# Patient Record
Sex: FEMALE | Race: WHITE | URBAN - METROPOLITAN AREA
[De-identification: names, ages, dates, MRNs, and addresses within clinical notes are randomized per-mention and may not be internally consistent; named-entity substitution may affect disease eponyms.]

---

## 2017-01-01 ENCOUNTER — MEDICAL CORRESPONDENCE (OUTPATIENT)
Dept: HEALTH INFORMATION MANAGEMENT | Facility: CLINIC | Age: 82
End: 2017-01-01

## 2017-01-01 ENCOUNTER — TELEPHONE (OUTPATIENT)
Dept: INTERNAL MEDICINE | Facility: CLINIC | Age: 82
End: 2017-01-01

## 2017-01-01 ENCOUNTER — APPOINTMENT (OUTPATIENT)
Dept: CARDIOLOGY | Facility: CLINIC | Age: 82
DRG: 603 | End: 2017-01-01
Attending: INTERNAL MEDICINE
Payer: MEDICARE

## 2017-01-01 ENCOUNTER — APPOINTMENT (OUTPATIENT)
Dept: ULTRASOUND IMAGING | Facility: CLINIC | Age: 82
DRG: 682 | End: 2017-01-01
Attending: INTERNAL MEDICINE
Payer: MEDICARE

## 2017-01-01 ENCOUNTER — CARE COORDINATION (OUTPATIENT)
Dept: CARE COORDINATION | Facility: CLINIC | Age: 82
End: 2017-01-01

## 2017-01-01 ENCOUNTER — OFFICE VISIT (OUTPATIENT)
Dept: INTERNAL MEDICINE | Facility: CLINIC | Age: 82
End: 2017-01-01
Payer: COMMERCIAL

## 2017-01-01 ENCOUNTER — NURSING HOME VISIT (OUTPATIENT)
Dept: GERIATRICS | Facility: CLINIC | Age: 82
End: 2017-01-01
Payer: COMMERCIAL

## 2017-01-01 ENCOUNTER — APPOINTMENT (OUTPATIENT)
Dept: PHYSICAL THERAPY | Facility: CLINIC | Age: 82
DRG: 603 | End: 2017-01-01
Payer: MEDICARE

## 2017-01-01 ENCOUNTER — APPOINTMENT (OUTPATIENT)
Dept: ULTRASOUND IMAGING | Facility: CLINIC | Age: 82
DRG: 603 | End: 2017-01-01
Attending: EMERGENCY MEDICINE
Payer: MEDICARE

## 2017-01-01 ENCOUNTER — HOSPITAL ENCOUNTER (EMERGENCY)
Facility: CLINIC | Age: 82
Discharge: HOME | End: 2017-04-30
Attending: EMERGENCY MEDICINE | Admitting: EMERGENCY MEDICINE
Payer: MEDICARE

## 2017-01-01 ENCOUNTER — APPOINTMENT (OUTPATIENT)
Dept: PHYSICAL THERAPY | Facility: CLINIC | Age: 82
DRG: 682 | End: 2017-01-01
Attending: INTERNAL MEDICINE
Payer: MEDICARE

## 2017-01-01 ENCOUNTER — TELEPHONE (OUTPATIENT)
Dept: NURSING | Facility: CLINIC | Age: 82
End: 2017-01-01

## 2017-01-01 ENCOUNTER — APPOINTMENT (OUTPATIENT)
Dept: PHYSICAL THERAPY | Facility: CLINIC | Age: 82
DRG: 603 | End: 2017-01-01
Attending: INTERNAL MEDICINE
Payer: MEDICARE

## 2017-01-01 ENCOUNTER — HOSPITAL ENCOUNTER (INPATIENT)
Facility: CLINIC | Age: 82
LOS: 4 days | Discharge: NURSING, REHAB OR LONG-TERM CARE | DRG: 682 | End: 2017-10-14
Attending: EMERGENCY MEDICINE | Admitting: INTERNAL MEDICINE
Payer: MEDICARE

## 2017-01-01 ENCOUNTER — DOCUMENTATION ONLY (OUTPATIENT)
Dept: OTHER | Facility: CLINIC | Age: 82
End: 2017-01-01

## 2017-01-01 ENCOUNTER — HOSPITAL ENCOUNTER (INPATIENT)
Facility: CLINIC | Age: 82
LOS: 3 days | Discharge: NURSING, REHAB OR LONG-TERM CARE | DRG: 603 | End: 2017-01-12
Attending: EMERGENCY MEDICINE | Admitting: INTERNAL MEDICINE
Payer: MEDICARE

## 2017-01-01 ENCOUNTER — RADIANT APPOINTMENT (OUTPATIENT)
Dept: GENERAL RADIOLOGY | Facility: CLINIC | Age: 82
End: 2017-01-01
Attending: INTERNAL MEDICINE
Payer: COMMERCIAL

## 2017-01-01 ENCOUNTER — APPOINTMENT (OUTPATIENT)
Dept: GENERAL RADIOLOGY | Facility: CLINIC | Age: 82
DRG: 603 | End: 2017-01-01
Attending: EMERGENCY MEDICINE
Payer: MEDICARE

## 2017-01-01 ENCOUNTER — APPOINTMENT (OUTPATIENT)
Dept: CT IMAGING | Facility: CLINIC | Age: 82
DRG: 682 | End: 2017-01-01
Attending: EMERGENCY MEDICINE
Payer: MEDICARE

## 2017-01-01 VITALS
TEMPERATURE: 97.5 F | DIASTOLIC BLOOD PRESSURE: 80 MMHG | OXYGEN SATURATION: 98 % | BODY MASS INDEX: 25.79 KG/M2 | SYSTOLIC BLOOD PRESSURE: 158 MMHG | WEIGHT: 141 LBS | HEART RATE: 49 BPM

## 2017-01-01 VITALS
SYSTOLIC BLOOD PRESSURE: 156 MMHG | DIASTOLIC BLOOD PRESSURE: 78 MMHG | HEART RATE: 55 BPM | WEIGHT: 159.9 LBS | BODY MASS INDEX: 26.61 KG/M2 | TEMPERATURE: 98 F | OXYGEN SATURATION: 98 %

## 2017-01-01 VITALS
DIASTOLIC BLOOD PRESSURE: 80 MMHG | WEIGHT: 120.7 LBS | HEART RATE: 60 BPM | BODY MASS INDEX: 23.7 KG/M2 | OXYGEN SATURATION: 98 % | TEMPERATURE: 98.7 F | HEIGHT: 60 IN | SYSTOLIC BLOOD PRESSURE: 158 MMHG

## 2017-01-01 VITALS
HEIGHT: 62 IN | TEMPERATURE: 98.6 F | BODY MASS INDEX: 28.89 KG/M2 | DIASTOLIC BLOOD PRESSURE: 78 MMHG | WEIGHT: 157 LBS | OXYGEN SATURATION: 96 % | SYSTOLIC BLOOD PRESSURE: 160 MMHG | HEART RATE: 59 BPM

## 2017-01-01 VITALS
HEIGHT: 59 IN | DIASTOLIC BLOOD PRESSURE: 105 MMHG | SYSTOLIC BLOOD PRESSURE: 227 MMHG | WEIGHT: 120 LBS | BODY MASS INDEX: 24.19 KG/M2 | TEMPERATURE: 98.1 F | HEART RATE: 85 BPM | OXYGEN SATURATION: 95 % | RESPIRATION RATE: 16 BRPM

## 2017-01-01 VITALS
HEIGHT: 59 IN | BODY MASS INDEX: 23.56 KG/M2 | DIASTOLIC BLOOD PRESSURE: 76 MMHG | HEART RATE: 60 BPM | WEIGHT: 116.84 LBS | OXYGEN SATURATION: 96 % | RESPIRATION RATE: 16 BRPM | TEMPERATURE: 97.8 F | SYSTOLIC BLOOD PRESSURE: 164 MMHG

## 2017-01-01 VITALS
HEART RATE: 67 BPM | SYSTOLIC BLOOD PRESSURE: 186 MMHG | OXYGEN SATURATION: 97 % | DIASTOLIC BLOOD PRESSURE: 82 MMHG | RESPIRATION RATE: 20 BRPM | TEMPERATURE: 98.7 F

## 2017-01-01 VITALS
TEMPERATURE: 98.2 F | DIASTOLIC BLOOD PRESSURE: 67 MMHG | SYSTOLIC BLOOD PRESSURE: 156 MMHG | BODY MASS INDEX: 24.9 KG/M2 | RESPIRATION RATE: 16 BRPM | HEART RATE: 68 BPM | HEIGHT: 65 IN | OXYGEN SATURATION: 98 % | WEIGHT: 149.47 LBS

## 2017-01-01 VITALS
WEIGHT: 149 LBS | DIASTOLIC BLOOD PRESSURE: 80 MMHG | SYSTOLIC BLOOD PRESSURE: 178 MMHG | BODY MASS INDEX: 24.79 KG/M2 | HEART RATE: 55 BPM | TEMPERATURE: 97.8 F | OXYGEN SATURATION: 98 %

## 2017-01-01 DIAGNOSIS — F41.9 ANXIETY: ICD-10-CM

## 2017-01-01 DIAGNOSIS — I10 ESSENTIAL HYPERTENSION, BENIGN: Primary | ICD-10-CM

## 2017-01-01 DIAGNOSIS — I27.20 PULMONARY HYPERTENSION (H): ICD-10-CM

## 2017-01-01 DIAGNOSIS — I27.20 PULMONARY HYPERTENSION (H): Primary | ICD-10-CM

## 2017-01-01 DIAGNOSIS — I10 ESSENTIAL HYPERTENSION, BENIGN: ICD-10-CM

## 2017-01-01 DIAGNOSIS — R21 RASH: ICD-10-CM

## 2017-01-01 DIAGNOSIS — I87.2 STASIS DERMATITIS OF BOTH LEGS: ICD-10-CM

## 2017-01-01 DIAGNOSIS — Z71.89 ACP (ADVANCE CARE PLANNING): Chronic | ICD-10-CM

## 2017-01-01 DIAGNOSIS — R60.0 BILATERAL LEG EDEMA: ICD-10-CM

## 2017-01-01 DIAGNOSIS — R06.02 SHORTNESS OF BREATH: ICD-10-CM

## 2017-01-01 DIAGNOSIS — N18.6 END STAGE RENAL DISEASE (H): Primary | ICD-10-CM

## 2017-01-01 DIAGNOSIS — N18.4 CKD (CHRONIC KIDNEY DISEASE) STAGE 4, GFR 15-29 ML/MIN (H): ICD-10-CM

## 2017-01-01 DIAGNOSIS — L03.115 CELLULITIS OF RIGHT LOWER EXTREMITY: ICD-10-CM

## 2017-01-01 DIAGNOSIS — I10 HYPERTENSION, UNSPECIFIED TYPE: ICD-10-CM

## 2017-01-01 DIAGNOSIS — I50.9 CONGESTIVE HEART FAILURE, UNSPECIFIED CONGESTIVE HEART FAILURE CHRONICITY, UNSPECIFIED CONGESTIVE HEART FAILURE TYPE: ICD-10-CM

## 2017-01-01 DIAGNOSIS — N18.9 CRF (CHRONIC RENAL FAILURE), UNSPECIFIED STAGE: ICD-10-CM

## 2017-01-01 DIAGNOSIS — R26.9 GAIT DISTURBANCE: ICD-10-CM

## 2017-01-01 DIAGNOSIS — N18.9 ACUTE RENAL FAILURE SUPERIMPOSED ON CHRONIC KIDNEY DISEASE, UNSPECIFIED CKD STAGE, UNSPECIFIED ACUTE RENAL FAILURE TYPE: ICD-10-CM

## 2017-01-01 DIAGNOSIS — I89.0 LYMPHEDEMA OF BOTH LOWER EXTREMITIES: Primary | ICD-10-CM

## 2017-01-01 DIAGNOSIS — R07.9 CHEST PAIN, UNSPECIFIED TYPE: ICD-10-CM

## 2017-01-01 DIAGNOSIS — R53.81 PHYSICAL DECONDITIONING: ICD-10-CM

## 2017-01-01 DIAGNOSIS — J90 PLEURAL EFFUSION: ICD-10-CM

## 2017-01-01 DIAGNOSIS — L03.115 CELLULITIS OF RIGHT LEG: Primary | ICD-10-CM

## 2017-01-01 DIAGNOSIS — Z53.9 DIAGNOSIS NOT YET DEFINED: Primary | ICD-10-CM

## 2017-01-01 DIAGNOSIS — N17.9 ACUTE RENAL FAILURE SUPERIMPOSED ON CHRONIC KIDNEY DISEASE, UNSPECIFIED CKD STAGE, UNSPECIFIED ACUTE RENAL FAILURE TYPE: ICD-10-CM

## 2017-01-01 DIAGNOSIS — J02.0 STREPTOCOCCAL PHARYNGITIS: ICD-10-CM

## 2017-01-01 DIAGNOSIS — L03.115 CELLULITIS OF RIGHT LEG: ICD-10-CM

## 2017-01-01 DIAGNOSIS — M54.6 THORACIC BACK PAIN, UNSPECIFIED BACK PAIN LATERALITY, UNSPECIFIED CHRONICITY: ICD-10-CM

## 2017-01-01 DIAGNOSIS — Z09 HOSPITAL DISCHARGE FOLLOW-UP: Primary | ICD-10-CM

## 2017-01-01 LAB
ALBUMIN UR-MCNC: >600 MG/DL
ALBUMIN UR-MCNC: >600 MG/DL
ANION GAP SERPL CALCULATED.3IONS-SCNC: 10 MMOL/L (ref 3–14)
ANION GAP SERPL CALCULATED.3IONS-SCNC: 12 MMOL/L (ref 3–14)
ANION GAP SERPL CALCULATED.3IONS-SCNC: 12 MMOL/L (ref 3–14)
ANION GAP SERPL CALCULATED.3IONS-SCNC: 7 MMOL/L (ref 3–14)
ANION GAP SERPL CALCULATED.3IONS-SCNC: 9 MMOL/L (ref 3–14)
APPEARANCE UR: CLEAR
APPEARANCE UR: CLEAR
BACTERIA #/AREA URNS HPF: ABNORMAL /HPF
BASOPHILS # BLD AUTO: 0 10E9/L (ref 0–0.2)
BASOPHILS NFR BLD AUTO: 0.2 %
BASOPHILS NFR BLD AUTO: 0.3 %
BASOPHILS NFR BLD AUTO: 0.3 %
BILIRUB UR QL STRIP: NEGATIVE
BILIRUB UR QL STRIP: NEGATIVE
BUN SERPL-MCNC: 34 MG/DL (ref 7–30)
BUN SERPL-MCNC: 36 MG/DL (ref 7–30)
BUN SERPL-MCNC: 36 MG/DL (ref 7–30)
BUN SERPL-MCNC: 37 MG/DL (ref 7–30)
BUN SERPL-MCNC: 38 MG/DL (ref 7–30)
BUN SERPL-MCNC: 39 MG/DL (ref 7–30)
BUN SERPL-MCNC: 41 MG/DL (ref 7–30)
BUN SERPL-MCNC: 50 MG/DL (ref 7–30)
BUN SERPL-MCNC: 53 MG/DL (ref 7–30)
BUN SERPL-MCNC: 54 MG/DL (ref 7–30)
BUN SERPL-MCNC: 54 MG/DL (ref 7–30)
BUN SERPL-MCNC: 57 MG/DL (ref 7–30)
CALCIUM SERPL-MCNC: 7.7 MG/DL (ref 8.5–10.1)
CALCIUM SERPL-MCNC: 7.8 MG/DL (ref 8.5–10.1)
CALCIUM SERPL-MCNC: 7.9 MG/DL (ref 8.5–10.1)
CALCIUM SERPL-MCNC: 8 MG/DL (ref 8.5–10.1)
CALCIUM SERPL-MCNC: 8 MG/DL (ref 8.5–10.1)
CALCIUM SERPL-MCNC: 8.1 MG/DL (ref 8.5–10.1)
CALCIUM SERPL-MCNC: 8.4 MG/DL (ref 8.5–10.1)
CALCIUM SERPL-MCNC: 8.5 MG/DL (ref 8.5–10.1)
CALCIUM SERPL-MCNC: 8.5 MG/DL (ref 8.5–10.1)
CALCIUM SERPL-MCNC: 8.6 MG/DL (ref 8.5–10.1)
CHLORIDE SERPL-SCNC: 103 MMOL/L (ref 94–109)
CHLORIDE SERPL-SCNC: 103 MMOL/L (ref 94–109)
CHLORIDE SERPL-SCNC: 104 MMOL/L (ref 94–109)
CHLORIDE SERPL-SCNC: 105 MMOL/L (ref 94–109)
CHLORIDE SERPL-SCNC: 106 MMOL/L (ref 94–109)
CHLORIDE SERPL-SCNC: 107 MMOL/L (ref 94–109)
CHLORIDE SERPL-SCNC: 109 MMOL/L (ref 94–109)
CHLORIDE SERPL-SCNC: 111 MMOL/L (ref 94–109)
CO2 SERPL-SCNC: 20 MMOL/L (ref 20–32)
CO2 SERPL-SCNC: 21 MMOL/L (ref 20–32)
CO2 SERPL-SCNC: 21 MMOL/L (ref 20–32)
CO2 SERPL-SCNC: 22 MMOL/L (ref 20–32)
CO2 SERPL-SCNC: 22 MMOL/L (ref 20–32)
CO2 SERPL-SCNC: 23 MMOL/L (ref 20–32)
CO2 SERPL-SCNC: 24 MMOL/L (ref 20–32)
CO2 SERPL-SCNC: 26 MMOL/L (ref 20–32)
CO2 SERPL-SCNC: 28 MMOL/L (ref 20–32)
COLOR UR AUTO: YELLOW
COLOR UR AUTO: YELLOW
CREAT SERPL-MCNC: 2.73 MG/DL (ref 0.52–1.04)
CREAT SERPL-MCNC: 2.76 MG/DL (ref 0.52–1.04)
CREAT SERPL-MCNC: 2.76 MG/DL (ref 0.52–1.04)
CREAT SERPL-MCNC: 2.84 MG/DL (ref 0.52–1.04)
CREAT SERPL-MCNC: 2.86 MG/DL (ref 0.52–1.04)
CREAT SERPL-MCNC: 2.9 MG/DL (ref 0.52–1.04)
CREAT SERPL-MCNC: 2.91 MG/DL (ref 0.52–1.04)
CREAT SERPL-MCNC: 3.03 MG/DL (ref 0.52–1.04)
CREAT SERPL-MCNC: 3.28 MG/DL (ref 0.52–1.04)
CREAT SERPL-MCNC: 4.84 MG/DL (ref 0.52–1.04)
CREAT SERPL-MCNC: 4.91 MG/DL (ref 0.52–1.04)
CREAT SERPL-MCNC: 4.96 MG/DL (ref 0.52–1.04)
CREAT SERPL-MCNC: 5.12 MG/DL (ref 0.52–1.04)
CREAT UR-MCNC: 108 MG/DL
CREAT UR-MCNC: 135 MG/DL
DEPRECATED S PYO AG THROAT QL EIA: ABNORMAL
DIFFERENTIAL METHOD BLD: ABNORMAL
EOSINOPHIL # BLD AUTO: 0 10E9/L (ref 0–0.7)
EOSINOPHIL # BLD AUTO: 0.1 10E9/L (ref 0–0.7)
EOSINOPHIL # BLD AUTO: 0.2 10E9/L (ref 0–0.7)
EOSINOPHIL NFR BLD AUTO: 0 %
EOSINOPHIL NFR BLD AUTO: 1.8 %
EOSINOPHIL NFR BLD AUTO: 1.8 %
ERYTHROCYTE [DISTWIDTH] IN BLOOD BY AUTOMATED COUNT: 13 % (ref 10–15)
ERYTHROCYTE [DISTWIDTH] IN BLOOD BY AUTOMATED COUNT: 13.5 % (ref 10–15)
ERYTHROCYTE [DISTWIDTH] IN BLOOD BY AUTOMATED COUNT: 13.8 % (ref 10–15)
ERYTHROCYTE [DISTWIDTH] IN BLOOD BY AUTOMATED COUNT: 13.9 % (ref 10–15)
ERYTHROCYTE [DISTWIDTH] IN BLOOD BY AUTOMATED COUNT: 14.1 % (ref 10–15)
ERYTHROCYTE [DISTWIDTH] IN BLOOD BY AUTOMATED COUNT: 14.3 % (ref 10–15)
ERYTHROCYTE [SEDIMENTATION RATE] IN BLOOD BY WESTERGREN METHOD: 101 MM/H (ref 0–30)
FERRITIN SERPL-MCNC: 279 NG/ML (ref 8–252)
FOLATE SERPL-MCNC: 14.1 NG/ML
FRACT EXCRET NA UR+SERPL-RTO: 0.5 %
FRACT EXCRET NA UR+SERPL-RTO: 1.1 %
GFR SERPL CREATININE-BSD FRML MDRD: 13 ML/MIN/1.7M2
GFR SERPL CREATININE-BSD FRML MDRD: 14 ML/MIN/1.7M2
GFR SERPL CREATININE-BSD FRML MDRD: 15 ML/MIN/1.7M2
GFR SERPL CREATININE-BSD FRML MDRD: 16 ML/MIN/1.7M2
GFR SERPL CREATININE-BSD FRML MDRD: 8 ML/MIN/1.7M2
GLUCOSE BLDC GLUCOMTR-MCNC: 100 MG/DL (ref 70–99)
GLUCOSE SERPL-MCNC: 103 MG/DL (ref 70–99)
GLUCOSE SERPL-MCNC: 113 MG/DL (ref 70–99)
GLUCOSE SERPL-MCNC: 114 MG/DL (ref 70–99)
GLUCOSE SERPL-MCNC: 115 MG/DL (ref 70–99)
GLUCOSE SERPL-MCNC: 124 MG/DL (ref 70–99)
GLUCOSE SERPL-MCNC: 132 MG/DL (ref 70–99)
GLUCOSE SERPL-MCNC: 148 MG/DL (ref 70–99)
GLUCOSE SERPL-MCNC: 176 MG/DL (ref 70–99)
GLUCOSE SERPL-MCNC: 86 MG/DL (ref 70–99)
GLUCOSE SERPL-MCNC: 93 MG/DL (ref 70–99)
GLUCOSE SERPL-MCNC: 95 MG/DL (ref 70–99)
GLUCOSE SERPL-MCNC: 96 MG/DL (ref 70–99)
GLUCOSE UR STRIP-MCNC: 150 MG/DL
GLUCOSE UR STRIP-MCNC: 70 MG/DL
HCT VFR BLD AUTO: 27 % (ref 35–47)
HCT VFR BLD AUTO: 29.4 % (ref 35–47)
HCT VFR BLD AUTO: 29.8 % (ref 35–47)
HCT VFR BLD AUTO: 30.7 % (ref 35–47)
HCT VFR BLD AUTO: 31.7 % (ref 35–47)
HCT VFR BLD AUTO: 34.7 % (ref 35–47)
HGB BLD-MCNC: 10.5 G/DL (ref 11.7–15.7)
HGB BLD-MCNC: 11.6 G/DL (ref 11.7–15.7)
HGB BLD-MCNC: 8.6 G/DL (ref 11.7–15.7)
HGB BLD-MCNC: 8.8 G/DL (ref 11.7–15.7)
HGB BLD-MCNC: 9.5 G/DL (ref 11.7–15.7)
HGB BLD-MCNC: 9.8 G/DL (ref 11.7–15.7)
HGB BLD-MCNC: 9.9 G/DL (ref 11.7–15.7)
HGB UR QL STRIP: ABNORMAL
HGB UR QL STRIP: NEGATIVE
IMM GRANULOCYTES # BLD: 0 10E9/L (ref 0–0.4)
IMM GRANULOCYTES # BLD: 0 10E9/L (ref 0–0.4)
IMM GRANULOCYTES # BLD: 0.1 10E9/L (ref 0–0.4)
IMM GRANULOCYTES NFR BLD: 0.1 %
IMM GRANULOCYTES NFR BLD: 0.4 %
IMM GRANULOCYTES NFR BLD: 0.4 %
INTERPRETATION ECG - MUSE: NORMAL
IRON SATN MFR SERPL: 26 % (ref 15–46)
IRON SERPL-MCNC: 51 UG/DL (ref 35–180)
KETONES UR STRIP-MCNC: NEGATIVE MG/DL
KETONES UR STRIP-MCNC: NEGATIVE MG/DL
LEUKOCYTE ESTERASE UR QL STRIP: NEGATIVE
LEUKOCYTE ESTERASE UR QL STRIP: NEGATIVE
LYMPHOCYTES # BLD AUTO: 0.7 10E9/L (ref 0.8–5.3)
LYMPHOCYTES # BLD AUTO: 1.1 10E9/L (ref 0.8–5.3)
LYMPHOCYTES # BLD AUTO: 1.3 10E9/L (ref 0.8–5.3)
LYMPHOCYTES NFR BLD AUTO: 10.9 %
LYMPHOCYTES NFR BLD AUTO: 19.6 %
LYMPHOCYTES NFR BLD AUTO: 4.9 %
MAGNESIUM SERPL-MCNC: 2.2 MG/DL (ref 1.6–2.3)
MAGNESIUM SERPL-MCNC: 2.4 MG/DL (ref 1.6–2.3)
MCH RBC QN AUTO: 29.1 PG (ref 26.5–33)
MCH RBC QN AUTO: 29.5 PG (ref 26.5–33)
MCH RBC QN AUTO: 29.7 PG (ref 26.5–33)
MCH RBC QN AUTO: 29.7 PG (ref 26.5–33)
MCH RBC QN AUTO: 29.8 PG (ref 26.5–33)
MCH RBC QN AUTO: 30.2 PG (ref 26.5–33)
MCHC RBC AUTO-ENTMCNC: 31.9 G/DL (ref 31.5–36.5)
MCHC RBC AUTO-ENTMCNC: 32.3 G/DL (ref 31.5–36.5)
MCHC RBC AUTO-ENTMCNC: 32.6 G/DL (ref 31.5–36.5)
MCHC RBC AUTO-ENTMCNC: 33.1 G/DL (ref 31.5–36.5)
MCHC RBC AUTO-ENTMCNC: 33.2 G/DL (ref 31.5–36.5)
MCHC RBC AUTO-ENTMCNC: 33.4 G/DL (ref 31.5–36.5)
MCV RBC AUTO: 89 FL (ref 78–100)
MCV RBC AUTO: 90 FL (ref 78–100)
MCV RBC AUTO: 92 FL (ref 78–100)
MCV RBC AUTO: 95 FL (ref 78–100)
MICRO REPORT STATUS: ABNORMAL
MONOCYTES # BLD AUTO: 0.5 10E9/L (ref 0–1.3)
MONOCYTES # BLD AUTO: 0.8 10E9/L (ref 0–1.3)
MONOCYTES # BLD AUTO: 1 10E9/L (ref 0–1.3)
MONOCYTES NFR BLD AUTO: 10.2 %
MONOCYTES NFR BLD AUTO: 5.4 %
MONOCYTES NFR BLD AUTO: 7 %
MUCOUS THREADS #/AREA URNS LPF: PRESENT /LPF
NEUTROPHILS # BLD AUTO: 12.8 10E9/L (ref 1.6–8.3)
NEUTROPHILS # BLD AUTO: 4.9 10E9/L (ref 1.6–8.3)
NEUTROPHILS # BLD AUTO: 7.6 10E9/L (ref 1.6–8.3)
NEUTROPHILS NFR BLD AUTO: 71.2 %
NEUTROPHILS NFR BLD AUTO: 76.5 %
NEUTROPHILS NFR BLD AUTO: 89 %
NITRATE UR QL: NEGATIVE
NITRATE UR QL: NEGATIVE
NRBC # BLD AUTO: 0 10*3/UL
NRBC BLD AUTO-RTO: 0 /100
NT-PROBNP SERPL-MCNC: 8137 PG/ML (ref 0–1800)
NT-PROBNP SERPL-MCNC: ABNORMAL PG/ML (ref 0–1800)
NT-PROBNP SERPL-MCNC: ABNORMAL PG/ML (ref 0–450)
PH UR STRIP: 6 PH (ref 5–7)
PH UR STRIP: 6.5 PH (ref 5–7)
PHOSPHATE SERPL-MCNC: 4.6 MG/DL (ref 2.5–4.5)
PLATELET # BLD AUTO: 214 10E9/L (ref 150–450)
PLATELET # BLD AUTO: 216 10E9/L (ref 150–450)
PLATELET # BLD AUTO: 220 10E9/L (ref 150–450)
PLATELET # BLD AUTO: 224 10E9/L (ref 150–450)
PLATELET # BLD AUTO: 254 10E9/L (ref 150–450)
PLATELET # BLD AUTO: 270 10E9/L (ref 150–450)
PLATELET # BLD AUTO: 274 10E9/L (ref 150–450)
PLATELET # BLD AUTO: 320 10E9/L (ref 150–450)
POTASSIUM SERPL-SCNC: 3.1 MMOL/L (ref 3.4–5.3)
POTASSIUM SERPL-SCNC: 3.3 MMOL/L (ref 3.4–5.3)
POTASSIUM SERPL-SCNC: 4.1 MMOL/L (ref 3.4–5.3)
POTASSIUM SERPL-SCNC: 4.2 MMOL/L (ref 3.4–5.3)
POTASSIUM SERPL-SCNC: 4.3 MMOL/L (ref 3.4–5.3)
POTASSIUM SERPL-SCNC: 4.3 MMOL/L (ref 3.4–5.3)
POTASSIUM SERPL-SCNC: 4.4 MMOL/L (ref 3.4–5.3)
POTASSIUM SERPL-SCNC: 4.5 MMOL/L (ref 3.4–5.3)
POTASSIUM SERPL-SCNC: 4.8 MMOL/L (ref 3.4–5.3)
POTASSIUM SERPL-SCNC: 5.1 MMOL/L (ref 3.4–5.3)
POTASSIUM SERPL-SCNC: 5.2 MMOL/L (ref 3.4–5.3)
POTASSIUM SERPL-SCNC: 5.4 MMOL/L (ref 3.4–5.3)
PTH-INTACT SERPL-MCNC: 215 PG/ML (ref 12–72)
RBC # BLD AUTO: 3.02 10E12/L (ref 3.8–5.2)
RBC # BLD AUTO: 3.2 10E12/L (ref 3.8–5.2)
RBC # BLD AUTO: 3.24 10E12/L (ref 3.8–5.2)
RBC # BLD AUTO: 3.36 10E12/L (ref 3.8–5.2)
RBC # BLD AUTO: 3.52 10E12/L (ref 3.8–5.2)
RBC # BLD AUTO: 3.9 10E12/L (ref 3.8–5.2)
RBC #/AREA URNS AUTO: 0 /HPF (ref 0–2)
RBC #/AREA URNS AUTO: <1 /HPF (ref 0–2)
SODIUM SERPL-SCNC: 136 MMOL/L (ref 133–144)
SODIUM SERPL-SCNC: 137 MMOL/L (ref 133–144)
SODIUM SERPL-SCNC: 137 MMOL/L (ref 133–144)
SODIUM SERPL-SCNC: 138 MMOL/L (ref 133–144)
SODIUM SERPL-SCNC: 139 MMOL/L (ref 133–144)
SODIUM SERPL-SCNC: 140 MMOL/L (ref 133–144)
SODIUM SERPL-SCNC: 141 MMOL/L (ref 133–144)
SODIUM SERPL-SCNC: 143 MMOL/L (ref 133–144)
SODIUM SERPL-SCNC: 144 MMOL/L (ref 133–144)
SODIUM UR-SCNC: 33 MMOL/L
SODIUM UR-SCNC: 35 MMOL/L
SOURCE: ABNORMAL
SP GR UR STRIP: 1.01 (ref 1–1.03)
SP GR UR STRIP: 1.02 (ref 1–1.03)
SPECIMEN SOURCE: ABNORMAL
SQUAMOUS #/AREA URNS AUTO: 1 /HPF (ref 0–1)
SQUAMOUS #/AREA URNS AUTO: <1 /HPF (ref 0–1)
TIBC SERPL-MCNC: 200 UG/DL (ref 240–430)
TROPONIN I SERPL-MCNC: 0.02 UG/L (ref 0–0.04)
TROPONIN I SERPL-MCNC: 0.03 UG/L (ref 0–0.04)
TROPONIN I SERPL-MCNC: NORMAL UG/L (ref 0–0.04)
URATE SERPL-MCNC: 7.8 MG/DL (ref 2.6–6)
URN SPEC COLLECT METH UR: ABNORMAL
UROBILINOGEN UR STRIP-MCNC: NORMAL MG/DL (ref 0–2)
UROBILINOGEN UR STRIP-MCNC: NORMAL MG/DL (ref 0–2)
VIT B12 SERPL-MCNC: 393 PG/ML (ref 193–986)
WBC # BLD AUTO: 10 10E9/L (ref 4–11)
WBC # BLD AUTO: 10.3 10E9/L (ref 4–11)
WBC # BLD AUTO: 14.4 10E9/L (ref 4–11)
WBC # BLD AUTO: 6.8 10E9/L (ref 4–11)
WBC # BLD AUTO: 7 10E9/L (ref 4–11)
WBC # BLD AUTO: 7 10E9/L (ref 4–11)
WBC #/AREA URNS AUTO: 4 /HPF (ref 0–2)
WBC #/AREA URNS AUTO: 5 /HPF (ref 0–2)

## 2017-01-01 PROCEDURE — 25000125 ZZHC RX 250: Performed by: INTERNAL MEDICINE

## 2017-01-01 PROCEDURE — 80048 BASIC METABOLIC PNL TOTAL CA: CPT | Performed by: EMERGENCY MEDICINE

## 2017-01-01 PROCEDURE — 93005 ELECTROCARDIOGRAM TRACING: CPT

## 2017-01-01 PROCEDURE — 25000132 ZZH RX MED GY IP 250 OP 250 PS 637: Mod: GY | Performed by: EMERGENCY MEDICINE

## 2017-01-01 PROCEDURE — 97116 GAIT TRAINING THERAPY: CPT | Mod: GP | Performed by: PHYSICAL THERAPIST

## 2017-01-01 PROCEDURE — 85049 AUTOMATED PLATELET COUNT: CPT | Performed by: INTERNAL MEDICINE

## 2017-01-01 PROCEDURE — A9270 NON-COVERED ITEM OR SERVICE: HCPCS | Mod: GY | Performed by: INTERNAL MEDICINE

## 2017-01-01 PROCEDURE — 85025 COMPLETE CBC W/AUTO DIFF WBC: CPT | Performed by: EMERGENCY MEDICINE

## 2017-01-01 PROCEDURE — 40000193 ZZH STATISTIC PT WARD VISIT

## 2017-01-01 PROCEDURE — A9270 NON-COVERED ITEM OR SERVICE: HCPCS | Mod: GY | Performed by: EMERGENCY MEDICINE

## 2017-01-01 PROCEDURE — 25000130 H RX MED GY IP 250 OP 259 PS 637: Mod: GY | Performed by: INTERNAL MEDICINE

## 2017-01-01 PROCEDURE — 25000132 ZZH RX MED GY IP 250 OP 250 PS 637: Mod: GY | Performed by: INTERNAL MEDICINE

## 2017-01-01 PROCEDURE — 99215 OFFICE O/P EST HI 40 MIN: CPT | Performed by: INTERNAL MEDICINE

## 2017-01-01 PROCEDURE — 85018 HEMOGLOBIN: CPT | Performed by: INTERNAL MEDICINE

## 2017-01-01 PROCEDURE — 99207 ZZC CDG-CORRECTLY CODED, REVIEWED AND AGREE: CPT | Performed by: FAMILY MEDICINE

## 2017-01-01 PROCEDURE — 40000894 ZZH STATISTIC OT IP EVAL DEFER

## 2017-01-01 PROCEDURE — 99214 OFFICE O/P EST MOD 30 MIN: CPT | Performed by: INTERNAL MEDICINE

## 2017-01-01 PROCEDURE — G0179 MD RECERTIFICATION HHA PT: HCPCS | Performed by: INTERNAL MEDICINE

## 2017-01-01 PROCEDURE — 12000000 ZZH R&B MED SURG/OB

## 2017-01-01 PROCEDURE — 99283 EMERGENCY DEPT VISIT LOW MDM: CPT

## 2017-01-01 PROCEDURE — 82570 ASSAY OF URINE CREATININE: CPT | Performed by: INTERNAL MEDICINE

## 2017-01-01 PROCEDURE — 21000001 ZZH R&B HEART CARE

## 2017-01-01 PROCEDURE — 80048 BASIC METABOLIC PNL TOTAL CA: CPT | Performed by: INTERNAL MEDICINE

## 2017-01-01 PROCEDURE — 93005 ELECTROCARDIOGRAM TRACING: CPT | Performed by: INTERNAL MEDICINE

## 2017-01-01 PROCEDURE — 00000146 ZZHCL STATISTIC GLUCOSE BY METER IP

## 2017-01-01 PROCEDURE — 97116 GAIT TRAINING THERAPY: CPT | Mod: GP

## 2017-01-01 PROCEDURE — 84484 ASSAY OF TROPONIN QUANT: CPT | Performed by: INTERNAL MEDICINE

## 2017-01-01 PROCEDURE — 82607 VITAMIN B-12: CPT | Performed by: INTERNAL MEDICINE

## 2017-01-01 PROCEDURE — 85027 COMPLETE CBC AUTOMATED: CPT | Performed by: INTERNAL MEDICINE

## 2017-01-01 PROCEDURE — 71020 XR CHEST 2 VW: CPT

## 2017-01-01 PROCEDURE — 96375 TX/PRO/DX INJ NEW DRUG ADDON: CPT

## 2017-01-01 PROCEDURE — 84484 ASSAY OF TROPONIN QUANT: CPT | Performed by: EMERGENCY MEDICINE

## 2017-01-01 PROCEDURE — 36415 COLL VENOUS BLD VENIPUNCTURE: CPT | Performed by: INTERNAL MEDICINE

## 2017-01-01 PROCEDURE — 93971 EXTREMITY STUDY: CPT | Mod: RT

## 2017-01-01 PROCEDURE — 99223 1ST HOSP IP/OBS HIGH 75: CPT | Performed by: INTERNAL MEDICINE

## 2017-01-01 PROCEDURE — 93306 TTE W/DOPPLER COMPLETE: CPT | Mod: 26 | Performed by: INTERNAL MEDICINE

## 2017-01-01 PROCEDURE — 82565 ASSAY OF CREATININE: CPT | Performed by: INTERNAL MEDICINE

## 2017-01-01 PROCEDURE — 83735 ASSAY OF MAGNESIUM: CPT | Performed by: INTERNAL MEDICINE

## 2017-01-01 PROCEDURE — 25000128 H RX IP 250 OP 636: Performed by: INTERNAL MEDICINE

## 2017-01-01 PROCEDURE — 40000193 ZZH STATISTIC PT WARD VISIT: Performed by: PHYSICAL THERAPIST

## 2017-01-01 PROCEDURE — 99232 SBSQ HOSP IP/OBS MODERATE 35: CPT | Performed by: INTERNAL MEDICINE

## 2017-01-01 PROCEDURE — 76770 US EXAM ABDO BACK WALL COMP: CPT

## 2017-01-01 PROCEDURE — 96365 THER/PROPH/DIAG IV INF INIT: CPT | Performed by: INTERNAL MEDICINE

## 2017-01-01 PROCEDURE — 97162 PT EVAL MOD COMPLEX 30 MIN: CPT | Mod: GP | Performed by: PHYSICAL THERAPIST

## 2017-01-01 PROCEDURE — 99223 1ST HOSP IP/OBS HIGH 75: CPT | Mod: AI | Performed by: INTERNAL MEDICINE

## 2017-01-01 PROCEDURE — 84300 ASSAY OF URINE SODIUM: CPT | Performed by: INTERNAL MEDICINE

## 2017-01-01 PROCEDURE — 97140 MANUAL THERAPY 1/> REGIONS: CPT | Mod: GP

## 2017-01-01 PROCEDURE — 85652 RBC SED RATE AUTOMATED: CPT | Performed by: EMERGENCY MEDICINE

## 2017-01-01 PROCEDURE — 99238 HOSP IP/OBS DSCHRG MGMT 30/<: CPT | Performed by: INTERNAL MEDICINE

## 2017-01-01 PROCEDURE — 99285 EMERGENCY DEPT VISIT HI MDM: CPT | Mod: 25 | Performed by: INTERNAL MEDICINE

## 2017-01-01 PROCEDURE — 93306 TTE W/DOPPLER COMPLETE: CPT

## 2017-01-01 PROCEDURE — 96375 TX/PRO/DX INJ NEW DRUG ADDON: CPT | Performed by: INTERNAL MEDICINE

## 2017-01-01 PROCEDURE — 25000125 ZZHC RX 250: Performed by: EMERGENCY MEDICINE

## 2017-01-01 PROCEDURE — 81001 URINALYSIS AUTO W/SCOPE: CPT | Performed by: INTERNAL MEDICINE

## 2017-01-01 PROCEDURE — 71250 CT THORAX DX C-: CPT

## 2017-01-01 PROCEDURE — 84550 ASSAY OF BLOOD/URIC ACID: CPT | Performed by: INTERNAL MEDICINE

## 2017-01-01 PROCEDURE — 84295 ASSAY OF SERUM SODIUM: CPT | Performed by: INTERNAL MEDICINE

## 2017-01-01 PROCEDURE — 82728 ASSAY OF FERRITIN: CPT | Performed by: INTERNAL MEDICINE

## 2017-01-01 PROCEDURE — 96374 THER/PROPH/DIAG INJ IV PUSH: CPT

## 2017-01-01 PROCEDURE — 83880 ASSAY OF NATRIURETIC PEPTIDE: CPT | Performed by: EMERGENCY MEDICINE

## 2017-01-01 PROCEDURE — 99233 SBSQ HOSP IP/OBS HIGH 50: CPT | Performed by: INTERNAL MEDICINE

## 2017-01-01 PROCEDURE — 83540 ASSAY OF IRON: CPT | Performed by: INTERNAL MEDICINE

## 2017-01-01 PROCEDURE — 83970 ASSAY OF PARATHORMONE: CPT | Performed by: INTERNAL MEDICINE

## 2017-01-01 PROCEDURE — 25000128 H RX IP 250 OP 636: Performed by: EMERGENCY MEDICINE

## 2017-01-01 PROCEDURE — 83550 IRON BINDING TEST: CPT | Performed by: INTERNAL MEDICINE

## 2017-01-01 PROCEDURE — 99223 1ST HOSP IP/OBS HIGH 75: CPT | Performed by: NURSE PRACTITIONER

## 2017-01-01 PROCEDURE — 87880 STREP A ASSAY W/OPTIC: CPT | Performed by: EMERGENCY MEDICINE

## 2017-01-01 PROCEDURE — 97161 PT EVAL LOW COMPLEX 20 MIN: CPT | Mod: GP | Performed by: PHYSICAL THERAPIST

## 2017-01-01 PROCEDURE — 82746 ASSAY OF FOLIC ACID SERUM: CPT | Performed by: INTERNAL MEDICINE

## 2017-01-01 PROCEDURE — 97110 THERAPEUTIC EXERCISES: CPT | Mod: GP | Performed by: PHYSICAL THERAPIST

## 2017-01-01 PROCEDURE — 84100 ASSAY OF PHOSPHORUS: CPT | Performed by: INTERNAL MEDICINE

## 2017-01-01 PROCEDURE — 97110 THERAPEUTIC EXERCISES: CPT | Mod: GP

## 2017-01-01 PROCEDURE — 99231 SBSQ HOSP IP/OBS SF/LOW 25: CPT | Performed by: INTERNAL MEDICINE

## 2017-01-01 PROCEDURE — 99495 TRANSJ CARE MGMT MOD F2F 14D: CPT | Performed by: INTERNAL MEDICINE

## 2017-01-01 PROCEDURE — 99306 1ST NF CARE HIGH MDM 50: CPT | Performed by: FAMILY MEDICINE

## 2017-01-01 PROCEDURE — 93010 ELECTROCARDIOGRAM REPORT: CPT | Performed by: INTERNAL MEDICINE

## 2017-01-01 PROCEDURE — 83880 ASSAY OF NATRIURETIC PEPTIDE: CPT | Performed by: INTERNAL MEDICINE

## 2017-01-01 PROCEDURE — 99239 HOSP IP/OBS DSCHRG MGMT >30: CPT | Performed by: INTERNAL MEDICINE

## 2017-01-01 PROCEDURE — 99213 OFFICE O/P EST LOW 20 MIN: CPT | Performed by: INTERNAL MEDICINE

## 2017-01-01 PROCEDURE — G0180 MD CERTIFICATION HHA PATIENT: HCPCS | Performed by: INTERNAL MEDICINE

## 2017-01-01 PROCEDURE — 97140 MANUAL THERAPY 1/> REGIONS: CPT | Mod: GP | Performed by: PHYSICAL THERAPIST

## 2017-01-01 PROCEDURE — 99207 ZZC CDG-CORRECTLY CODED, REVIEWED AND AGREE: CPT | Performed by: NURSE PRACTITIONER

## 2017-01-01 PROCEDURE — 99285 EMERGENCY DEPT VISIT HI MDM: CPT | Mod: 25

## 2017-01-01 RX ORDER — NALOXONE HYDROCHLORIDE 0.4 MG/ML
.1-.4 INJECTION, SOLUTION INTRAMUSCULAR; INTRAVENOUS; SUBCUTANEOUS
Status: DISCONTINUED | OUTPATIENT
Start: 2017-01-01 | End: 2017-01-01

## 2017-01-01 RX ORDER — AMOXICILLIN 500 MG/1
500 CAPSULE ORAL ONCE
Status: COMPLETED | OUTPATIENT
Start: 2017-01-01 | End: 2017-01-01

## 2017-01-01 RX ORDER — SODIUM CHLORIDE 9 MG/ML
INJECTION, SOLUTION INTRAVENOUS ONCE
Status: DISCONTINUED | OUTPATIENT
Start: 2017-01-01 | End: 2017-01-01

## 2017-01-01 RX ORDER — HYDRALAZINE HYDROCHLORIDE 50 MG/1
50 TABLET, FILM COATED ORAL 3 TIMES DAILY
Qty: 270 TABLET | Refills: 3 | Status: ON HOLD | OUTPATIENT
Start: 2017-01-01 | End: 2017-01-01

## 2017-01-01 RX ORDER — HYDROMORPHONE HYDROCHLORIDE 1 MG/ML
1-2 SOLUTION ORAL
Qty: 120 ML | Refills: 0 | Status: SHIPPED | OUTPATIENT
Start: 2017-01-01

## 2017-01-01 RX ORDER — AMLODIPINE BESYLATE 2.5 MG/1
2.5 TABLET ORAL DAILY
Qty: 30 TABLET | Refills: 1 | Status: SHIPPED | OUTPATIENT
Start: 2017-01-01 | End: 2017-01-01 | Stop reason: DRUGHIGH

## 2017-01-01 RX ORDER — AMLODIPINE BESYLATE 5 MG/1
5 TABLET ORAL DAILY
Qty: 90 TABLET | Refills: 1 | Status: SHIPPED | OUTPATIENT
Start: 2017-01-01 | End: 2017-01-01

## 2017-01-01 RX ORDER — LABETALOL HYDROCHLORIDE 5 MG/ML
10 INJECTION, SOLUTION INTRAVENOUS ONCE
Status: COMPLETED | OUTPATIENT
Start: 2017-01-01 | End: 2017-01-01

## 2017-01-01 RX ORDER — METOPROLOL TARTRATE 25 MG/1
25 TABLET, FILM COATED ORAL 2 TIMES DAILY
Qty: 540 TABLET | Refills: 0 | Status: SHIPPED | OUTPATIENT
Start: 2017-01-01

## 2017-01-01 RX ORDER — CITALOPRAM HYDROBROMIDE 10 MG/1
10 TABLET ORAL DAILY
Qty: 90 TABLET | Refills: 3 | Status: SHIPPED | OUTPATIENT
Start: 2017-01-01 | End: 2017-01-01

## 2017-01-01 RX ORDER — HALOPERIDOL 2 MG/ML
1-2 SOLUTION ORAL EVERY 6 HOURS PRN
Qty: 30 ML | Refills: 1 | Status: SHIPPED | OUTPATIENT
Start: 2017-01-01

## 2017-01-01 RX ORDER — NALOXONE HYDROCHLORIDE 0.4 MG/ML
.1-.4 INJECTION, SOLUTION INTRAMUSCULAR; INTRAVENOUS; SUBCUTANEOUS
Status: DISCONTINUED | OUTPATIENT
Start: 2017-01-01 | End: 2017-01-01 | Stop reason: HOSPADM

## 2017-01-01 RX ORDER — BISACODYL 10 MG
10 SUPPOSITORY, RECTAL RECTAL DAILY PRN
Status: DISCONTINUED | OUTPATIENT
Start: 2017-01-01 | End: 2017-01-01 | Stop reason: HOSPADM

## 2017-01-01 RX ORDER — HYDRALAZINE HYDROCHLORIDE 20 MG/ML
10 INJECTION INTRAMUSCULAR; INTRAVENOUS ONCE
Status: COMPLETED | OUTPATIENT
Start: 2017-01-01 | End: 2017-01-01

## 2017-01-01 RX ORDER — PROCHLORPERAZINE 25 MG
12.5 SUPPOSITORY, RECTAL RECTAL EVERY 12 HOURS PRN
Status: DISCONTINUED | OUTPATIENT
Start: 2017-01-01 | End: 2017-01-01

## 2017-01-01 RX ORDER — AMLODIPINE BESYLATE 10 MG/1
10 TABLET ORAL DAILY
Status: DISCONTINUED | OUTPATIENT
Start: 2017-01-01 | End: 2017-01-01 | Stop reason: HOSPADM

## 2017-01-01 RX ORDER — CEPHALEXIN 500 MG/1
500 CAPSULE ORAL EVERY 12 HOURS
Status: DISCONTINUED | OUTPATIENT
Start: 2017-01-01 | End: 2017-01-01 | Stop reason: HOSPADM

## 2017-01-01 RX ORDER — FUROSEMIDE 20 MG
20 TABLET ORAL DAILY
Qty: 60 TABLET | Refills: 5 | Status: SHIPPED | OUTPATIENT
Start: 2017-01-01 | End: 2017-01-01

## 2017-01-01 RX ORDER — FUROSEMIDE 40 MG
40 TABLET ORAL DAILY
Status: DISCONTINUED | OUTPATIENT
Start: 2017-01-01 | End: 2017-01-01 | Stop reason: HOSPADM

## 2017-01-01 RX ORDER — ONDANSETRON 2 MG/ML
4 INJECTION INTRAMUSCULAR; INTRAVENOUS EVERY 6 HOURS PRN
Status: DISCONTINUED | OUTPATIENT
Start: 2017-01-01 | End: 2017-01-01 | Stop reason: HOSPADM

## 2017-01-01 RX ORDER — LORAZEPAM 2 MG/ML
0.5 CONCENTRATE ORAL EVERY 4 HOURS PRN
Qty: 30 ML | Refills: 1 | Status: SHIPPED | OUTPATIENT
Start: 2017-01-01

## 2017-01-01 RX ORDER — DIPHENHYDRAMINE HCL 25 MG
25 CAPSULE ORAL EVERY 6 HOURS PRN
Status: DISCONTINUED | OUTPATIENT
Start: 2017-01-01 | End: 2017-01-01 | Stop reason: HOSPADM

## 2017-01-01 RX ORDER — HYDRALAZINE HYDROCHLORIDE 20 MG/ML
10 INJECTION INTRAMUSCULAR; INTRAVENOUS EVERY 4 HOURS PRN
Status: DISCONTINUED | OUTPATIENT
Start: 2017-01-01 | End: 2017-01-01 | Stop reason: HOSPADM

## 2017-01-01 RX ORDER — BISACODYL 10 MG
SUPPOSITORY, RECTAL RECTAL
Qty: 12 SUPPOSITORY | Refills: 1 | Status: SHIPPED | OUTPATIENT
Start: 2017-01-01

## 2017-01-01 RX ORDER — HYDRALAZINE HYDROCHLORIDE 20 MG/ML
10 INJECTION INTRAMUSCULAR; INTRAVENOUS EVERY 6 HOURS PRN
Status: DISCONTINUED | OUTPATIENT
Start: 2017-01-01 | End: 2017-01-01

## 2017-01-01 RX ORDER — POLYETHYLENE GLYCOL 3350 17 G/17G
17 POWDER, FOR SOLUTION ORAL DAILY PRN
Status: DISCONTINUED | OUTPATIENT
Start: 2017-01-01 | End: 2017-01-01

## 2017-01-01 RX ORDER — ALBUTEROL SULFATE 90 UG/1
2 AEROSOL, METERED RESPIRATORY (INHALATION) EVERY 6 HOURS PRN
Qty: 3 INHALER | Refills: 1 | Status: SHIPPED | OUTPATIENT
Start: 2017-01-01

## 2017-01-01 RX ORDER — METOPROLOL SUCCINATE 25 MG/1
75 TABLET, EXTENDED RELEASE ORAL 2 TIMES DAILY
Qty: 180 TABLET | Refills: 3 | Status: SHIPPED | OUTPATIENT
Start: 2017-01-01 | End: 2017-01-01

## 2017-01-01 RX ORDER — CEPHALEXIN 500 MG/1
500 CAPSULE ORAL EVERY 12 HOURS
Qty: 10 CAPSULE | Refills: 0 | Status: SHIPPED | OUTPATIENT
Start: 2017-01-01 | End: 2017-01-01

## 2017-01-01 RX ORDER — ACETAMINOPHEN 325 MG/1
650 TABLET ORAL EVERY 4 HOURS PRN
Status: DISCONTINUED | OUTPATIENT
Start: 2017-01-01 | End: 2017-01-01 | Stop reason: HOSPADM

## 2017-01-01 RX ORDER — MORPHINE SULFATE 2 MG/ML
2 INJECTION, SOLUTION INTRAMUSCULAR; INTRAVENOUS ONCE
Status: COMPLETED | OUTPATIENT
Start: 2017-01-01 | End: 2017-01-01

## 2017-01-01 RX ORDER — HYDRALAZINE HYDROCHLORIDE 50 MG/1
50 TABLET, FILM COATED ORAL 3 TIMES DAILY
Status: DISCONTINUED | OUTPATIENT
Start: 2017-01-01 | End: 2017-01-01 | Stop reason: HOSPADM

## 2017-01-01 RX ORDER — METOPROLOL SUCCINATE 50 MG/1
50 TABLET, EXTENDED RELEASE ORAL 2 TIMES DAILY
Status: DISCONTINUED | OUTPATIENT
Start: 2017-01-01 | End: 2017-01-01

## 2017-01-01 RX ORDER — CEFAZOLIN SODIUM 1 G/3ML
1 INJECTION, POWDER, FOR SOLUTION INTRAMUSCULAR; INTRAVENOUS ONCE
Status: COMPLETED | OUTPATIENT
Start: 2017-01-01 | End: 2017-01-01

## 2017-01-01 RX ORDER — LABETALOL HYDROCHLORIDE 5 MG/ML
10 INJECTION, SOLUTION INTRAVENOUS
Status: DISCONTINUED | OUTPATIENT
Start: 2017-01-01 | End: 2017-01-01

## 2017-01-01 RX ORDER — METOPROLOL SUCCINATE 25 MG/1
75 TABLET, EXTENDED RELEASE ORAL 2 TIMES DAILY
Qty: 180 TABLET | Refills: 1 | Status: SHIPPED | OUTPATIENT
Start: 2017-01-01 | End: 2017-01-01

## 2017-01-01 RX ORDER — METOPROLOL TARTRATE 25 MG/1
25 TABLET, FILM COATED ORAL 2 TIMES DAILY
Status: DISCONTINUED | OUTPATIENT
Start: 2017-01-01 | End: 2017-01-01 | Stop reason: HOSPADM

## 2017-01-01 RX ORDER — AMOXICILLIN 250 MG
1-2 CAPSULE ORAL 2 TIMES DAILY PRN
Status: DISCONTINUED | OUTPATIENT
Start: 2017-01-01 | End: 2017-01-01 | Stop reason: HOSPADM

## 2017-01-01 RX ORDER — AMLODIPINE BESYLATE 5 MG/1
5 TABLET ORAL 2 TIMES DAILY
Qty: 90 TABLET | Refills: 1
Start: 2017-01-01 | End: 2017-01-01

## 2017-01-01 RX ORDER — AMLODIPINE BESYLATE 5 MG/1
TABLET ORAL
Qty: 90 TABLET | Refills: 1 | Status: SHIPPED | OUTPATIENT
Start: 2017-01-01 | End: 2017-01-01

## 2017-01-01 RX ORDER — ALBUTEROL SULFATE 0.83 MG/ML
2.5 SOLUTION RESPIRATORY (INHALATION) EVERY 6 HOURS PRN
Status: DISCONTINUED | OUTPATIENT
Start: 2017-01-01 | End: 2017-01-01 | Stop reason: HOSPADM

## 2017-01-01 RX ORDER — HYDROMORPHONE HYDROCHLORIDE 1 MG/ML
1-2 SOLUTION ORAL
Status: DISCONTINUED | OUTPATIENT
Start: 2017-01-01 | End: 2017-01-01 | Stop reason: HOSPADM

## 2017-01-01 RX ORDER — ATENOLOL 50 MG/1
50 TABLET ORAL DAILY
Qty: 90 TABLET | Refills: 3 | Status: CANCELLED | OUTPATIENT
Start: 2017-01-01

## 2017-01-01 RX ORDER — IBUPROFEN 600 MG/1
600 TABLET, FILM COATED ORAL ONCE
Status: COMPLETED | OUTPATIENT
Start: 2017-01-01 | End: 2017-01-01

## 2017-01-01 RX ORDER — HYDRALAZINE HYDROCHLORIDE 25 MG/1
100 TABLET, FILM COATED ORAL EVERY 8 HOURS SCHEDULED
Status: DISCONTINUED | OUTPATIENT
Start: 2017-01-01 | End: 2017-01-01 | Stop reason: HOSPADM

## 2017-01-01 RX ORDER — HYDRALAZINE HYDROCHLORIDE 20 MG/ML
10 INJECTION INTRAMUSCULAR; INTRAVENOUS ONCE
Status: DISCONTINUED | OUTPATIENT
Start: 2017-01-01 | End: 2017-01-01

## 2017-01-01 RX ORDER — HYDRALAZINE HYDROCHLORIDE 50 MG/1
50 TABLET, FILM COATED ORAL 3 TIMES DAILY
Qty: 60 TABLET | Refills: 1 | Status: SHIPPED | OUTPATIENT
Start: 2017-01-01 | End: 2017-01-01 | Stop reason: DRUGHIGH

## 2017-01-01 RX ORDER — LIDOCAINE 40 MG/G
CREAM TOPICAL
Status: DISCONTINUED | OUTPATIENT
Start: 2017-01-01 | End: 2017-01-01 | Stop reason: HOSPADM

## 2017-01-01 RX ORDER — METOPROLOL TARTRATE 25 MG/1
75 TABLET, FILM COATED ORAL 2 TIMES DAILY
Qty: 180 TABLET | Refills: 3 | Status: SHIPPED | OUTPATIENT
Start: 2017-01-01 | End: 2017-01-01

## 2017-01-01 RX ORDER — AMLODIPINE BESYLATE 5 MG/1
10 TABLET ORAL DAILY
Qty: 90 TABLET | Refills: 0 | Status: SHIPPED | OUTPATIENT
Start: 2017-01-01

## 2017-01-01 RX ORDER — HYDRALAZINE HYDROCHLORIDE 25 MG/1
25 TABLET, FILM COATED ORAL 3 TIMES DAILY
Status: DISCONTINUED | OUTPATIENT
Start: 2017-01-01 | End: 2017-01-01

## 2017-01-01 RX ORDER — PROCHLORPERAZINE MALEATE 5 MG
5 TABLET ORAL EVERY 6 HOURS PRN
Status: DISCONTINUED | OUTPATIENT
Start: 2017-01-01 | End: 2017-01-01

## 2017-01-01 RX ORDER — ONDANSETRON 4 MG/1
4 TABLET, ORALLY DISINTEGRATING ORAL EVERY 6 HOURS PRN
Status: DISCONTINUED | OUTPATIENT
Start: 2017-01-01 | End: 2017-01-01 | Stop reason: HOSPADM

## 2017-01-01 RX ORDER — NITROGLYCERIN 4 MG/G
1 OINTMENT RECTAL EVERY 12 HOURS
Status: CANCELLED | OUTPATIENT
Start: 2017-01-01

## 2017-01-01 RX ORDER — CEFAZOLIN SODIUM 1 G/3ML
1 INJECTION, POWDER, FOR SOLUTION INTRAMUSCULAR; INTRAVENOUS EVERY 12 HOURS
Status: DISCONTINUED | OUTPATIENT
Start: 2017-01-01 | End: 2017-01-01

## 2017-01-01 RX ORDER — AMLODIPINE BESYLATE 5 MG/1
5 TABLET ORAL DAILY
Status: DISCONTINUED | OUTPATIENT
Start: 2017-01-01 | End: 2017-01-01 | Stop reason: DRUGHIGH

## 2017-01-01 RX ORDER — ATROPINE SULFATE 10 MG/ML
2-4 SOLUTION/ DROPS OPHTHALMIC
Qty: 5 ML | Refills: 1 | Status: SHIPPED | OUTPATIENT
Start: 2017-01-01

## 2017-01-01 RX ORDER — ATENOLOL 50 MG/1
50 TABLET ORAL DAILY
Status: DISCONTINUED | OUTPATIENT
Start: 2017-01-01 | End: 2017-01-01

## 2017-01-01 RX ORDER — FUROSEMIDE 20 MG
20 TABLET ORAL 2 TIMES DAILY
Qty: 14 TABLET | Refills: 0 | Status: SHIPPED | OUTPATIENT
Start: 2017-01-01 | End: 2017-01-01

## 2017-01-01 RX ORDER — LORAZEPAM 2 MG/ML
.5-1 INJECTION INTRAMUSCULAR
Status: DISCONTINUED | OUTPATIENT
Start: 2017-01-01 | End: 2017-01-01

## 2017-01-01 RX ORDER — HALOPERIDOL 2 MG/ML
1-2 SOLUTION ORAL EVERY 6 HOURS PRN
Status: DISCONTINUED | OUTPATIENT
Start: 2017-01-01 | End: 2017-01-01 | Stop reason: HOSPADM

## 2017-01-01 RX ORDER — ATROPINE SULFATE 10 MG/ML
1-2 SOLUTION/ DROPS OPHTHALMIC
Status: DISCONTINUED | OUTPATIENT
Start: 2017-01-01 | End: 2017-01-01 | Stop reason: HOSPADM

## 2017-01-01 RX ORDER — AMLODIPINE BESYLATE 5 MG/1
5 TABLET ORAL ONCE
Status: COMPLETED | OUTPATIENT
Start: 2017-01-01 | End: 2017-01-01

## 2017-01-01 RX ORDER — SODIUM CHLORIDE 9 MG/ML
INJECTION, SOLUTION INTRAVENOUS CONTINUOUS
Status: DISCONTINUED | OUTPATIENT
Start: 2017-01-01 | End: 2017-01-01

## 2017-01-01 RX ORDER — LORAZEPAM 0.5 MG/1
.5-1 TABLET ORAL
Status: DISCONTINUED | OUTPATIENT
Start: 2017-01-01 | End: 2017-01-01

## 2017-01-01 RX ORDER — HYDRALAZINE HYDROCHLORIDE 50 MG/1
100 TABLET, FILM COATED ORAL 3 TIMES DAILY
Qty: 270 TABLET | Refills: 3 | Status: SHIPPED | OUTPATIENT
Start: 2017-01-01

## 2017-01-01 RX ORDER — AMOXICILLIN 500 MG/1
500 CAPSULE ORAL DAILY
Qty: 10 CAPSULE | Refills: 0 | Status: SHIPPED | OUTPATIENT
Start: 2017-01-01 | End: 2017-01-01

## 2017-01-01 RX ORDER — AMLODIPINE BESYLATE 5 MG/1
TABLET ORAL
Qty: 90 TABLET | Refills: 0 | Status: ON HOLD | OUTPATIENT
Start: 2017-01-01 | End: 2017-01-01

## 2017-01-01 RX ORDER — SENNOSIDES 8.6 MG
1-2 TABLET ORAL 2 TIMES DAILY
Qty: 100 TABLET | Refills: 1 | Status: SHIPPED | OUTPATIENT
Start: 2017-01-01

## 2017-01-01 RX ORDER — ACETAMINOPHEN 650 MG/1
650 SUPPOSITORY RECTAL EVERY 4 HOURS PRN
Status: DISCONTINUED | OUTPATIENT
Start: 2017-01-01 | End: 2017-01-01 | Stop reason: HOSPADM

## 2017-01-01 RX ORDER — HEPARIN SODIUM 5000 [USP'U]/.5ML
5000 INJECTION, SOLUTION INTRAVENOUS; SUBCUTANEOUS EVERY 12 HOURS
Status: DISCONTINUED | OUTPATIENT
Start: 2017-01-01 | End: 2017-01-01 | Stop reason: HOSPADM

## 2017-01-01 RX ORDER — ONDANSETRON 2 MG/ML
4 INJECTION INTRAMUSCULAR; INTRAVENOUS EVERY 6 HOURS PRN
Status: DISCONTINUED | OUTPATIENT
Start: 2017-01-01 | End: 2017-01-01

## 2017-01-01 RX ORDER — METOPROLOL TARTRATE 25 MG/1
TABLET, FILM COATED ORAL
Qty: 540 TABLET | Refills: 0 | Status: ON HOLD | OUTPATIENT
Start: 2017-01-01 | End: 2017-01-01

## 2017-01-01 RX ORDER — AMLODIPINE BESYLATE 10 MG/1
10 TABLET ORAL DAILY
Status: DISCONTINUED | OUTPATIENT
Start: 2017-01-01 | End: 2017-01-01

## 2017-01-01 RX ORDER — FUROSEMIDE 20 MG
TABLET ORAL
Qty: 90 TABLET | Refills: 5 | Status: SHIPPED | OUTPATIENT
Start: 2017-01-01

## 2017-01-01 RX ORDER — FUROSEMIDE 20 MG
20 TABLET ORAL DAILY
Qty: 5 TABLET | Refills: 0 | Status: SHIPPED | OUTPATIENT
Start: 2017-01-01 | End: 2017-01-01

## 2017-01-01 RX ORDER — LORAZEPAM 2 MG/ML
.5-1 CONCENTRATE ORAL EVERY 4 HOURS PRN
Status: DISCONTINUED | OUTPATIENT
Start: 2017-01-01 | End: 2017-01-01 | Stop reason: HOSPADM

## 2017-01-01 RX ADMIN — AMLODIPINE BESYLATE 10 MG: 10 TABLET ORAL at 07:58

## 2017-01-01 RX ADMIN — HEPARIN SODIUM 5000 UNITS: 10000 INJECTION, SOLUTION INTRAVENOUS; SUBCUTANEOUS at 06:28

## 2017-01-01 RX ADMIN — HYDRALAZINE HYDROCHLORIDE 10 MG: 20 INJECTION INTRAMUSCULAR; INTRAVENOUS at 01:11

## 2017-01-01 RX ADMIN — HYDRALAZINE HYDROCHLORIDE 10 MG: 20 INJECTION INTRAMUSCULAR; INTRAVENOUS at 00:50

## 2017-01-01 RX ADMIN — HEPARIN SODIUM 5000 UNITS: 10000 INJECTION, SOLUTION INTRAVENOUS; SUBCUTANEOUS at 06:07

## 2017-01-01 RX ADMIN — HYDRALAZINE HYDROCHLORIDE 100 MG: 50 TABLET ORAL at 14:59

## 2017-01-01 RX ADMIN — Medication 2.5 MG: at 10:02

## 2017-01-01 RX ADMIN — HYDRALAZINE HYDROCHLORIDE 100 MG: 50 TABLET ORAL at 22:54

## 2017-01-01 RX ADMIN — HYDRALAZINE HYDROCHLORIDE 100 MG: 50 TABLET ORAL at 13:39

## 2017-01-01 RX ADMIN — METOPROLOL TARTRATE 25 MG: 25 TABLET ORAL at 22:54

## 2017-01-01 RX ADMIN — HYDRALAZINE HYDROCHLORIDE 100 MG: 50 TABLET ORAL at 05:42

## 2017-01-01 RX ADMIN — HYDRALAZINE HYDROCHLORIDE 10 MG: 20 INJECTION INTRAMUSCULAR; INTRAVENOUS at 20:50

## 2017-01-01 RX ADMIN — SODIUM CHLORIDE 500 ML: 9 INJECTION, SOLUTION INTRAVENOUS at 22:07

## 2017-01-01 RX ADMIN — METOPROLOL TARTRATE 25 MG: 25 TABLET ORAL at 08:26

## 2017-01-01 RX ADMIN — Medication 2.5 MG: at 08:00

## 2017-01-01 RX ADMIN — METOPROLOL TARTRATE 25 MG: 25 TABLET ORAL at 21:34

## 2017-01-01 RX ADMIN — METOPROLOL SUCCINATE 50 MG: 50 TABLET, EXTENDED RELEASE ORAL at 21:28

## 2017-01-01 RX ADMIN — METOPROLOL SUCCINATE 50 MG: 50 TABLET, EXTENDED RELEASE ORAL at 08:02

## 2017-01-01 RX ADMIN — HYDRALAZINE HYDROCHLORIDE 100 MG: 50 TABLET ORAL at 21:34

## 2017-01-01 RX ADMIN — HYDRALAZINE HYDROCHLORIDE 25 MG: 25 TABLET ORAL at 08:22

## 2017-01-01 RX ADMIN — LABETALOL HYDROCHLORIDE 10 MG: 5 INJECTION, SOLUTION INTRAVENOUS at 13:55

## 2017-01-01 RX ADMIN — DIPHENHYDRAMINE HYDROCHLORIDE 25 MG: 25 CAPSULE ORAL at 22:05

## 2017-01-01 RX ADMIN — POLYETHYLENE GLYCOL 3350 17 G: 17 POWDER, FOR SOLUTION ORAL at 10:02

## 2017-01-01 RX ADMIN — IBUPROFEN 600 MG: 600 TABLET ORAL at 18:23

## 2017-01-01 RX ADMIN — METOPROLOL TARTRATE 25 MG: 25 TABLET ORAL at 07:58

## 2017-01-01 RX ADMIN — CEFAZOLIN SODIUM 1 G: 1 INJECTION, POWDER, FOR SOLUTION INTRAMUSCULAR; INTRAVENOUS at 12:54

## 2017-01-01 RX ADMIN — PROCHLORPERAZINE EDISYLATE 5 MG: 5 INJECTION INTRAMUSCULAR; INTRAVENOUS at 00:59

## 2017-01-01 RX ADMIN — HYDRALAZINE HYDROCHLORIDE 50 MG: 50 TABLET ORAL at 08:02

## 2017-01-01 RX ADMIN — HEPARIN SODIUM 5000 UNITS: 10000 INJECTION, SOLUTION INTRAVENOUS; SUBCUTANEOUS at 20:35

## 2017-01-01 RX ADMIN — AMLODIPINE BESYLATE 10 MG: 10 TABLET ORAL at 08:26

## 2017-01-01 RX ADMIN — METOPROLOL SUCCINATE 50 MG: 50 TABLET, EXTENDED RELEASE ORAL at 08:22

## 2017-01-01 RX ADMIN — CEFAZOLIN SODIUM 1 G: 1 INJECTION, POWDER, FOR SOLUTION INTRAMUSCULAR; INTRAVENOUS at 01:38

## 2017-01-01 RX ADMIN — HYDRALAZINE HYDROCHLORIDE 100 MG: 50 TABLET ORAL at 05:53

## 2017-01-01 RX ADMIN — ACETAMINOPHEN 650 MG: 325 TABLET, FILM COATED ORAL at 18:33

## 2017-01-01 RX ADMIN — FUROSEMIDE 40 MG: 40 TABLET ORAL at 16:50

## 2017-01-01 RX ADMIN — ONDANSETRON 4 MG: 4 TABLET, ORALLY DISINTEGRATING ORAL at 00:04

## 2017-01-01 RX ADMIN — HYDRALAZINE HYDROCHLORIDE 100 MG: 50 TABLET ORAL at 22:19

## 2017-01-01 RX ADMIN — ACETAMINOPHEN 325 MG: 325 TABLET, FILM COATED ORAL at 07:59

## 2017-01-01 RX ADMIN — HYDRALAZINE HYDROCHLORIDE 25 MG: 25 TABLET ORAL at 16:29

## 2017-01-01 RX ADMIN — HYDRALAZINE HYDROCHLORIDE 100 MG: 50 TABLET ORAL at 05:33

## 2017-01-01 RX ADMIN — HEPARIN SODIUM 5000 UNITS: 10000 INJECTION, SOLUTION INTRAVENOUS; SUBCUTANEOUS at 19:48

## 2017-01-01 RX ADMIN — DIPHENHYDRAMINE HYDROCHLORIDE 25 MG: 25 CAPSULE ORAL at 14:39

## 2017-01-01 RX ADMIN — CEFAZOLIN SODIUM 1 G: 1 INJECTION, POWDER, FOR SOLUTION INTRAMUSCULAR; INTRAVENOUS at 12:41

## 2017-01-01 RX ADMIN — AMOXICILLIN 500 MG: 500 CAPSULE ORAL at 20:14

## 2017-01-01 RX ADMIN — HYDRALAZINE HYDROCHLORIDE 10 MG: 20 INJECTION INTRAMUSCULAR; INTRAVENOUS at 11:17

## 2017-01-01 RX ADMIN — HEPARIN SODIUM 5000 UNITS: 10000 INJECTION, SOLUTION INTRAVENOUS; SUBCUTANEOUS at 18:24

## 2017-01-01 RX ADMIN — METOPROLOL TARTRATE 25 MG: 25 TABLET ORAL at 10:02

## 2017-01-01 RX ADMIN — CEFAZOLIN SODIUM 1 G: 1 INJECTION, POWDER, FOR SOLUTION INTRAMUSCULAR; INTRAVENOUS at 00:42

## 2017-01-01 RX ADMIN — HYDRALAZINE HYDROCHLORIDE 25 MG: 25 TABLET ORAL at 21:15

## 2017-01-01 RX ADMIN — HYDRALAZINE HYDROCHLORIDE 10 MG: 20 INJECTION INTRAMUSCULAR; INTRAVENOUS at 20:35

## 2017-01-01 RX ADMIN — AMLODIPINE BESYLATE 10 MG: 10 TABLET ORAL at 10:02

## 2017-01-01 RX ADMIN — DIPHENHYDRAMINE HYDROCHLORIDE 25 MG: 25 CAPSULE ORAL at 10:15

## 2017-01-01 RX ADMIN — AMLODIPINE BESYLATE 5 MG: 5 TABLET ORAL at 22:06

## 2017-01-01 RX ADMIN — AMLODIPINE BESYLATE 10 MG: 10 TABLET ORAL at 09:15

## 2017-01-01 RX ADMIN — HEPARIN SODIUM 5000 UNITS: 10000 INJECTION, SOLUTION INTRAVENOUS; SUBCUTANEOUS at 05:43

## 2017-01-01 RX ADMIN — CEFAZOLIN SODIUM 1 G: 1 INJECTION, POWDER, FOR SOLUTION INTRAMUSCULAR; INTRAVENOUS at 13:13

## 2017-01-01 RX ADMIN — FUROSEMIDE 40 MG: 40 TABLET ORAL at 10:02

## 2017-01-01 RX ADMIN — HYDRALAZINE HYDROCHLORIDE 50 MG: 50 TABLET ORAL at 21:28

## 2017-01-01 RX ADMIN — METOPROLOL TARTRATE 25 MG: 25 TABLET ORAL at 20:14

## 2017-01-01 RX ADMIN — Medication 2.5 MG: at 18:33

## 2017-01-01 RX ADMIN — MORPHINE SULFATE 2 MG: 2 INJECTION, SOLUTION INTRAMUSCULAR; INTRAVENOUS at 18:35

## 2017-01-01 RX ADMIN — SODIUM CHLORIDE 1000 ML: 9 INJECTION, SOLUTION INTRAVENOUS at 14:56

## 2017-01-01 RX ADMIN — HYDRALAZINE HYDROCHLORIDE 100 MG: 50 TABLET ORAL at 22:06

## 2017-01-01 RX ADMIN — ONDANSETRON 4 MG: 4 TABLET, ORALLY DISINTEGRATING ORAL at 17:17

## 2017-01-01 RX ADMIN — CEFAZOLIN SODIUM 1 G: 1 INJECTION, POWDER, FOR SOLUTION INTRAMUSCULAR; INTRAVENOUS at 13:11

## 2017-01-01 RX ADMIN — ATENOLOL 50 MG: 50 TABLET ORAL at 09:15

## 2017-01-01 RX ADMIN — LABETALOL HYDROCHLORIDE 10 MG: 5 INJECTION, SOLUTION INTRAVENOUS at 18:37

## 2017-01-01 RX ADMIN — SODIUM CHLORIDE: 9 INJECTION, SOLUTION INTRAVENOUS at 14:30

## 2017-01-01 RX ADMIN — SODIUM CHLORIDE: 9 INJECTION, SOLUTION INTRAVENOUS at 11:23

## 2017-01-01 RX ADMIN — ACETAMINOPHEN 650 MG: 325 TABLET, FILM COATED ORAL at 22:06

## 2017-01-01 RX ADMIN — Medication 2.5 MG: at 22:06

## 2017-01-01 RX ADMIN — HYDRALAZINE HYDROCHLORIDE 50 MG: 50 TABLET ORAL at 16:14

## 2017-01-01 ASSESSMENT — ENCOUNTER SYMPTOMS
NAUSEA: 0
FREQUENCY: 0
VOMITING: 0
TROUBLE SWALLOWING: 1
TREMORS: 1
DIARRHEA: 0
DYSURIA: 0
FEVER: 0
HEMATURIA: 0
NECK PAIN: 1
FEVER: 0
COLOR CHANGE: 1
SORE THROAT: 1
HEADACHES: 0
COUGH: 0
VOMITING: 0
ABDOMINAL PAIN: 0
BLOOD IN STOOL: 0
FEVER: 0
DIARRHEA: 0
VOMITING: 0
BACK PAIN: 1
ABDOMINAL PAIN: 0
SHORTNESS OF BREATH: 1
NAUSEA: 0
WEAKNESS: 0
NUMBNESS: 0
APPETITE CHANGE: 1
SPEECH DIFFICULTY: 0
COUGH: 0

## 2017-01-01 ASSESSMENT — ACTIVITIES OF DAILY LIVING (ADL)
FALL_HISTORY_WITHIN_LAST_SIX_MONTHS: NO
TOILETING: 0-->INDEPENDENT
TRANSFERRING: 0-->INDEPENDENT
RETIRED_EATING: 0-->INDEPENDENT
RETIRED_COMMUNICATION: 0-->UNDERSTANDS/COMMUNICATES WITHOUT DIFFICULTY
COGNITION: 0 - NO COGNITION ISSUES REPORTED
AMBULATION: 0-->INDEPENDENT
DRESS: 0-->INDEPENDENT
SWALLOWING: 0-->SWALLOWS FOODS/LIQUIDS WITHOUT DIFFICULTY
BATHING: 0-->INDEPENDENT

## 2017-01-01 ASSESSMENT — PAIN DESCRIPTION - DESCRIPTORS
DESCRIPTORS: SHARP
DESCRIPTORS: ACHING;DISCOMFORT
DESCRIPTORS: DISCOMFORT

## 2017-01-09 PROBLEM — L03.115 CELLULITIS OF RIGHT LEG: Status: ACTIVE | Noted: 2017-01-01

## 2017-01-09 NOTE — IP AVS SNAPSHOT
MRN:[id number]                      After Visit Summary   1/9/2017    Magy Askew    MRN: [id number]           Thank you!     Thank you for choosing [facility] for your care. Our goal is always to provide you with excellent care. Hearing back from our patients is one way we can continue to improve our services. Please take a few minutes to complete the written survey that you may receive in the mail after you visit with us. Thank you!        Patient Information     Date Of Birth          10/11/1923        About your hospital stay     You were admitted on:  January 9, 2017 You last received care in the:  [facility] [address] [facility]    You were discharged on:  January 12, 2017        Reason for your hospital stay       You were admitted with cellulitis                  Who to Call     For medical emergencies, please call 911.  For non-urgent questions about your medical care, please call your primary care provider or clinic, [phone]          Attending Provider     Provider    Jennifer Charles MD Cho, Tommy M, MD       Primary Care Provider Office Phone # Fax #    Jeff Lara [phone]       [facility] [address] [place] [id number]        After Care Instructions     Activity - Up with nursing assistance           Advance Diet as Tolerated       Follow this diet upon discharge: Orders Placed This Encounter  Low sodium            Daily weights       Call Provider for weight gain of more than 2 pounds per day or 5 pounds per week.            General info for SNF       Length of Stay Estimate: Short Term Care: Estimated # of Days <30  Condition at Discharge: Improving  Level of care:skilled   Rehabilitation Potential: Good  Admission H&P remains valid and up-to-date: Yes  Recent Chemotherapy: N/A  Use Nursing Home Standing Orders: N/A                  Follow-up Appointments     Follow Up and recommended labs and tests        "Follow up with primary care provider after discharge from TCU with BMP                  Additional Services     Occupational Therapy Adult Consult       Evaluate and treat as clinically indicated.    Reason:  cellulitis            Physical Therapy Adult Consult       Evaluate and treat as clinically indicated.    Reason:  cellulitis            Physical Therapy Adult Consult       Lymphedema therapy.  Evaluate and treat as clinically indicated.                  Pending Results     No orders found from 2017 to 1/10/2017.            Statement of Approval     Ordered          17 1348  I have reviewed and agree with all the recommendations and orders detailed in this document.   EFFECTIVE NOW     Approved and electronically signed by:  Alexi Spangler MD             Admission Information        Provider Department Dept Phone    2017 Chris Short MD  [facility] [phone]      Your Vitals Were     Blood Pressure Pulse Temperature    156/67 mmHg 68 98.2  F (36.8  C) (Oral)    Respirations Height Weight    16 1.651 m (5' 5\") 67.8 kg (149 lb 7.6 oz)    BMI (Body Mass Index) Pulse Oximetry       24.87 kg/m2 98%       MyChart Information     [employer] lets you send messages to your doctor, view your test results, renew your prescriptions, schedule appointments and more. To sign up, go to [email or web address] . Click on \"Log in\" on the left side of the screen, which will take you to the Welcome page. Then click on \"Sign up Now\" on the right side of the page.     You will be asked to enter the access code listed below, as well as some personal information. Please follow the directions to create your username and password.     Your access code is: [email or web address]  Expires: 2017  1:02 PM     Your access code will  in 90 days. If you need help or a new code, please call your [facility] clinic or [phone].        Care EveryWhere ID     This is your Care EveryWhere ID. This could be used by " other organizations to access your [facility] medical records  [id number]           Review of your medicines      START taking        Dose / Directions    cephALEXin 500 MG capsule   Commonly known as:  KEFLEX   Indication:  Skin and Soft Tissue Infection   Used for:  Cellulitis of right lower extremity        Dose:  500 mg   Take 1 capsule (500 mg) by mouth every 12 hours for 5 days   Quantity:  10 capsule   Refills:  0       hydrALAZINE 50 MG tablet   Commonly known as:  APRESOLINE   Used for:  Essential hypertension, benign        Dose:  50 mg   Take 1 tablet (50 mg) by mouth 3 times daily   Quantity:  60 tablet   Refills:  1       metoprolol 25 MG 24 hr tablet   Commonly known as:  TOPROL-XL   Used for:  Essential hypertension, benign        Dose:  75 mg   Take 3 tablets (75 mg) by mouth 2 times daily   Quantity:  180 tablet   Refills:  1         STOP taking     amLODIPine 10 MG tablet   Commonly known as:  NORVASC           atenolol 50 MG tablet   Commonly known as:  TENORMIN           losartan 50 MG tablet   Commonly known as:  COZAAR                Where to get your medicines      Some of these will need a paper prescription and others can be bought over the counter. Ask your nurse if you have questions.     Bring a paper prescription for each of these medications    - cephALEXin 500 MG capsule  - hydrALAZINE 50 MG tablet  - metoprolol 25 MG 24 hr tablet             Protect others around you: Learn how to safely use, store and throw away your medicines at www.disposemymeds.org.             Medication List: This is a list of all your medications and when to take them. Check marks below indicate your daily home schedule. Keep this list as a reference.      Medications           Morning Afternoon Evening Bedtime As Needed    cephALEXin 500 MG capsule   Commonly known as:  KEFLEX   Take 1 capsule (500 mg) by mouth every 12 hours for 5 days                                hydrALAZINE 50 MG tablet   Commonly known  as:  APRESOLINE   Take 1 tablet (50 mg) by mouth 3 times daily   Last time this was given:  50 mg on 1/12/2017  8:02 AM                                metoprolol 25 MG 24 hr tablet   Commonly known as:  TOPROL-XL   Take 3 tablets (75 mg) by mouth 2 times daily   Last time this was given:  50 mg on 1/12/2017  8:02 AM

## 2017-01-09 NOTE — IP AVS SNAPSHOT
"[facility] [address] [facility]: [phone]                                              INTERAGENCY TRANSFER FORM - PHYSICIAN ORDERS   2017                    Hospital Admission Date: 2017  JARRETT LOPEZ   : 10/11/1923  Sex: Female        Attending Provider: Chris Short MD     Allergies:  Dust Mites, Influenza A, H1n1, Strawberry, Terazosin    Infection:  None   Service:  HOSPITALIST    Ht:  1.651 m (5' 5\")   Wt:  67.8 kg (149 lb 7.6 oz)   Admission Wt:  65.318 kg (144 lb)    BMI:  24.87 kg/m 2   BSA:  1.76 m 2            Patient PCP Information     Provider PCP Type    Jeff Lara MD General      ED Clinical Impression     Diagnosis Description Comment Added By Time Added    Cellulitis of right lower extremity [L03.115] Cellulitis of right lower extremity [L03.115]  Jennifer Charles MD 2017  1:08 PM    Bilateral leg edema [R60.0] Bilateral leg edema [R60.0] R>L Jennifer Charles MD 2017  1:09 PM    CRF (chronic renal failure), unspecified stage [N18.9] CRF (chronic renal failure), unspecified stage [N18.9]  Jennifer Charles MD 2017  1:11 PM    Congestive heart failure, unspecified congestive heart failure chronicity, unspecified congestive heart failure type (H) [I50.9] Congestive heart failure, unspecified congestive heart failure chronicity, unspecified congestive heart failure type (H) [I50.9]  Jennifer Charles MD 2017  1:15 PM      Hospital Problems as of 2017              Priority Class Noted POA    Cellulitis of right leg Medium  2017 Yes      Non-Hospital Problems as of 2017              Priority Class Noted    Essential hypertension, benign   3/2/2006    CARDIOVASCULAR SCREENING; LDL GOAL LESS THAN 130   2012    ACP (advance care planning)   2012    Myalgia Medium  2016    CKD (chronic kidney disease) stage 3, GFR 30-59 ml/min Medium  2016      Code Status History     Date Active Date Inactive Code Status " Order ID Comments User Context    1/12/2017  1:48 PM  Full Code [id number]  Alexi Spangler MD Outpatient    1/9/2017  2:18 PM 1/12/2017  1:48 PM Full Code [id number]  Chris Short MD Inpatient    10/30/2011 10:54 AM 1/9/2017  2:18 PM Full Code [id number]  Hubert Teixeira MD Outpatient         Medication Review      START taking        Dose / Directions Comments    cephALEXin 500 MG capsule   Commonly known as:  KEFLEX   Indication:  Skin and Soft Tissue Infection   Used for:  Cellulitis of right lower extremity        Dose:  500 mg   Take 1 capsule (500 mg) by mouth every 12 hours for 5 days   Quantity:  10 capsule   Refills:  0        hydrALAZINE 50 MG tablet   Commonly known as:  APRESOLINE   Used for:  Essential hypertension, benign        Dose:  50 mg   Take 1 tablet (50 mg) by mouth 3 times daily   Quantity:  60 tablet   Refills:  1        metoprolol 25 MG 24 hr tablet   Commonly known as:  TOPROL-XL   Used for:  Essential hypertension, benign        Dose:  75 mg   Take 3 tablets (75 mg) by mouth 2 times daily   Quantity:  180 tablet   Refills:  1          STOP taking     amLODIPine 10 MG tablet   Commonly known as:  NORVASC           atenolol 50 MG tablet   Commonly known as:  TENORMIN           losartan 50 MG tablet   Commonly known as:  COZAAR                   Summary of Visit     Reason for your hospital stay       You were admitted with cellulitis             After Care     Activity - Up with nursing assistance           Advance Diet as Tolerated       Follow this diet upon discharge: Orders Placed This Encounter  Low sodium       Daily weights       Call Provider for weight gain of more than 2 pounds per day or 5 pounds per week.       General info for SNF       Length of Stay Estimate: Short Term Care: Estimated # of Days <30  Condition at Discharge: Improving  Level of care:skilled   Rehabilitation Potential: Good  Admission H&P remains valid and up-to-date: Yes  Recent Chemotherapy:  N/A  Use Nursing Home Standing Orders: N/A             Referrals     Occupational Therapy Adult Consult       Evaluate and treat as clinically indicated.    Reason:  cellulitis       Physical Therapy Adult Consult       Evaluate and treat as clinically indicated.    Reason:  cellulitis       Physical Therapy Adult Consult       Lymphedema therapy.  Evaluate and treat as clinically indicated.             Follow-Up Appointment Instructions     Future Labs/Procedures    Follow Up and recommended labs and tests     Comments:    Follow up with primary care provider after discharge from TCU with BMP      Follow-Up Appointment Instructions     Follow Up and recommended labs and tests       Follow up with primary care provider after discharge from TCU with BMP             Statement of Approval     Ordered          01/12/17 1348  I have reviewed and agree with all the recommendations and orders detailed in this document.   EFFECTIVE NOW     Approved and electronically signed by:  Alexi Spangler MD

## 2017-01-09 NOTE — PHARMACY-ADMISSION MEDICATION HISTORY
Admission medication history interview status for the 1/9/2017  admission is complete. See EPIC admission navigator for prior to admission medications     Medication history source reliability:Good, patient and family member    Actions taken by pharmacist (provider contacted, etc):None     Additional medication history information not noted on PTA med list :None    Medication reconciliation/reorder completed by provider prior to medication history? No    Time spent in this activity: 5 minutes    Prior to Admission medications    Medication Sig Last Dose Taking? Auth Provider   losartan (COZAAR) 50 MG tablet Take 1 tablet (50 mg) by mouth daily 1/9/2017 at am Yes Jeff Lara MD   atenolol (TENORMIN) 50 MG tablet Take 1 tablet (50 mg) by mouth daily 1/9/2017 at am Yes Jeff Lara MD   amLODIPine (NORVASC) 10 MG tablet Take 1 tablet (10 mg) by mouth daily 1/9/2017 at am Yes Jeff Lara MD

## 2017-01-09 NOTE — PROGRESS NOTES
Pt arrived to floor 1430. Pt A & O x 4. IV infusing. Up with SBA. Low NA/Low fat diet. Edema, redness to R leg.Tele monitored. Will continue to monitor.

## 2017-01-09 NOTE — IP AVS SNAPSHOT
` ` Patient Information     Patient Name Sex     Magy Askew ([id number]) Female 10/11/1923       Room Bed    6603 6603-02      Patient Demographics     Address Phone    [address]  [facility] [address] [phone] (Home)      Patient Ethnicity & Race     Ethnic Group Patient Race    American White      Emergency Contact(s)     Name Relation Home Work Mobile    Hao Askew [phone]      Documents on File        Status Date Received Description       Documents for the Patient    Privacy Notice - Onward Received 12     Face Sheet  06     Insurance Card  06     Insurance Card  08     Patient ID Received 10/28/11     Consent for Services - Hospital/Clinic       Insurance Card Received 10/28/11     Insurance Card Received 10/28/11     Consent for Services - Hospital/Clinic Received 12     External Medication Information Consent Accepted 12     Insurance Card Received 12     Consent to Communicate Received 12     Consent for EHR Access  13 Copied from existing Consent for services - C/HOD collected on 2012    Insurance Card Received 13 medica and medicare    [facility] Specified Other       Insurance Card Received 03/10/14     Insurance Card Received 03/10/14     Consent for Services - Hospital/Clinic Received 03/10/14     Consent for Services/Privacy Notice - Hospital/Clinic Received 16     Insurance Card Received 16 medica       Documents for the Encounter    CMS IM for Patient Signature Received 01/10/17 1MM      Admission Information     Attending Provider Admitting Provider Admission Type Admission Date/Time    Chris Short MD Cho, Tommy M, MD Emergency 17  0941    Discharge Date Hospital Service Auth/Cert Status Service Area     Hospitalist Incomplete [facility]    Unit Room/Bed Admission Status     [facility] 6603/6603-02 Admission (Confirmed)            Admission      Complaint    Cellulitis of right leg      Hospital Account     Name Acct ID Class Status Primary Coverage    Jamilah Magy PELAEZ [id number] Inpatient Open MEDICARE - MEDICARE FOR HB SUPPLEMENT            Guarantor Account (for Hospital Account #[id number])     Name Relation to Pt Service Area Active? Acct Type    Jamilah Magy PELAEZ  FCS Yes Personal/Family    Address Phone          [address]  [place] [address] [phone](H)              Coverage Information (for Hospital Account #[id number])     1. MEDICARE/MEDICARE FOR HB SUPPLEMENT     F/O Payor/Plan Precert #    MEDICARE/MEDICARE FOR HB SUPPLEMENT     Subscriber Subscriber #    JamilahMagy smith [id number]    Address Phone    ATTN CLAIMS  [address]  [facility] [place] [address] [phone]          2. MEDICA/MEDICA PRIME SOLUTION     F/O Payor/Plan Precert #    MEDICA/MEDICA PRIME SOLUTION     Subscriber Subscriber #    Magy Askew [id number]    Address Phone    [address] [address]  [place] [address] [phone]

## 2017-01-09 NOTE — IP AVS SNAPSHOT
"` `           [facility] [address] [facility]: [phone]                                              INTERAGENCY TRANSFER FORM - NURSING   2017                    Hospital Admission Date: 2017  JARRETT LOPEZ   : 10/11/1923  Sex: Female        Attending Provider: Chris Short MD     Allergies:  Dust Mites, Influenza A, H1n1, Strawberry, Terazosin    Infection:  None   Service:  HOSPITALIST    Ht:  1.651 m (5' 5\")   Wt:  67.8 kg (149 lb 7.6 oz)   Admission Wt:  65.318 kg (144 lb)    BMI:  24.87 kg/m 2   BSA:  1.76 m 2            Patient PCP Information     Provider PCP Type    Jeff Lara MD General      Current Code Status     Date Active Code Status Order ID Comments User Context       Prior      Code Status History     Date Active Date Inactive Code Status Order ID Comments User Context    2017  1:48 PM  Full Code [id number]  Alexi Spangler MD Outpatient    2017  2:18 PM 2017  1:48 PM Full Code [id number]  Chris Short MD Inpatient    10/30/2011 10:54 AM 2017  2:18 PM Full Code [id number]  Hubert Teixeira MD Outpatient      Advance Directives        Does patient have a scanned Advance Directive/ACP document in EPIC?           Yes        Hospital Problems as of 2017              Priority Class Noted POA    Cellulitis of right leg Medium  2017 Yes      Non-Hospital Problems as of 2017              Priority Class Noted    Essential hypertension, benign   3/2/2006    CARDIOVASCULAR SCREENING; LDL GOAL LESS THAN 130   2012    ACP (advance care planning)   2012    Myalgia Medium  2016    CKD (chronic kidney disease) stage 3, GFR 30-59 ml/min Medium  2016      Immunizations     Name Date      Pneumococcal (PCV 13) 16     Pneumococcal 23 valent 97     TD (ADULT, 7+) 13          END      ASSESSMENT     Discharge Profile Flowsheet     EXPECTED DISCHARGE     Patient's communication style  spoken language " "(English or Bilingual) 01/09/17 0941    Expected Discharge Date  01/12/17 01/11/17 1441   SKIN      DISCHARGE NEEDS ASSESSMENT     Inspection  Full 01/11/17 1222    Confirm patient is eligible for [facility] telemonitoring  No - b/c of discharge location 01/11/17 0958   Skin areas NOT inspected  Buttock, left;Buttock, right;Hip, right;Hip, left 01/11/17 0458    Discussed w/pt use of [facility] telemonitoring and told [facility] staff would call within 72 hours  No 01/11/17 0958   Skin WDL  ex 01/12/17 1054    Is patient in another telemonitoring program  No 01/11/17 0958   Skin Temperature  warm 01/12/17 1054    GASTROINTESTINAL (ADULT,PEDIATRIC,OB)     Skin Moisture  flaky;dry 01/12/17 1054    GI WDL  WDL 01/12/17 1054   Skin Integrity  abrasion(s) 01/12/17 1054    Last Bowel Movement  01/11/17 01/11/17 1259   Additional Documentation  Wound (LDA) 01/10/17 0320    Passing flatus  yes 01/10/17 1329   SAFETY      COMMUNICATION ASSESSMENT     Safety WDL  WDL 01/12/17 1054                 Assessment WDL (Within Defined Limits) Definitions           Safety WDL     Effective: 09/28/15    Row Information: <b>WDL Definition:</b> Bed in low position, wheels locked; call light in reach; upper side rails up x 2; ID band on<br> <font color=\"gray\"><i>Item=AS safety wdl>>List=AS safety wdl>>Version=F14</i></font>      Skin WDL     Effective: 09/28/15    Row Information: <b>WDL Definition:</b> Warm; dry; intact; elastic; without discoloration; pressure points without redness<br> <font color=\"gray\"><i>Item=AS skin wdl>>List=AS skin wdl>>Version=F14</i></font>      Vitals     Vital Signs Flowsheet     QUICK ADDS     Weight  67.8 kg (149 lb 7.6 oz) 01/12/17 0633    Quick Adds  EKG Monitoring 01/11/17 2152   BSA (Calculated - sq m)  1.73 01/09/17 0944    VITAL SIGNS     BMI (Calculated)  24.01 01/09/17 0944    Temp  98.2  F (36.8  C) 01/12/17 0044   LAN COMA SCALE      Temp src  Oral 01/12/17 1051   Best Eye Response  4-->(E4) spontaneous " "01/09/17 1055    Resp  16 01/12/17 1051   Best Motor Response  6-->(M6) obeys commands 01/09/17 1055    Pulse  68 01/12/17 0147   Best Verbal Response  5-->(V5) oriented 01/09/17 1055    Heart Rate  69 01/12/17 1051   [place] Coma Scale Score  15 01/09/17 1055    Pulse/Heart Rate Source  Monitor 01/12/17 1051   POSITIONING      BP  156/67 mmHg 01/12/17 1051   Body Position  independently positioning 01/12/17 0216    BP Location  Left arm 01/12/17 1051   Head of Bed (HOB)  HOB at 20-30 degrees 01/12/17 0216    OXYGEN THERAPY     Positioning/Transfer Devices  pillows 01/11/17 1222    SpO2  98 % 01/12/17 1051   DAILY CARE      O2 Device  None (Room air) 01/12/17 0209   Activity Type  ambulated to bathroom;up in chair 01/12/17 1054    PAIN/COMFORT     Activity Level of Assistance  assistance, stand-by 01/12/17 1054    Patient Currently in Pain  denies 01/12/17 1051   Activity Assistive Device  gait belt;walker 01/12/17 1054    0-10 Pain Scale  0 01/10/17 0059   Additional Documentation  Activity Device Assistance (Row) 01/09/17 2142    HEIGHT AND WEIGHT     EKG MONITORING      Height  1.651 m (5' 5\") 01/09/17 0944   Cardiac Regularity  Regular 01/12/17 0212    Height Method  Stated 01/09/17 0944   Cardiac Rhythm  NSR;Other (Comment) (1st degree AVB) 01/12/17 0212            Patient Lines/Drains/Airways Status    Active LINES/DRAINS/AIRWAYS     Name: Placement date: Placement time: Site: Days: Last dressing change:    Peripheral IV 01/09/17 Right 01/09/17  1132    3     Wound 01/09/17 Right Leg Erythema 01/09/17  1515  Leg  2             Patient Lines/Drains/Airways Status    Active PICC/CVC     **None**            Intake/Output Detail Report     Date Intake     Output Net    Shift P.O. I.V. IV Piggyback Total Urine Total       Day 01/11/17 0700 - 01/11/17 1459 860 -- -- 860 -- -- 860    Karin 01/11/17 1500 - 01/11/17 2259 -- -- -- -- 400 400 -400    Noc 01/11/17 2300 - 01/12/17 0659 -- -- -- -- -- -- 0    Day 01/12/17 " 0700 - 01/12/17 1459 320 -- -- 320 -- -- 320    Karin 01/12/17 1500 - 01/12/17 2259 -- -- -- -- -- -- 0      Last Void/BM       Most Recent Value    Urine Occurrence 1 at 01/11/2017 2346    Stool Occurrence 1 [missed hat] at 01/11/2017 2346      Case Management/Discharge Planning     Case Management/Discharge Planning Flowsheet     REFERRAL INFORMATION     ABUSE RISK SCREEN      Arrived From  home;emergency department 10/28/11 1805   QUESTION TO PATIENT:  Has a member of your family or a partner(now or in the past) intimidated, hurt, manipulated, or controlled you in any way?  patient declined to answer or is unable to answer 01/09/17 0949    LIVING ENVIRONMENT     QUESTION TO PATIENT: Do you feel safe going back to the place where you are living?  patient declined to answer or is unable to answer 01/09/17 0949    Lives With  alone 01/11/17 1105   OBSERVATION: Is there reason to believe there has been maltreatment of a vulnerable adult (ie. Physical/Sexual/Emotional abuse, self neglect, lack of adequate food, shelter, medical care, or financial exploitation)?  no 01/09/17 0949    COPING/STRESS     (R) MENTAL HEALTH SUICIDE RISK      Major Change/Loss/Stressor  none 01/11/17 1107   Are you depressed or being treated for depression?  No 01/11/17 1106    EXPECTED DISCHARGE     HOMICIDE RISK      Expected Discharge Date  01/12/17 01/11/17 1441   Homicidal Ideation  no 01/09/17 0949

## 2017-01-09 NOTE — ED PROVIDER NOTES
"  History     Chief Complaint:  Leg swelling    HPI   Magy Askew is a 89 year old female, with a history of hypertension, chronic leg edema, and CKD stage 3, who presents with leg swelling. The patient reports that beginning about 2-3 weeks ago her legs were noticed as edematous and progressively worsening erythematous and irritation. This has only worsened since the onset, prompting her to come here for evaluation. She notes some minor shortness of breath with exertion. She denies an abdominal pain, diarrhea, gait problems, nausea, vomiting, or fever.    Allergies:  Dust mites  Influenza A, H1n1 - nausea and vomiting and hives and syncope  Strawberry  Terazosin - hives     Medications:    Roxicodone  Cozaar  Deltasone  Tenormin  Norvasc     Past Medical History:    Hypertension  CKD stage 3    Past Surgical History:    History reviewed. No pertinent surgical history.     Family History:    CAD     Social History:  Smoking status: never  Alcohol use: no  Marital Status:       Review of Systems   Constitutional: Negative for fever.   Respiratory: Positive for shortness of breath.    Cardiovascular: Positive for leg swelling.   Gastrointestinal: Negative for nausea, vomiting, abdominal pain and diarrhea.   Musculoskeletal: Negative for gait problem.   Skin: Positive for color change (erythematous leg bilaterally) and rash.   All other systems reviewed and are negative.      Physical Exam     Patient Vitals for the past 24 hrs:   BP Temp Temp src Pulse Resp SpO2 Height Weight   01/09/17 1249 196/81 mmHg - - 61 - - - -   01/09/17 1247 199/83 mmHg - - 64 18 100 % - -   01/09/17 0943 186/75 mmHg 98.3  F (36.8  C) Oral 69 16 98 % 1.651 m (5' 5\") 65.318 kg (144 lb)        Physical Exam  Nursing note and vitals reviewed.  Constitutional:  Appears well-developed and well-nourished, comfortable.   HENT:   Head:   No evidence of facial or scalp trauma.  Nose:    Nose normal.   Mouth/Throat:  Mucosa is moist.  Eyes: "    Conjunctivae are normal.      Pupils are equal, round, and reactive to light.      Right eye exhibits no discharge. Left eye exhibits no discharge.      No scleral icterus.   Cardiovascular:  Normal rate, regular rhythm.      Normal heart sounds and intact distal pulses.       No murmur heard.  Pulmonary/Chest:  Effort normal and breath sounds normal. No respiratory distress.     No wheezes. No rales. No chest wall tenderness. No stridor.   Abdominal:   Soft. No distension and no mass. No tenderness.      No rebound and no guarding. No flank pain.  Musculoskeletal:  Normal range of motion.      no tenderness.                                       Neck supple, no midline cervical tenderness.      Bilateral leg edema. 3+ pitting edema to right knee. 3 wounds that are scabbed    over on anterior skin on right shin. There is slight increase in warmth of right shin    skin, but the same as the left shin.     2+ pitting edema to left knee. Slight hyperemia to medial left lower leg.  Neurological:   Alert and oriented to person, place, and year.      No cranial nerve deficit.      Exhibits normal muscle tone. Coordination normal.      GCS eye subscore is 4. GCS verbal subscore is 5.      GCS motor subscore is 6.   Skin:    Skin is warm and dry. No rash noted. No diaphoresis.      No erythema. No pallor.   Psychiatric:   Behavior is normal. Judgment and thought content normal.       Emergency Department Course   ECG (11:03:34):  Rate 57 bpm. WA interval 218. QRS duration 84. QT/QTc 426/414. P-R-T axes 5 61 -8.   Sinus bradycardia with 1st degree AV block.   Abnormal QRS-T angle, consider primary T wave abnormality.   Abnormal ECG.   T wave inversion at lead III NEW.   Changes noted above from prior EKG dated 11/30/16.  Interpreted at 1110 by Jennifer Charles MD.    Imaging:  Radiographic findings were communicated with the patient who voiced understanding of the findings.    Chest XR, PA & LAT  IMPRESSION:  Heart size  probably upper limits of normal. Lungs are  clear. Pulmonary vasculature within normal limits. Mild aortic  calcification.   As read by Radiology.    US lower extremity venous duplex right  IMPRESSION:   1. There is no evidence of deep venous thrombosis in the right lower  extremity.   2. There is a 4.7 cm Baker's cyst.  As read by Radiology.    Laboratory:  CBC: HGB 10.5 (L), o/w WNL (WBC 10.0, )   BMP: Glucose 115 (H), BUN 38 (H), Creatinine 3.03 (H), GFR estimate 14 (L), Calcium 7.9 (L), o/w WNL  1053 Troponin: <0.015  BNP: 8137 (H)  1053 Troponin: <0.015    Interventions:  1313 - Ancef 1 g IV    Emergency Department Course:  Past medical records, nursing notes, and vitals reviewed.  1020: I performed an exam of the patient and obtained history, as documented above.  IV inserted and blood drawn.  The patient was sent for a X-ray and ultrasound while in the emergency department, findings above.  1305: I discussed the case with Dr. Short regarding the patient.  Findings and plan explained to the Patient who consents to admission.   1310: Discussed the patient with Dr. Short, who will admit the patient to a medical bed for further monitoring, evaluation, and treatment.       Impression & Plan      Medical Decision Making:  Magy Askew is a 89 year old female who presents for evaluation of leg swelling. She has been scratching at the leg. It has now gotten more red, but no increased pain and no fevers. She does not have any adenopathy in her right groin. The right leg is definitely more edematous than the left, so I did get an ultrasound which was negative other than a Baker's cyst. She does have an elevated BNP of 8137, but troponin is normal and EKG does not show any ischemic change. She has denied any chest pain, shortness of breath maybe a little bit more recently but certainly not profound. She has chronic renal failure that has actually gotten a little worse with the GFR now at 14, her creatinine is  3.03. Normal electrolytes, CBC is normal other than a hemoglobin at 10.5 which is stable. Chest X-ray does not reveal any acute findings. With the patient's increased swelling, renal failure, the fact that she has early cellulitis from digging at her leg, I am going to start her on antibiotics Ancef 1 g IV was given. Further dosing will be determined by pharmacy because of her renal failure. With the worsening renal failure and edema, she will likely need a cardiac echo and a diuresis may be difficult. Her blood pressure was quite high, although she runs high blood pressures, although it was 196 and persistent up there so I gave her 10 of Labetalol IV. I talked to Dr. Short who will be admitting the patient.     Disposition:   Admit to Dr. Short, IV antibiotics dosing per pharmacy, monitor renal function. Consider cardiac echo and blood pressure management with medication.     Diagnosis:    ICD-10-CM    1. Cellulitis of right lower extremity L03.115    2. Bilateral leg edema R60.0     R>L   3. CRF (chronic renal failure), unspecified stage N18.9    4. Congestive heart failure, unspecified congestive heart failure chronicity, unspecified congestive heart failure type (H) I50.9        Disposition:  Admitted to Dr. Short.    Discharge Medications:  New Prescriptions    No medications on file         Octavio Edouard  1/9/2017    EMERGENCY DEPARTMENT    I, Octavio Edouard, am serving as a scribe at 10:20 AM on 1/9/2017 to document services personally performed by Jennifer Charles MD based on my observations and the provider's statements to me.       Jennifer Charles MD  01/09/17 1428

## 2017-01-09 NOTE — IP AVS SNAPSHOT
"          [place] [address] [facility]: [phone]                                              INTERAGENCY TRANSFER FORM - LAB / IMAGING / EKG / EMG RESULTS   2017                    Hospital Admission Date: 2017  JARRETT LOPEZ   : 10/11/1923  Sex: Female        Attending Provider: Chris Short MD     Allergies:  Dust Mites, Influenza A, H1n1, Strawberry, Terazosin    Infection:  None   Service:  HOSPITALIST    Ht:  1.651 m (5' 5\")   Wt:  67.8 kg (149 lb 7.6 oz)   Admission Wt:  65.318 kg (144 lb)    BMI:  24.87 kg/m 2   BSA:  1.76 m 2            Patient PCP Information     Provider PCP Type    Jeff Lara MD General         Lab Results - 3 Days (17 - 17)      Platelet count [[id number]]  Resulted: 17 0812, Result status: Final result    Ordering provider: Chris Short MD  17 0000 Resulting lab: [facility]    Specimen Information    Type Source Collected On   Blood  17 0740          Components       Value Reference Range Flag Lab   Platelet Count 274 150 - 450 10e9/L  FrStHsLb            Basic metabolic panel [[id number]] (Abnormal)  Resulted: 17 0753, Result status: Final result    Ordering provider: Alexi Spangler MD  17 0000 Resulting lab: [facility]    Specimen Information    Type Source Collected On   Blood  17 0726          Components       Value Reference Range Flag Lab   Sodium 141 133 - 144 mmol/L  FrStHsLb   Potassium 4.3 3.4 - 5.3 mmol/L  FrStHsLb   Chloride 111 94 - 109 mmol/L H FrStHsLb   Carbon Dioxide 23 20 - 32 mmol/L  FrStHsLb   Anion Gap 7 3 - 14 mmol/L  FrStHsLb   Glucose 96 70 - 99 mg/dL  FrStHsLb   Urea Nitrogen 37 7 - 30 mg/dL H FrStHsLb   Creatinine 2.84 0.52 - 1.04 mg/dL H FrStHsLb   GFR Estimate 16 >60 mL/min/1.7m2 L FrStHsLb   Comment:  Non  GFR Calc   GFR Estimate If Black 19 >60 mL/min/1.7m2 L FrStHsLb   Comment:  African American GFR Calc   Calcium " 7.7 8.5 - 10.1 mg/dL L FrStHsLb            CBC with platelets [[id number]] (Abnormal)  Resulted: 01/11/17 0733, Result status: Final result    Ordering provider: Alexi Spangler MD  01/11/17 0000 Resulting lab: [facility]    Specimen Information    Type Source Collected On   Blood  01/11/17 0726          Components       Value Reference Range Flag Lab   WBC 7.0 4.0 - 11.0 10e9/L  FrStHsLb   RBC Count 3.20 3.8 - 5.2 10e12/L L FrStHsLb   Hemoglobin 9.5 11.7 - 15.7 g/dL L FrStHsLb   Hematocrit 29.4 35.0 - 47.0 % L FrStHsLb   MCV 92 78 - 100 fl  FrStHsLb   MCH 29.7 26.5 - 33.0 pg  FrStHsLb   MCHC 32.3 31.5 - 36.5 g/dL  FrStHsLb   RDW 13.9 10.0 - 15.0 %  FrStHsLb   Platelet Count 216 150 - 450 10e9/L  FrStHsLb            Fractional excretion of sodium [[id number]]  Resulted: 01/10/17 2235, Result status: Final result    Ordering provider: Alexi Spangler MD  01/10/17 1256 Resulting lab: [facility]    Specimen Information    Type Source Collected On   Urine Urine clean catch 01/10/17 2125          Components       Value Reference Range Flag Lab   Creatinine Urine 135 mg/dL  FrStHsLb   Sodium Urine mmol/L 35 mmol/L  FrStHsLb   %FENA 0.5 %  FrStHsLb   Comment:         Adult:    <1 percent             Indicates prerenal azotemia             >3 percent             Suggests acute tubular             necrosis   Neonates: <2.5 percent             Suggest prerenal azotemia             >2.5 percent             Suggest acute tubular necrosis              Sodium [[id number]]  Resulted: 01/10/17 2233, Result status: Final result    Ordering provider: Alexi Spangler MD  01/10/17 2131 Resulting lab: [facility]    Specimen Information    Type Source Collected On   Blood  01/10/17 2200          Components       Value Reference Range Flag Lab   Sodium 141 133 - 144 mmol/L  FrStHsLb            Creatinine [[id number]] (Abnormal)  Resulted: 01/10/17 2233, Result status: Final result     Ordering provider: Alexi Spangler MD  01/10/17 2133 Resulting lab: [facility]    Specimen Information    Type Source Collected On   Blood  01/10/17 2200          Components       Value Reference Range Flag Lab   Creatinine 2.91 0.52 - 1.04 mg/dL H FrStHsLb   GFR Estimate 15 >60 mL/min/1.7m2 L FrStHsLb   Comment:  Non  GFR Calc   GFR Estimate If Black 18 >60 mL/min/1.7m2 L FrStHsLb   Comment:   GFR Calc            UA with Microscopic reflex to Culture [[id number]] (Abnormal)  Resulted: 01/10/17 2149, Result status: Final result    Ordering provider: Alexi Spangler MD  01/10/17 1304 Resulting lab: [facility]    Specimen Information    Type Source Collected On   Urine Urine clean catch 01/10/17 2125          Components       Value Reference Range Flag Lab   Color Urine Yellow   FrStHsLb   Appearance Urine Clear   FrStHsLb   Glucose Urine 150 NEG mg/dL A FrStHsLb   Bilirubin Urine Negative NEG   FrStHsLb   Ketones Urine Negative NEG mg/dL  FrStHsLb   Specific [place] Urine 1.018 1.003 - 1.035   FrStHsLb   Blood Urine Trace NEG  A FrStHsLb   pH Urine 6.5 5.0 - 7.0 pH  FrStHsLb   Protein Albumin Urine >600 NEG mg/dL A FrStHsLb   Urobilinogen mg/dL Normal 0.0 - 2.0 mg/dL  FrStHsLb   Nitrite Urine Negative NEG   FrStHsLb   Leukocyte Esterase Urine Negative NEG   FrStHsLb   Source Midstream Urine   FrStHsLb   WBC Urine 5 0 - 2 /HPF H FrStHsLb   RBC Urine <1 0 - 2 /HPF  FrStHsLb   Squamous Epithelial /HPF Urine <1 0 - 1 /HPF  FrStHsLb            Basic metabolic panel [[id number]] (Abnormal)  Resulted: 01/10/17 0809, Result status: Final result    Ordering provider: Chris Short MD  01/10/17 0000 Resulting lab: [facility]    Specimen Information    Type Source Collected On   Blood  01/10/17 0725          Components       Value Reference Range Flag Lab   Sodium 143 133 - 144 mmol/L  FrStHsLb   Potassium 4.4 3.4 - 5.3 mmol/L  FrStHsLb    Chloride 111 94 - 109 mmol/L H FrStHsLb   Carbon Dioxide 22 20 - 32 mmol/L  FrStHsLb   Anion Gap 10 3 - 14 mmol/L  FrStHsLb   Glucose 103 70 - 99 mg/dL H FrStHsLb   Urea Nitrogen 36 7 - 30 mg/dL H FrStHsLb   Creatinine 2.90 0.52 - 1.04 mg/dL H FrStHsLb   GFR Estimate 15 >60 mL/min/1.7m2 L FrStHsLb   Comment:  Non  GFR Calc   GFR Estimate If Black 18 >60 mL/min/1.7m2 L FrStHsLb   Comment:  African American GFR Calc   Calcium 7.8 8.5 - 10.1 mg/dL L FrStHsLb            Platelet count [[id number]]  Resulted: 01/10/17 0743, Result status: Final result    Ordering provider: Chris Short MD  01/10/17 0000 Resulting lab: [facility]    Specimen Information    Type Source Collected On   Blood  01/10/17 0725          Components       Value Reference Range Flag Lab   Platelet Count 214 150 - 450 10e9/L  FrStHsLb            Glucose by meter [[id number]] (Abnormal)  Resulted: 01/10/17 0156, Result status: Final result    Ordering provider: Chris Short MD  01/10/17 0146 Resulting lab: POINT OF CARE TEST, GLUCOSE    Specimen Information    Type Source Collected On     01/10/17 0146          Components       Value Reference Range Flag Lab   Glucose 100 70 - 99 mg/dL H 170            Troponin I [[id number]]  Resulted: 01/10/17 0036, Result status: Final result    Ordering provider: Chris Short MD  01/09/17 1800 Resulting lab: [facility]    Specimen Information    Type Source Collected On   Blood  01/10/17 0012          Components       Value Reference Range Flag Lab   Troponin I ES 0.016 0.000 - 0.045 ug/L  FrStHsLb   Comment:         The 99th percentile for upper reference range is 0.045 ug/L.  Troponin values   in   the range of 0.045 - 0.120 ug/L may be associated with risks of adverse   clinical events.              Troponin I [[id number]]  Resulted: 01/09/17 1925, Result status: Final result    Ordering provider: Chris Short MD  01/09/17 1418 Resulting lab: [facility]    Specimen Information    Type Source Collected On   Blood  01/09/17 1843          Components       Value Reference Range Flag Lab   Troponin I ES 0.017 0.000 - 0.045 ug/L  FrStHsLb   Comment:         The 99th percentile for upper reference range is 0.045 ug/L.  Troponin values   in   the range of 0.045 - 0.120 ug/L may be associated with risks of adverse   clinical events.              Troponin I (now) [[id number]]  Resulted: 01/09/17 1138, Result status: Final result    Ordering provider: Jennifer Charles MD  01/09/17 1053 Resulting lab: [facility]    Specimen Information    Type Source Collected On   Blood  01/09/17 1053          Components       Value Reference Range Flag Lab   Troponin I ES -- 0.000 - 0.045 ug/L  FrStHsLb   Result:         <0.015  The 99th percentile for upper reference range is 0.045 ug/L.  Troponin values in   the range of 0.045 - 0.120 ug/L may be associated with risks of adverse   clinical events.              BNP [[id number]] (Abnormal)  Resulted: 01/09/17 1138, Result status: Final result    Ordering provider: Jennifer Charles MD  01/09/17 1053 Resulting lab: [facility]    Specimen Information    Type Source Collected On   Blood  01/09/17 1053          Components       Value Reference Range Flag Lab   N-Terminal Pro BNP Inpatient 8137 0 - 1800 pg/mL H FrStHsLb   Comment:         Reference range shown and results flagged as abnormal are suggested inpatient   cut points for confirming diagnosis if CHF in an acute setting. Establishing   a   baseline value for each individual patient is useful for follow-up. An   inpatient or emergency department NT-proPBNP <300 pg/mL effectively rules out   acute CHF, with 99% negative predictive value.  The outpatient non-acute reference range for ruling out CHF is:   0-125 pg/mL (age 18 to less than 75)   0-450 pg/mL (age 75 yrs and older)              Basic metabolic panel [[id number]] (Abnormal)   Resulted: 01/09/17 1136, Result status: Final result    Ordering provider: Jennifer Charles MD  01/09/17 1053 Resulting lab: [facility]    Specimen Information    Type Source Collected On   Blood  01/09/17 1053          Components       Value Reference Range Flag Lab   Sodium 137 133 - 144 mmol/L  FrStHsLb   Potassium 4.1 3.4 - 5.3 mmol/L  FrStHsLb   Chloride 105 94 - 109 mmol/L  FrStHsLb   Carbon Dioxide 22 20 - 32 mmol/L  FrStHsLb   Anion Gap 10 3 - 14 mmol/L  FrStHsLb   Glucose 115 70 - 99 mg/dL H FrStHsLb   Urea Nitrogen 38 7 - 30 mg/dL H FrStHsLb   Creatinine 3.03 0.52 - 1.04 mg/dL H FrStHsLb   GFR Estimate 14 >60 mL/min/1.7m2 L FrStHsLb   Comment:  Non  GFR Calc   GFR Estimate If Black 17 >60 mL/min/1.7m2 L FrStHsLb   Comment:  African American GFR Calc   Calcium 7.9 8.5 - 10.1 mg/dL L FrStHsLb            CBC with platelets + differential [[id number]] (Abnormal)  Resulted: 01/09/17 1114, Result status: Final result    Ordering provider: Jennifer Charles MD  01/09/17 1053 Resulting lab: [facility]    Specimen Information    Type Source Collected On   Blood  01/09/17 1053          Components       Value Reference Range Flag Lab   WBC 10.0 4.0 - 11.0 10e9/L  FrStHsLb   RBC Count 3.52 3.8 - 5.2 10e12/L L FrStHsLb   Hemoglobin 10.5 11.7 - 15.7 g/dL L FrStHsLb   Hematocrit 31.7 35.0 - 47.0 % L FrStHsLb   MCV 90 78 - 100 fl  FrStHsLb   MCH 29.8 26.5 - 33.0 pg  FrStHsLb   MCHC 33.1 31.5 - 36.5 g/dL  FrStHsLb   RDW 13.5 10.0 - 15.0 %  FrStHsLb   Platelet Count 220 150 - 450 10e9/L  FrStHsLb   Diff Method Automated Method   FrStHsLb   % Neutrophils 76.5 %  FrStHsLb   % Lymphocytes 10.9 %  FrStHsLb   % Monocytes 10.2 %  FrStHsLb   % Eosinophils 1.8 %  FrStHsLb   % Basophils 0.2 %  FrStHsLb   % Immature Granulocytes 0.4 %  FrStHsLb   Nucleated RBCs 0 0 /100  FrStHsLb   Absolute Neutrophil 7.6 1.6 - 8.3 10e9/L  FrStHsLb   Absolute Lymphocytes 1.1 0.8 - 5.3 10e9/L   FrStHsLb   Absolute Monocytes 1.0 0.0 - 1.3 10e9/L  FrStHsLb   Absolute Eosinophils 0.2 0.0 - 0.7 10e9/L  FrStHsLb   Absolute Basophils 0.0 0.0 - 0.2 10e9/L  FrStHsLb   Abs Immature Granulocytes 0.0 0 - 0.4 10e9/L  FrStHsLb   Absolute Nucleated RBC 0.0   FrStHsLb            Testing Performed By     Lab - Abbreviation Name Director Address Valid Date Range    14 - FrStHsLb [facility] Unknown [address] [place] [address] 05/08/15 1057 - Present    170 - Unknown POINT OF CARE TEST, GLUCOSE Unknown Unknown 10/31/11 1114 - Present            Unresulted Labs (24h ago through future)    Start       Ordered    01/12/17 0600  Platelet count -  (Pharmacological Prophylaxis- heparin (If CrCl less than 30 mL/min)- UU,UR,UA,SH,RH,PH,WY)   EVERY THREE DAYS,   Routine     Comments:  Repeat every 3 days while on VTE prophylaxis. If no result is listed, this lab has not been done the past 365 days. LATEST LAB RESULT: PLATELET COUNT (10e9/L)       Date                     Value                 01/09/2017               220              ----------      01/09/17 1418    01/10/17 0600  Platelet count   EVERY THREE DAYS,   Routine     Comments:  If no result is listed, this lab has not been done the past 365 days. LATEST LAB RESULT: PLATELET COUNT (10e9/L)       Date                     Value                 01/09/2017               220              ----------    01/09/17 1433         Imaging Results - 3 Days (01/09/17 - 01/09/17)      Chest XR,  PA & LAT [[id number]]  Resulted: 01/09/17 1314, Result status: Final result    Ordering provider: Jennifer Charles MD  01/09/17 1053 Resulted by: Maribel Hurd MD    Performed: 01/09/17 1122 - 01/09/17 1129 Resulting lab: RADIOLOGY RESULTS    Narrative:       CHEST TWO VIEWS  1/9/2017 11:29 AM    HISTORY:  Shortness of breath, congestive heart failure question.    COMPARISON:  11/3/2016.      Impression:       IMPRESSION:  Heart size probably upper limits of  normal. Lungs are  clear. Pulmonary vasculature within normal limits. Mild aortic  calcification.     TRENT BELL MD    Specimen Information    Type Source Collected On                  US Lower Extremity Venous Duplex Right [[id number]]  Resulted: 01/09/17 1149, Result status: Final result    Ordering provider: Jennifer Charles MD  01/09/17 1053 Resulted by: Jelani Peter MD    Performed: 01/09/17 1132 - 01/09/17 1146 Resulting lab: RADIOLOGY RESULTS    Narrative:       RIGHT LOWER EXTREMITY VENOUS DOPPLER ULTRASOUND January 9, 2017 11:46  AM    HISTORY: Right lower extremity swelling. The concern is for deep  venous thrombosis.    COMPARISON: An ultrasound on 11/30/2016.    FINDINGS: Color flow and Doppler spectral waveform analysis  demonstrates normal blood flow in the common femoral, femoral,  popliteal, posterior tibial, and greater saphenous veins of the right  lower extremity. No thrombus is seen. Again seen is a Baker's cyst in  the popliteal fossa. This currently measures 4.7 x 4.7 x 1.7 cm.      Impression:       IMPRESSION:   1. There is no evidence of deep venous thrombosis in the right lower  extremity.   2. There is a 4.7 cm Baker's cyst.    JELANI PETER MD    Specimen Information    Type Source Collected On                  Testing Performed By     Lab - Abbreviation Name Director Address Valid Date Range    104 - Rad Rslts RADIOLOGY RESULTS Unknown Unknown 02/16/05 1553 - Present               ECG/EMG Results (01/09/17 - 01/09/17)      Echocardiogram Complete [[id number]]  Resulted: 01/09/17 1646, Result status: Final result    Ordering provider: Chris Short MD  01/09/17 1418 Resulted by: Tramaine Breg MD    Performed: 01/09/17 1626 - 01/09/17 1626 Resulting lab: RADIOLOGY RESULTS    Narrative:       Interpretation Summary                    [facility]  Echocardiography Laboratory  [address] [place] [id number]        Name: JARRETT LOPEZ  H  MRN: [id number]  : 10/11/1923  Study Date: 2017 04:06 PM  Age: 89 yrs  Gender: Female  Patient Location: [facility]  Reason For Study: Edema  Ordering Physician: GERBER PRICE  Referring Physician: NOAH HOUSTON  Performed By: Johnson Hay RDCS     BSA: 1.7 m2  Height: 65 in  Weight: 144 lb  HR: 67  BP: 174/67 mmHg  ______________________________________________________________________________        Procedure  Complete Portable Echo Adult.  ______________________________________________________________________________     Interpretation Summary     The visual ejection fraction is estimated at 65-70%.  There is mild to moderate concentric left ventricular hypertrophy.  Normal left ventricular wall motion  The right ventricle is normal in size and function.  The left atrium is mildly dilated.  Moderate (46-55mmHg) pulmonary hypertension is present.  The IVC is normal in size and reactivity with respiration, suggesting normal  central venous pressure.  There is no pericardial effusion.  Compared to prior study, there is no significant change.  ______________________________________________________________________________           Left Ventricle  The left ventricle is normal in size. There is mild to moderate concentric  left ventricular hypertrophy. The visual ejection fraction is estimated at 65  -70%. Grade I or early diastolic dysfunction. E by E prime ratio is greater  than 15, that likely suggests increased left ventricular filling pressures.  Normal left ventricular wall motion.     Right Ventricle  The right ventricle is normal in size and function.  Atria  The left atrium is mildly dilated. Right atrial size is normal. There is no  color Doppler evidence of an atrial shunt.     Mitral Valve  The mitral valve is normal in structure and function. There is trace mitral  regurgitation.     Tricuspid Valve  The tricuspid valve is not well visualized, but is grossly normal. There is  trace to mild tricuspid  regurgitation. The right ventricular systolic  pressure is approximated at 44.5 mmHg plus the right atrial pressure. Normal  IVC (1.5-2.5cm) with >50% respiratory collapse; right atrial pressure is  estimated at 5-10mmHg. Moderate (46-55mmHg) pulmonary hypertension is  present.     Aortic Valve  There is mild trileaflet aortic sclerosis. There is trace aortic  regurgitation. No aortic stenosis is present.     Pulmonic Valve  The pulmonic valve is not well visualized. There is trace pulmonic valvular  regurgitation. Normal pulmonic valve velocity.     Vessels  The aortic root is normal size. Normal size ascending aorta. The IVC is  normal in size and reactivity with respiration, suggesting normal central  venous pressure.  Pericardium  There is no pericardial effusion.     Rhythm  The rhythm was normal sinus.     ______________________________________________________________________________  MMode/2D Measurements & Calculations  IVSd: 1.4 cm  LVIDd: 4.6 cm  LVIDs: 2.8 cm  LVPWd: 1.1 cm  FS: 39.0 %  EDV(Teich): 96.1 ml  ESV(Teich): 29.3 ml  LV mass(C)d: 213.7 grams  Ao root diam: 3.3 cm  LA dimension: 3.9 cm  asc Aorta Diam: 3.5 cm  LA/Ao: 1.2  LA Volume (BP): 61.5 ml     LA Volume Index (BP): 35.8 ml/m2        Doppler Measurements & Calculations  MV E max fidencio: 91.7 cm/sec  MV A max fidencio: 122.2 cm/sec  MV E/A: 0.75  MV dec time: 0.23 sec  AI P1/2t: 355.7 msec  PA acc time: 0.15 sec  TR max fidencio: 333.5 cm/sec  TR max P.5 mmHg  Lateral E/e': 17.6  Medial E/e': 14.3              ______________________________________________________________________________        Report approved by: Jeannine Nguyen 2017 04:46 PM       1    Type Source Collected On     17 1606                  Encounter-Level Documents:     There are no encounter-level documents.      Order-Level Documents:     There are no order-level documents.

## 2017-01-09 NOTE — ED NOTES
"[facility]  ED Nurse Handoff Report    ED Chief complaint: Leg Swelling      ED Diagnosis:   Final diagnoses:   Cellulitis of right lower extremity   Bilateral leg edema - R>L   CRF (chronic renal failure), unspecified stage   Congestive heart failure, unspecified congestive heart failure chronicity, unspecified congestive heart failure type (H)       Code Status: see epic    Allergies:   Allergies   Allergen Reactions     Dust Mites      Influenza A, H1n1 Nausea and Vomiting     With hives and past out     Strawberry      Terazosin Hives       Activity level:  Stand with Assist     Needed?: No    Isolation: No  Infection: Not Applicable    Bariatric?: No      Vital Signs:   Filed Vitals:    01/09/17 0943 01/09/17 1247 01/09/17 1249   BP: 186/75 199/83 196/81   Pulse: 69 64 61   Temp: 98.3  F (36.8  C)     TempSrc: Oral     Resp: 16 18    Height: 1.651 m (5' 5\")     Weight: 65.318 kg (144 lb)     SpO2: 98% 100%        Cardiac Rhythm: ,        Pain level: 0-10 Pain Scale: 0    Is this patient confused?: No    Patient Report: Initial Complaint: c/o swelling and itching to LEs  Focused Assessment: bilateral LE swelling and redness, R> L. Open scratches to RLE with weeping. AOX4. Denies any SOB or pain, lungs are diminished but clear. BP^ but pt runs 160-180 SBP.  Tests Performed: labs, US, cxr  Abnormal Results: CHF, CKD--slightly worse than normal; US negative for DVT, probable cellulitis to LE  Treatments provided: NS at 125/hr, ancef IV; will 1 time dose iv BP meds prior to transfer     Family Comments: son at bedside. Pt is very sweet, lives independently in senior building and is very active. She is tearful at the idea of being sick and being admitted, states she's never been sick before.     OBS brochure/video discussed/provided to patient: No    ED Medications:   Medications   ceFAZolin (ANCEF) 1 g vial to attach to  ml bag for ADULT or 50 ml bag for PEDS (1 g Intravenous New " Bag 1/9/17 1313)   0.9% sodium chloride infusion (not administered)       Drips infusing?:  Yes, NS    ED NURSE PHONE NUMBER: [phone]

## 2017-01-09 NOTE — IP AVS SNAPSHOT
` `           [place] [address] [facility]: [phone]                 INTERAGENCY TRANSFER FORM - NOTES (H&P, Discharge Summary, Consults, Procedures, Therapies)   2017                    Hospital Admission Date: 2017  JARRETT LOPEZ   : 10/11/1923  Sex: Female        Patient PCP Information     Provider PCP Type    Jeff Lara MD General      History & Physicals     No notes of this type exist for this encounter.      Discharge Summaries     No notes of this type exist for this encounter.      Consult Notes     No notes of this type exist for this encounter.         Progress Notes - Physician (Notes from 17 through 17)      Progress Notes by Alexi Spangler MD at 2017  2:00 PM     Author:  Alexi Spangler MD Service:  Hospitalist Author Type:  Physician    Filed:  2017  2:02 PM Note Time:  2017  2:00 PM Status:  Signed    :  Alexi Spangler MD (Physician)           [facility]  Hospitalist Progress Note  Alexi Spangler MD  2017    Assessment and Plan  ASSESSMENT AND PLAN:  This is a 89-year-old woman who presents with increased swelling, redness and itching of her right lower extremity.    1.  RLE cellulitis  - U/S negative for DVT  - change to keflex at discharge today  - ace wraps  - lymphedema consult appreciated  - decrease in edema  - to TCU for lymphedema and balance training.  2. Hypertension.    - continue metoprolol but increase hydralazine from 25 to 50 mg tid  - discontinued amlodipine due to LE edema  -  discontinue losartan given her acute kidney injury.    3. LE edema.  - off norvasc  - Echo shows hyperdynamic LVEF with moderate pulmonary HTN and early grade I diastolic dysfunction  -She has T-wave inversion isolated in lead III, but no chest pain and troponin negative.   - her elevated BNP is likely chronically elevated in context of CKD, moderate pulmonary HTN.        4. CKD  - creatinine  "improved from 3.03 to 2.84  - discontinue losartan    5.  Deep venous thrombosis prophylaxis:  Start heparin subcu.    6.  Disposition:   - to TCU today      Code status:  Discussed with the patient and she is full code.     Interval History  - chart reviewed, echo reviewed  - decreased redness    -Data reviewed today: I reviewed all new labs and imaging over the last 24 hours. I personally reviewed no images or EKG's today.    Physical Exam  Heart Rate: 69, Blood pressure 156/67, pulse 68, temperature 98.2  F (36.8  C), temperature source Oral, resp. rate 16, height 1.651 m (5' 5\"), weight 67.8 kg (149 lb 7.6 oz), SpO2 98 %.  Filed Vitals:    01/10/17 0500 01/11/17 0503 01/12/17 0633   Weight: 65.2 kg (143 lb 11.8 oz) 65.7 kg (144 lb 13.5 oz) 67.8 kg (149 lb 7.6 oz)     Vital Signs with Ranges  Temp:  [98.2  F (36.8  C)-98.4  F (36.9  C)] 98.2  F (36.8  C)  Pulse:  [65-70] 68  Heart Rate:  [64-70] 69  Resp:  [16-18] 16  BP: (156-182)/(64-79) 156/67 mmHg  SpO2:  [95 %-98 %] 98 %  I/O's Last 24 hours  I/O last 3 completed shifts:  In: 860 [P.O.:860]  Out: 400 [Urine:400]    Constitutional: Awake, alert, cooperative, no apparent distress  Respiratory: Clear to auscultation bilaterally, no crackles or wheezing  Cardiovascular: Regular rate and rhythm, normal S1 and S2, and no murmur noted  GI: Normal bowel sounds, soft, non-distended, non-tender  Skin/Integumen: No rashes, no cyanosis, +  edema, decreased erythema   Other:      Medications  All medications were reviewed.       metoprolol  75 mg Oral BID     cephalexin  500 mg Oral Q12H     hydrALAZINE  50 mg Oral TID     heparin  5,000 Units Subcutaneous Q12H     ceFAZolin  1 g Intravenous Q12H        Data    Recent Labs  Lab 01/12/17  0740 01/11/17  0726 01/10/17  2200 01/10/17  0725 01/10/17  0012 01/09/17  1843 01/09/17  1053   WBC  --  7.0  --   --   --   --  10.0   HGB  --  9.5*  --   --   --   --  10.5*   MCV  --  92  --   --   --   --  90    216  --  214  " --   --  220   NA  --  141 141 143  --   --  137   POTASSIUM  --  4.3  --  4.4  --   --  4.1   CHLORIDE  --  111*  --  111*  --   --  105   CO2  --  23  --  22  --   --  22   BUN  --  37*  --  36*  --   --  38*   CR  --  2.84* 2.91* 2.90*  --   --  3.03*   ANIONGAP  --  7  --  10  --   --  10   RIP  --  7.7*  --  7.8*  --   --  7.9*   GLC  --  96  --  103*  --   --  115*   TROPI  --   --   --   --  0.016 0.017 <0.015The 99th percentile for upper reference range is 0.045 ug/L.  Troponin values in the range of 0.045 - 0.120 ug/L may be associated with risks of adverse clinical events.       No results found for this or any previous visit (from the past 24 hour(s)).    Alexi Spangler MD  Pager [phone]              Progress Notes by Dayna Lewis RN at 1/12/2017 10:53 AM     Author:  Dayna Lewis RN Service:  (none) Author Type:      Filed:  1/12/2017 11:07 AM Note Time:  1/12/2017 10:53 AM Status:  Addendum    :  Dayna Lewis RN ()      Related Notes: Original Note by Dayna Lewis RN () filed at 1/12/2017 11:06 AM         Care Transition Initial Assessment - RN        Met with: Patient.    DATA   Active Problems:    Cellulitis of right leg       Cognitive Status: awake, alert and oriented.        Contact information and PCP information verified: Yes    Lives With: alone                      Insurance concerns: Wants to know if TCU covered    ASSESSMENT  Patient currently receives the following services: none     Identified issues/concerns regarding health management: Pt. Concerned about lymphedema wraps. Has no one at home to help her with this. Teary eyed. Discussed advantage of going to TCU, pt. Is now agreeable to this. Was concerned about cost. Informed that this is covered by medicare.   Will talk with juan manuel Bui about TCU at [place].  SW notified.  PLAN  Patient anticipates discharging to ?TCU      Patient anticipates needs for home equipment: Walker, lymphedema  wraps    Plan/Disposition: Pt. More agreeable may need TCU. Wants us to talk with her son about this.  Appointments:        Care  (CTS) will continue to follow as needed.           Progress Notes by Alexi Spangler MD at 1/11/2017  2:03 PM     Author:  Alexi Spangler MD Service:  Hospitalist Author Type:  Physician    Filed:  1/11/2017  2:05 PM Note Time:  1/11/2017  2:03 PM Status:  Signed    :  Alexi Spangler MD (Physician)           [facility]  Hospitalist Progress Note  Alexi Spangler MD  01/11/2017    Assessment and Plan  ASSESSMENT AND PLAN:  This is a 89-year-old woman who presents with increased swelling, redness and itching of her right lower extremity.    1.  RLE cellulitis  - U/S negative for DVT  - continue IV ancef for another 24 hrs and then to keflex at discharge  - ace wraps  - lymphedema consult appreciated  - decrease in edema  2. Hypertension.    - continue metoprolol but increase hydralazine from 25 to 50 mg tid  - discontinued amlodipine due to LE edema  -  hold losartan given her acute kidney injury.    3. LE edema.  - discontinue norvasc  - Echo shows hyperdynamic LVEF with moderate pulmonary HTN and early grade I diastolic dysfunction  -She has T-wave inversion isolated in lead III, but no chest pain and troponin negative.   - her elevated BNP is likely chronically elevated in context of CKD, moderate pulmonary HTN.        4.  Code status:  Discussed with the patient and she is full code.    5.  Deep venous thrombosis prophylaxis:  Start heparin subcu.    6.  Disposition:   - PT/OT eval  - lymphedema consult  - home tomorrow.        Interval History  - chart reviewed, echo reviewed  - decreased redness    -Data reviewed today: I reviewed all new labs and imaging over the last 24 hours. I personally reviewed no images or EKG's today.    Physical Exam   , Blood pressure 147/62, pulse 68, temperature 98.3  F (36.8  C), temperature  "source Oral, resp. rate 18, height 1.651 m (5' 5\"), weight 65.7 kg (144 lb 13.5 oz), SpO2 97 %.  Filed Vitals:    01/09/17 0943 01/10/17 0500 01/11/17 0503   Weight: 65.318 kg (144 lb) 65.2 kg (143 lb 11.8 oz) 65.7 kg (144 lb 13.5 oz)     Vital Signs with Ranges  Temp:  [98  F (36.7  C)-98.3  F (36.8  C)] 98.3  F (36.8  C)  Pulse:  [59-68] 68  Resp:  [18] 18  BP: (136-190)/(62-89) 147/62 mmHg  SpO2:  [96 %-97 %] 97 %  I/O's Last 24 hours  I/O last 3 completed shifts:  In: 1450 [P.O.:960; I.V.:490]  Out: 500 [Urine:500]    Constitutional: Awake, alert, cooperative, no apparent distress  Respiratory: Clear to auscultation bilaterally, no crackles or wheezing  Cardiovascular: Regular rate and rhythm, normal S1 and S2, and no murmur noted  GI: Normal bowel sounds, soft, non-distended, non-tender  Skin/Integumen: No rashes, no cyanosis, +  edema, decreased erythema   Other:      Medications  All medications were reviewed.       hydrALAZINE  50 mg Oral TID     metoprolol  50 mg Oral BID     heparin  5,000 Units Subcutaneous Q12H     ceFAZolin  1 g Intravenous Q12H        Data    Recent Labs  Lab 01/11/17  0726 01/10/17  2200 01/10/17  0725 01/10/17  0012 01/09/17  1843 01/09/17  1053   WBC 7.0  --   --   --   --  10.0   HGB 9.5*  --   --   --   --  10.5*   MCV 92  --   --   --   --  90     --  214  --   --  220    141 143  --   --  137   POTASSIUM 4.3  --  4.4  --   --  4.1   CHLORIDE 111*  --  111*  --   --  105   CO2 23  --  22  --   --  22   BUN 37*  --  36*  --   --  38*   CR 2.84* 2.91* 2.90*  --   --  3.03*   ANIONGAP 7  --  10  --   --  10   RIP 7.7*  --  7.8*  --   --  7.9*   GLC 96  --  103*  --   --  115*   TROPI  --   --   --  0.016 0.017 <0.015The 99th percentile for upper reference range is 0.045 ug/L.  Troponin values in the range of 0.045 - 0.120 ug/L may be associated with risks of adverse clinical events.       No results found for this or any previous visit (from the past 24 " hour(s)).    Alexi Spangler MD  Pager [phone]              Progress Notes by Vaibhav Fuentes PT at 1/10/2017  3:17 PM     Author:  Vaibhav Fuentes PT Service:  (none) Author Type:  Physical Therapist    Filed:  1/10/2017  3:17 PM Note Time:  1/10/2017  3:17 PM Status:  Signed    :  Vaibhav Fuentes PT (Physical Therapist)            01/10/17 1400   Rehab Discipline   Discipline PT   Type of Visit   Type of visit Initial Edema Evaluation   General Information   Start of care 01/10/17   Referring physician Chris Short MD   Orders Evaluate and treat as indicated   Order date 01/09/17   Medical diagnosis Cellulitis   Onset of illness / date of surgery 01/09/17   Edema onset 01/01/17   Affected body parts RLE;LLE   Edema etiology Infection   Edema etiology comments Pt has had feet swelling for years, but in the past 1-2 weeks had noted itchy LEs. Now presents with cellulitis and redness in R LE, swelling in B LE below the knee.    Pertinent history of current problem (PT: include personal factors and/or comorbidities that impact the POC; OT: include additional occupational profile info) moderat   Surgical / medical history reviewed Yes   Prior level of functional mobility Independent without device.    Community support Family / friend caregiver   Patient role / employment history Retired   Living environment Apartment / condo   Living environment comments Senior apartment, lives on 10th floor, with elevator to access. Pt uses rail when available.    Assistive device comments Uses rail if available, shopping cart in community if in a store.    General observations Pt is easily awakened.    Fall Screening   Fall screen completed by PT   Per patient, fall 2 or more times in past year? No   Per patient, fall with injury in past year? No   Is patient a fall risk? Yes   Fall screen comments Unable to perform timed up and go test. Pt unsteady with gait currently, needs rail in [place] to stabilize at times, 2 LOB  during walk.    Subjective Report   Patient report of symptoms Pt reports her son may be able to wrap LEs daily or every other day. Reports she wants to take baths, does not like showers. Discussed that it not be safe for the patient   Precautions   Precautions Cardiac Edema/CHF;Acute Infection   Patient / Family Goals   Patient / family goals statement Return home   Pain   Patient currently in pain No   Pain comments Denies pain in LEs at rest and with movement.    Vital Signs   Vital signs BP   /89  (after walk, 150/79 after resting 3 min)   Cognitive Status   Orientation Orientation to person, place and time   Level of consciousness Alert   Follows commands and answers questions 100% of the time   Personal safety and judgement Impaired   Memory Intact   Cognitive status comments Pt appears to be oriented but lacks insight into current deficits, precautions with infection.    Edema Exam / Assessment   Skin condition Pitting;Non-pitting;Dryness   Skin condition comments Redness noted R LE   Pitting 2+   Pitting location below the knee, R>L   Capillary refill Symmetrical   Dorsal pedal pulse Symmetrical   Stemmer sign Positive   Ulceration comments multiple scabs from scratching    Range of Motion   ROM No deficits were identified   Strength   Strength No deficits were identified   Strength comments at baseline with MMT, functionally weak though   Posture   Posture comments flexed posture in standing   Activities of Daily Living   Activities of Daily Living Min A for safety with OOB mobility.    Bed Mobility   Bed mobility SBA supine to sit with rail.    Transfers   Transfers CGA for sit to stand.    Gait / Locomotion   Gait / Locomotion Min A, 2 LOB, walks too fast, increased lateral sway.    Planned Edema Interventions   Planned edema interventions Gradient compression bandaging;Exercises;Precautions to prevent infection / exacerbation;Education;Manual therapy   Planned edema intervention comments gait  training; strengthening; transfer   Clinical Impression   Criteria for skilled therapeutic intervention met Yes   Therapy diagnosis Stage 2 Lymphedema; difficulty with gait.    Influenced by the following impairments / conditions Stage 2   Functional limitations due to impairments / conditions Pt needs Min A for OOB mobility.    Clinical Presentation Stable/Uncomplicated   Clinical Presentation Rationale Difficulty with swelling, cellulitis, anxiety for return home and balance/strength issues.    Clinical Decision Making (Complexity) Low complexity   Treatment frequency Daily   Treatment duration 4 days   Patient / family and/or staff in agreement with plan of care Yes   Risks and benefits of therapy have been explained Yes   Education Assessment   Preferred learning style Demonstration   Total Evaluation Time   Total evaluation time 15          Progress Notes by Alexi Spangler MD at 1/10/2017  1:05 PM     Author:  Alexi Spangler MD Service:  Hospitalist Author Type:  Physician    Filed:  1/10/2017  1:11 PM Note Time:  1/10/2017  1:05 PM Status:  Signed    :  Alexi Spangler MD (Physician)           [facility]  Hospitalist Progress Note  Alexi Spangler MD  01/10/2017    Assessment and Plan  ASSESSMENT AND PLAN:  This is a 89-year-old woman who presents with increased swelling, redness and itching of her right lower extremity.    1.  RLE cellulitis  - U/S negative for DVT  - continue IV ancef, to keflex at discharge  - ace wraps  - lymphedema consult  2. Hypertension.    - change from atenolol to metoprolol and increase dosage.  - discontinue amlodipine due to LE edema  -  hold losartan given her acute kidney injury.    3. LE edema.  - discontinue norvasc  - Echo shows hyperdynamic LVEF with moderate pulmonary HTN and early grade I diastolic dysfunction  -She has T-wave inversion isolated in lead III, but no chest pain and troponin negative.   - her elevated BNP is likely  "chronically elevated in context of CKD, moderate pulmonary HTN.        4.  Code status:  Discussed with the patient and she is full code.    5.  Deep venous thrombosis prophylaxis:  Start heparin subcu.    6.  Disposition:   - PT/OT eval  - lymphedema consult  - in 1-2 days.        Interval History  - chart reviewed, echo reviewed  - decreased redness    -Data reviewed today: I reviewed all new labs and imaging over the last 24 hours. I personally reviewed no images or EKG's today.    Physical Exam   , Blood pressure 167/70, pulse 70, temperature 98.7  F (37.1  C), temperature source Oral, resp. rate 18, height 1.651 m (5' 5\"), weight 65.2 kg (143 lb 11.8 oz), SpO2 94 %.  Filed Vitals:    01/09/17 0943 01/10/17 0500   Weight: 65.318 kg (144 lb) 65.2 kg (143 lb 11.8 oz)     Vital Signs with Ranges  Temp:  [97.5  F (36.4  C)-98.7  F (37.1  C)] 98.7  F (37.1  C)  Pulse:  [63-74] 70  Resp:  [14-18] 18  BP: (156-198)/(63-79) 167/70 mmHg  SpO2:  [94 %-100 %] 94 %  I/O's Last 24 hours       Constitutional: Awake, alert, cooperative, no apparent distress  Respiratory: Clear to auscultation bilaterally, no crackles or wheezing  Cardiovascular: Regular rate and rhythm, normal S1 and S2, and no murmur noted  GI: Normal bowel sounds, soft, non-distended, non-tender  Skin/Integumen: No rashes, no cyanosis, +++ edema, erythema   Other:      Medications  All medications were reviewed.       amLODIPine  10 mg Oral Daily     atenolol  50 mg Oral Daily     heparin  5,000 Units Subcutaneous Q12H     ceFAZolin  1 g Intravenous Q12H        Data    Recent Labs  Lab 01/10/17  0725 01/10/17  0012 01/09/17  1843 01/09/17  1053   WBC  --   --   --  10.0   HGB  --   --   --  10.5*   MCV  --   --   --  90     --   --  220     --   --  137   POTASSIUM 4.4  --   --  4.1   CHLORIDE 111*  --   --  105   CO2 22  --   --  22   BUN 36*  --   --  38*   CR 2.90*  --   --  3.03*   ANIONGAP 10  --   --  10   RIP 7.8*  --   --  7.9*   GLC " 103*  --   --  115*   TROPI  --  0.016 0.017 <0.015The 99th percentile for upper reference range is 0.045 ug/L.  Troponin values in the range of 0.045 - 0.120 ug/L may be associated with risks of adverse clinical events.       No results found for this or any previous visit (from the past 24 hour(s)).    Alexi Spangler MD  Pager [phone]              Progress Notes by Estephania Goldberg RN at 1/9/2017  3:26 PM     Author:  Estephania Goldberg RN Service:  (none) Author Type:  Registered Nurse    Filed:  1/9/2017  3:28 PM Note Time:  1/9/2017  3:26 PM Status:  Signed    :  Estephania Goldberg RN (Registered Nurse)           Pt arrived to floor 1430. Pt A & O x 4. IV infusing. Up with SBA. Low NA/Low fat diet. Edema, redness to R leg.Tele monitored. Will continue to monitor.       ED Provider Notes by Jennifer Charles MD at 1/9/2017 10:20 AM     Author:  Jennifer Charles MD Service:  (none) Author Type:  Physician    Filed:  1/9/2017  2:28 PM Note Time:  1/9/2017 10:20 AM Status:  Signed    :  Jennifer Charles MD (Physician)             History     Chief Complaint:  Leg swelling    HPI   Magy LATANYA Askew is a 89 year old female, with a history of hypertension, chronic leg edema, and CKD stage 3, who presents with leg swelling. The patient reports that beginning about 2-3 weeks ago her legs were noticed as edematous and progressively worsening erythematous and irritation. This has only worsened since the onset, prompting her to come here for evaluation. She notes some minor shortness of breath with exertion. She denies an abdominal pain, diarrhea, gait problems, nausea, vomiting, or fever.    Allergies:  Dust mites  Influenza A, H1n1 - nausea and vomiting and hives and syncope  Strawberry  Terazosin - hives     Medications:    Roxicodone  Cozaar  Deltasone  Tenormin  Norvasc     Past Medical History:    Hypertension  CKD stage 3    Past Surgical History:    History reviewed. No pertinent  "surgical history.     Family History:    CAD     Social History:  Smoking status: never  Alcohol use: no  Marital Status:       Review of Systems   Constitutional: Negative for fever.   Respiratory: Positive for shortness of breath.    Cardiovascular: Positive for leg swelling.   Gastrointestinal: Negative for nausea, vomiting, abdominal pain and diarrhea.   Musculoskeletal: Negative for gait problem.   Skin: Positive for color change (erythematous leg bilaterally) and rash.   All other systems reviewed and are negative.      Physical Exam     Patient Vitals for the past 24 hrs:   BP Temp Temp src Pulse Resp SpO2 Height Weight   01/09/17 1249 196/81 mmHg - - 61 - - - -   01/09/17 1247 199/83 mmHg - - 64 18 100 % - -   01/09/17 0943 186/75 mmHg 98.3  F (36.8  C) Oral 69 16 98 % 1.651 m (5' 5\") 65.318 kg (144 lb)        Physical Exam  Nursing note and vitals reviewed.  Constitutional:  Appears well-developed and well-nourished, comfortable.   HENT:   Head:   No evidence of facial or scalp trauma.  Nose:    Nose normal.   Mouth/Throat:  Mucosa is moist.  Eyes:    Conjunctivae are normal.      Pupils are equal, round, and reactive to light.      Right eye exhibits no discharge. Left eye exhibits no discharge.      No scleral icterus.   Cardiovascular:  Normal rate, regular rhythm.      Normal heart sounds and intact distal pulses.       No murmur heard.  Pulmonary/Chest:  Effort normal and breath sounds normal. No respiratory distress.     No wheezes. No rales. No chest wall tenderness. No stridor.   Abdominal:   Soft. No distension and no mass. No tenderness.      No rebound and no guarding. No flank pain.  Musculoskeletal:  Normal range of motion.      no tenderness.                                       Neck supple, no midline cervical tenderness.      Bilateral leg edema. 3+ pitting edema to right knee. 3 wounds that are scabbed    over on anterior skin on right shin. There is slight increase in warmth of " right shin    skin, but the same as the left shin.     2+ pitting edema to left knee. Slight hyperemia to medial left lower leg.  Neurological:   Alert and oriented to person, place, and year.      No cranial nerve deficit.      Exhibits normal muscle tone. Coordination normal.      GCS eye subscore is 4. GCS verbal subscore is 5.      GCS motor subscore is 6.   Skin:    Skin is warm and dry. No rash noted. No diaphoresis.      No erythema. No pallor.   Psychiatric:   Behavior is normal. Judgment and thought content normal.       Emergency Department Course   ECG (11:03:34):  Rate 57 bpm. VT interval 218. QRS duration 84. QT/QTc 426/414. P-R-T axes 5 61 -8.   Sinus bradycardia with 1st degree AV block.   Abnormal QRS-T angle, consider primary T wave abnormality.   Abnormal ECG.   T wave inversion at lead III NEW.   Changes noted above from prior EKG dated 11/30/16.  Interpreted at 1110 by Jennifer Charles MD.    Imaging:  Radiographic findings were communicated with the patient who voiced understanding of the findings.    Chest XR, PA & LAT  IMPRESSION:  Heart size probably upper limits of normal. Lungs are  clear. Pulmonary vasculature within normal limits. Mild aortic  calcification.   As read by Radiology.    US lower extremity venous duplex right  IMPRESSION:   1. There is no evidence of deep venous thrombosis in the right lower  extremity.   2. There is a 4.7 cm Baker's cyst.  As read by Radiology.    Laboratory:  CBC: HGB 10.5 (L), o/w WNL (WBC 10.0, )   BMP: Glucose 115 (H), BUN 38 (H), Creatinine 3.03 (H), GFR estimate 14 (L), Calcium 7.9 (L), o/w WNL  1053 Troponin: <0.015  BNP: 8137 (H)  1053 Troponin: <0.015    Interventions:  1313 - Ancef 1 g IV    Emergency Department Course:  Past medical records, nursing notes, and vitals reviewed.  1020: I performed an exam of the patient and obtained history, as documented above.  IV inserted and blood drawn.  The patient was sent for a X-ray and  ultrasound while in the emergency department, findings above.  1305: I discussed the case with Dr. Short regarding the patient.  Findings and plan explained to the Patient who consents to admission.   1310: Discussed the patient with Dr. Short, who will admit the patient to a medical bed for further monitoring, evaluation, and treatment.       Impression & Plan      Medical Decision Making:  Magy Askew is a 89 year old female who presents for evaluation of leg swelling. She has been scratching at the leg. It has now gotten more red, but no increased pain and no fevers. She does not have any adenopathy in her right groin. The right leg is definitely more edematous than the left, so I did get an ultrasound which was negative other than a Baker's cyst. She does have an elevated BNP of 8137, but troponin is normal and EKG does not show any ischemic change. She has denied any chest pain, shortness of breath maybe a little bit more recently but certainly not profound. She has chronic renal failure that has actually gotten a little worse with the GFR now at 14, her creatinine is 3.03. Normal electrolytes, CBC is normal other than a hemoglobin at 10.5 which is stable. Chest X-ray does not reveal any acute findings. With the patient's increased swelling, renal failure, the fact that she has early cellulitis from digging at her leg, I am going to start her on antibiotics Ancef 1 g IV was given. Further dosing will be determined by pharmacy because of her renal failure. With the worsening renal failure and edema, she will likely need a cardiac echo and a diuresis may be difficult. Her blood pressure was quite high, although she runs high blood pressures, although it was 196 and persistent up there so I gave her 10 of Labetalol IV. I talked to Dr. Short who will be admitting the patient.     Disposition:   Admit to Dr. Short, IV antibiotics dosing per pharmacy, monitor renal function. Consider cardiac echo and blood pressure  management with medication.     Diagnosis:    ICD-10-CM    1. Cellulitis of right lower extremity L03.115    2. Bilateral leg edema R60.0     R>L   3. CRF (chronic renal failure), unspecified stage N18.9    4. Congestive heart failure, unspecified congestive heart failure chronicity, unspecified congestive heart failure type (H) I50.9        Disposition:  Admitted to Dr. Short.    Discharge Medications:  New Prescriptions    No medications on file         Octavio Edouard  1/9/2017    EMERGENCY DEPARTMENT    I, Octavio Edouard am serving as a scribe at 10:20 AM on 1/9/2017 to document services personally performed by Jennifer Charles MD based on my observations and the provider's statements to me.       Jennifer Charles MD  01/09/17 1428       ED Notes by Casi Pimentel RN at 1/9/2017  1:20 PM     Author:  Casi Pimentel RN Service:  (none) Author Type:  Registered Nurse    Filed:  1/9/2017  1:27 PM Note Time:  1/9/2017  1:20 PM Status:  Addendum    :  Casi Pimentel RN (Registered Nurse)      Related Notes: Original Note by Casi Pimentel RN (Registered Nurse) filed at 1/9/2017  1:27 PM         [facility]  ED Nurse Handoff Report    ED Chief complaint: Leg Swelling      ED Diagnosis:   Final diagnoses:   Cellulitis of right lower extremity   Bilateral leg edema - R>L   CRF (chronic renal failure), unspecified stage   Congestive heart failure, unspecified congestive heart failure chronicity, unspecified congestive heart failure type (H)       Code Status: see epic    Allergies:   Allergies   Allergen Reactions     Dust Mites      Influenza A, H1n1 Nausea and Vomiting     With hives and past out     Strawberry      Terazosin Hives       Activity level:  Stand with Assist     Needed?: No    Isolation: No  Infection: Not Applicable    Bariatric?: No      Vital Signs:   Filed Vitals:    01/09/17 0943 01/09/17 1247 01/09/17 1249   BP: 186/75  "199/83 196/81   Pulse: 69 64 61   Temp: 98.3  F (36.8  C)     TempSrc: Oral     Resp: 16 18    Height: 1.651 m (5' 5\")     Weight: 65.318 kg (144 lb)     SpO2: 98% 100%        Cardiac Rhythm: ,        Pain level: 0-10 Pain Scale: 0    Is this patient confused?: No    Patient Report: Initial Complaint: c/o swelling and itching to LEs  Focused Assessment: bilateral LE swelling and redness, R> L. Open scratches to RLE with weeping. AOX4. Denies any SOB or pain, lungs are diminished but clear. BP^ but pt runs 160-180 SBP.  Tests Performed: labs, US, cxr  Abnormal Results: CHF, CKD--slightly worse than normal; US negative for DVT, probable cellulitis to LE  Treatments provided: NS at 125/hr, ancef IV; will 1 time dose iv BP meds prior to transfer     Family Comments: son at bedside. Pt is very sweet, lives independently in senior building and is very active. She is tearful at the idea of being sick and being admitted, states she's never been sick before.     OBS brochure/video discussed/provided to patient: No    ED Medications:   Medications   ceFAZolin (ANCEF) 1 g vial to attach to  ml bag for ADULT or 50 ml bag for PEDS (1 g Intravenous New Bag 1/9/17 1313)   0.9% sodium chloride infusion (not administered)       Drips infusing?:  Yes, NS    ED NURSE PHONE NUMBER: [phone]                      Procedure Notes     No notes of this type exist for this encounter.         Progress Notes - Therapies (Notes from 01/09/17 through 01/12/17)      Progress Notes by Vaibhav Fuentes PT at 1/10/2017  3:17 PM     Author:  Vaibhav Fuentes PT Service:  (none) Author Type:  Physical Therapist    Filed:  1/10/2017  3:17 PM Note Time:  1/10/2017  3:17 PM Status:  Signed    :  Vaibhav Fuentes PT (Physical Therapist)            01/10/17 1400   Rehab Discipline   Discipline PT   Type of Visit   Type of visit Initial Edema Evaluation   General Information   Start of care 01/10/17   Referring physician Chris Short MD "   Orders Evaluate and treat as indicated   Order date 01/09/17   Medical diagnosis Cellulitis   Onset of illness / date of surgery 01/09/17   Edema onset 01/01/17   Affected body parts RLE;LLE   Edema etiology Infection   Edema etiology comments Pt has had feet swelling for years, but in the past 1-2 weeks had noted itchy LEs. Now presents with cellulitis and redness in R LE, swelling in B LE below the knee.    Pertinent history of current problem (PT: include personal factors and/or comorbidities that impact the POC; OT: include additional occupational profile info) moderat   Surgical / medical history reviewed Yes   Prior level of functional mobility Independent without device.    Community support Family / friend caregiver   Patient role / employment history Retired   Living environment Apartment / condo   Living environment comments Senior apartment, lives on 10th floor, with elevator to access. Pt uses rail when available.    Assistive device comments Uses rail if available, shopping cart in community if in a store.    General observations Pt is easily awakened.    Fall Screening   Fall screen completed by PT   Per patient, fall 2 or more times in past year? No   Per patient, fall with injury in past year? No   Is patient a fall risk? Yes   Fall screen comments Unable to perform timed up and go test. Pt unsteady with gait currently, needs rail in vazquez to stabilize at times, 2 LOB during walk.    Subjective Report   Patient report of symptoms Pt reports her son may be able to wrap LEs daily or every other day. Reports she wants to take baths, does not like showers. Discussed that it not be safe for the patient   Precautions   Precautions Cardiac Edema/CHF;Acute Infection   Patient / Family Goals   Patient / family goals statement Return home   Pain   Patient currently in pain No   Pain comments Denies pain in LEs at rest and with movement.    Vital Signs   Vital signs BP   /89  (after walk, 150/79 after  resting 3 min)   Cognitive Status   Orientation Orientation to person, place and time   Level of consciousness Alert   Follows commands and answers questions 100% of the time   Personal safety and judgement Impaired   Memory Intact   Cognitive status comments Pt appears to be oriented but lacks insight into current deficits, precautions with infection.    Edema Exam / Assessment   Skin condition Pitting;Non-pitting;Dryness   Skin condition comments Redness noted R LE   Pitting 2+   Pitting location below the knee, R>L   Capillary refill Symmetrical   Dorsal pedal pulse Symmetrical   Stemmer sign Positive   Ulceration comments multiple scabs from scratching    Range of Motion   ROM No deficits were identified   Strength   Strength No deficits were identified   Strength comments at baseline with MMT, functionally weak though   Posture   Posture comments flexed posture in standing   Activities of Daily Living   Activities of Daily Living Min A for safety with OOB mobility.    Bed Mobility   Bed mobility SBA supine to sit with rail.    Transfers   Transfers CGA for sit to stand.    Gait / Locomotion   Gait / Locomotion Min A, 2 LOB, walks too fast, increased lateral sway.    Planned Edema Interventions   Planned edema interventions Gradient compression bandaging;Exercises;Precautions to prevent infection / exacerbation;Education;Manual therapy   Planned edema intervention comments gait training; strengthening; transfer   Clinical Impression   Criteria for skilled therapeutic intervention met Yes   Therapy diagnosis Stage 2 Lymphedema; difficulty with gait.    Influenced by the following impairments / conditions Stage 2   Functional limitations due to impairments / conditions Pt needs Min A for OOB mobility.    Clinical Presentation Stable/Uncomplicated   Clinical Presentation Rationale Difficulty with swelling, cellulitis, anxiety for return home and balance/strength issues.    Clinical Decision Making (Complexity) Low  complexity   Treatment frequency Daily   Treatment duration 4 days   Patient / family and/or staff in agreement with plan of care Yes   Risks and benefits of therapy have been explained Yes   Education Assessment   Preferred learning style Demonstration   Total Evaluation Time   Total evaluation time 15

## 2017-01-09 NOTE — IP AVS SNAPSHOT
` `     [place] [address] [facility]: [phone]            Medication Administration Report for Magy Askew H as of 01/12/17 1509   Legend:    Given Hold Not Given Due Canceled Entry Other Actions    Time Time (Time) Time  Time-Action       Inactive    Active    Linked        Medications 01/06/17 01/07/17 01/08/17 01/09/17 01/10/17 01/11/17 01/12/17    acetaminophen (TYLENOL) tablet 650 mg  Dose: 650 mg Freq: EVERY 4 HOURS PRN Route: PO  PRN Reason: mild pain  Start: 01/09/17 1418   Admin Instructions: Alternate ibuprofen (if ordered) with acetaminophen.  Maximum acetaminophen dose from all sources = 75 mg/kg/day not to exceed 4 grams/day.               bisacodyl (DULCOLAX) Suppository 10 mg  Dose: 10 mg Freq: DAILY PRN Route: RE  PRN Reason: constipation  Start: 01/09/17 1418   Admin Instructions: Hold for loose stools.  This is the third step of a three step constipation treatment protocol.               diphenhydrAMINE (BENADRYL) capsule 25 mg  Dose: 25 mg Freq: EVERY 6 HOURS PRN Route: PO  PRN Reason: itching  Start: 01/09/17 1418       1439 (25 mg)-Given       2205 (25 mg)-Given         1015 (25 mg)-Given            heparin sodium PF injection 5,000 Units  Dose: 5,000 Units Freq: EVERY 12 HOURS Route: SC  Start: 01/09/17 1800   Admin Instructions: HOLD heparin IF platelet count falls below 50% baseline or less than 100,000 /  L and notify provider. Use this product If CrCl less than 30 mL/min.        2035 (5,000 Units)-Given        0607 (5,000 Units)-Given       1948 (5,000 Units)-Given        0543 (5,000 Units)-Given       1824 (5,000 Units)-Given        0628 (5,000 Units)-Given       [ ] 1800           hydrALAZINE (APRESOLINE) injection 10 mg  Dose: 10 mg Freq: EVERY 4 HOURS PRN Route: IV  PRN Reason: high blood pressure  PRN Comment: give for SBP > 180  Start: 01/09/17 2006       2035 (10 mg)-Given        0111 (10 mg)-Given        1117 (10 mg)-Given        0050 (10 mg)-Given            hydrALAZINE (APRESOLINE) tablet 50 mg  Dose: 50 mg Freq: 3 TIMES DAILY Route: PO  Start: 01/11/17 1600   Admin Instructions: Hold for SBP < 100          1614 (50 mg)-Given       2128 (50 mg)-Given        0802 (50 mg)-Given       [ ] 1600       [ ] 2200           naloxone (NARCAN) injection 0.1-0.4 mg  Dose: 0.1-0.4 mg Freq: EVERY 2 MIN PRN Route: IV  PRN Reason: opioid reversal  Start: 01/09/17 1418   Admin Instructions: For respiratory rate LESS than or EQUAL to 8.  Partial reversal dose:  0.1 mg titrated q 2 minutes for Analgesia Side Effects Monitoring Sedation Level of 3 (frequently drowsy, arousable, drifts to sleep during conversation).Full reversal dose:  0.4 mg bolus for Analgesia Side Effects Monitoring Sedation Level of 4 (somnolent, minimal or no response to stimulation).               ondansetron (ZOFRAN-ODT) ODT tab 4 mg  Dose: 4 mg Freq: EVERY 6 HOURS PRN Route: PO  PRN Reason: nausea  Start: 01/09/17 1418   Admin Instructions: This is Step 1 of nausea and vomiting management.  If nausea not resolved in 15 minutes, go to Step 2 prochlorperazine (COMPAZINE). Do not push through foil backing. Peel back foil and gently remove. Place on tongue immediately. Administration with liquid unnecessary              Or  ondansetron (ZOFRAN) injection 4 mg  Dose: 4 mg Freq: EVERY 6 HOURS PRN Route: IV  PRN Reasons: nausea,vomiting  Start: 01/09/17 1418   Admin Instructions: This is Step 1 of nausea and vomiting management.  If nausea not resolved in 15 minutes, go to Step 2 prochlorperazine (COMPAZINE).              Future Medications  Medications 01/06/17 01/07/17 01/08/17 01/09/17 01/10/17 01/11/17 01/12/17       cephALEXin (KEFLEX) capsule 500 mg  Dose: 500 mg Freq: EVERY 12 HOURS Route: PO  Indications of Use: SKIN AND SOFT TISSUE INFECTION  Start: 01/12/17 2100   End: 01/17/17 2059          [ ] 2100           metoprolol (TOPROL-XL) 24 hr tablet 75 mg  Dose: 75 mg Freq: 2 TIMES DAILY Route: PO  Start: 01/12/17  2100   Admin Instructions: DO NOT CRUSH. Tablet may be split in half along score line.           [ ] 2100          Discontinued Medications  Medications 01/06/17 01/07/17 01/08/17 01/09/17 01/10/17 01/11/17 01/12/17         Rate: 50 mL/hr Freq: CONTINUOUS Route: IV  Start: 01/09/17 1430   End: 01/10/17 1304       1430 ( )-New Bag        1123 ( )-New Bag       1304-Med Discontinued           Dose: 10 mg Freq: DAILY Route: PO  Start: 01/10/17 0900   End: 01/10/17 1312        0915 (10 mg)-Given       1312-Med Discontinued           Dose: 50 mg Freq: DAILY Route: PO  Start: 01/10/17 0900   End: 01/10/17 1312        0915 (50 mg)-Given       1312-Med Discontinued           Dose: 1 g Freq: EVERY 12 HOURS Route: IV  Indications of Use: SKIN AND SOFT TISSUE INFECTION  Last Dose: 1 g (01/10/17 0042)  Start: 01/10/17 0100   End: 01/12/17 1404        0042 (1 g)-New Bag       1254 (1 g)-New Bag        0138 (1 g)-New Bag       1241 (1 g)-New Bag        0042 (1 g)-New Bag       1311 (1 g)-New Bag       1404-Med Discontinued         Dose: 25 mg Freq: 3 TIMES DAILY Route: PO  Start: 01/10/17 1600   End: 01/11/17 1403   Admin Instructions: Hold for SBP < 100         1629 (25 mg)-Given       2115 (25 mg)-Given        0822 (25 mg)-Given       1403-Med Discontinued          Dose: 50 mg Freq: 2 TIMES DAILY Route: PO  Start: 01/11/17 0900   End: 01/12/17 1345   Admin Instructions: DO NOT CRUSH. Tablet may be split in half along score line.          0822 (50 mg)-Given       2128 (50 mg)-Given        0802 (50 mg)-Given       1345-Med Discontinued    Medications 01/06/17 01/07/17 01/08/17 01/09/17 01/10/17 01/11/17 01/12/17

## 2017-01-09 NOTE — H&P
DATE OF ADMISSION:  01/09/2017.       CHIEF COMPLAINT:  Increased itching of her right leg.      HISTORY OF PRESENT ILLNESS:  Magy Askew is a 89-year-old woman who lives alone.  She reportedly has some chronic lower extremity edema, but the son reports that it typically is only in her feet.  For the past 2 weeks she has noticed increasing swelling, redness and discomfort of her right leg, although she does have swelling in both legs.  She began scratching at her right lower extremity because of progressively worsening itching.  Today, she was brought to the Emergency Department at [facility] as redness and swelling of her right lower extremity has become more dramatic.  She primarily was looking for some relief for the itching but being evaluated in the Emergency Department, the ER staff felt that she may have a cellulitis of her lower extremity and started her on Ancef.  She also has evidence of acute kidney injury on top of chronic kidney disease.  She is being admitted to the Hospitalist Medicine Service for further evaluation.      PAST MEDICAL HISTORY:  Hypertension, tremors, cognitive impairment.  Denies any previous surgical history.      MEDICATIONS  PRIOR TO ADMISSION:   1.  Amlodipine 10 mg daily.   2.  Atenolol 50 mg daily.   3.  Cozaar 50 mg daily.      ALLERGIES:  Reported to terazosin.      FAMILY HISTORY:  Brother with lung cancer.      SOCIAL HISTORY:  No tobacco use, no alcohol use.  No drug abuse.  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  Lives alone in an independent living facility.      REVIEW OF SYSTEMS:  A 10-point review completed.  No chest pain, no shortness of breath.  Positive for dyspnea on exertion, no nausea, no vomiting, no abdominal pain, no fevers, no chills, no dysuria, no melena, no hematochezia, no sore throat, reports some chronic coughing a week ago and nonproductive, but states that has resolved.  Right leg has been itching to the  point that she scratches it constantly.  Both legs have increased swelling, but the right side more prominent and more red per her report.  Otherwise, denies all other complaints on 10-point review.      PHYSICAL EXAMINATION:   VITAL SIGNS:  Temperature 98.3, heart rate 61, respiratory rate 18, BP of 196/81, saturating 100% on room air.   GENERAL:  She is awake, alert, in no acute distress.   HEENT:  Extraocular muscles intact.   NECK:  No lymphadenopathy.   LUNGS:  Clear to auscultation bilaterally.   CARDIOVASCULAR:  Regular rate and rhythm, S1, S2.   ABDOMEN:  Bowel sounds positive, nontender, nondistended.   EXTREMITIES:  Right lower extremity shows increased redness and some excoriations and open wounds with weeping.  She has 3+ bilateral lower extremity edema.   NEUROLOGIC:  Cranial nerves II through XII are intact.   SKIN:  As reported on her right lower extremity, there are some weeping wounds, openings and excoriations and visible increased redness compared to the left lower extremity.      LABORATORY DATA:  Troponin less than 0.015, BNP of 8137.  Sodium 137, potassium 4.1, chloride 105, CO2 22, BUN 38, creatinine 3.03, glucose 115.  White blood cell count 10.0, hemoglobin 10.5, hematocrit 31.7, platelet count 220.  Ultrasound of right lower extremity showed no evidence of DVT.  There was a Baker cyst.  Chest x-ray shows heart size upper limit of normal.  Lungs are clear.  Mild aortic calcification.  EKG sinus bradycardia, first-degree AV block and T-wave inversions in lead III.      ASSESSMENT AND PLAN:  This is a 89-year-old woman who presents with increased swelling, redness and itching of her right lower extremity.   1.  Neurologic:  The patient has a reported history of tremors and some mild cognitive decline.  We will have PT, OT assess.   2.  Cardiovascular:  Reported history of hypertension.  Continue atenolol and amlodipine.  We will hold lesser losartan given her acute kidney injury.  Will monitor  patient on tele.  She has T-wave inversion isolated in lead III, will check echocardiogram.  She has an elevated BNP, but no previous history and records mentioning congestive heart failure.  Given her increased lower extremity edema and elevated BNP, she may have congestive heart failure.  Echocardiogram is pending.   3.  Infectious disease:  Right lower extremity cellulitis.  Continue Ancef.  Ultrasound of the lower extremity was negative for deep venous thrombosis.     4.  Lower extremity edema:  We will have lymphedema consult.   5.  Code status:  Discussed with the patient and she is full code.   6.  Deep venous thrombosis prophylaxis:  Start heparin subcu.   7.  Disposition:  The patient is admitted under inpatient status likely greater than a 2-midnight stay.  Patient does live alone.  Will have PT, OT eval.  May need a rehab stay.         GERBER PRICE MD             D: 2017 14:02   T: 2017 14:57   MT: SHASHA      Name:     JARRETT LOPEZ   MRN:      [id number]        Account:      [id number]   :      10/11/1923           Admitted:     [id number]      Document: [id number]       cc: Jeff Lara MD

## 2017-01-10 NOTE — PLAN OF CARE
Problem: Goal Outcome Summary  Goal: Goal Outcome Summary  Outcome: No Change  Pt up with SBA  Walked in hallway x1.  Showered.   No c/o pain.  BLE elevated.  +3 bilateral leg edema.   R leg outlined, red warm.   Appetite good.  No c/o itching.  Iv ancef.  Iv fluids d/c'd.  BP elevated 186/79, improved to 167/70 after morning meds.  Creat 2.90.  BP meds changed to metoprolol.  ? D/c 2 days.

## 2017-01-10 NOTE — PROGRESS NOTES
01/10/17 1400   Rehab Discipline   Discipline PT   Type of Visit   Type of visit Initial Edema Evaluation   General Information   Start of care 01/10/17   Referring physician Chris Short MD   Orders Evaluate and treat as indicated   Order date 01/09/17   Medical diagnosis Cellulitis   Onset of illness / date of surgery 01/09/17   Edema onset 01/01/17   Affected body parts RLE;LLE   Edema etiology Infection   Edema etiology comments Pt has had feet swelling for years, but in the past 1-2 weeks had noted itchy LEs. Now presents with cellulitis and redness in R LE, swelling in B LE below the knee.    Pertinent history of current problem (PT: include personal factors and/or comorbidities that impact the POC; OT: include additional occupational profile info) moderat   Surgical / medical history reviewed Yes   Prior level of functional mobility Independent without device.    Community support Family / friend caregiver   Patient role / employment history Retired   Living environment Apartment / condo   Living environment comments Senior apartment, lives on 10th floor, with elevator to access. Pt uses rail when available.    Assistive device comments Uses rail if available, shopping cart in community if in a store.    General observations Pt is easily awakened.    Fall Screening   Fall screen completed by PT   Per patient, fall 2 or more times in past year? No   Per patient, fall with injury in past year? No   Is patient a fall risk? Yes   Fall screen comments Unable to perform timed up and go test. Pt unsteady with gait currently, needs rail in vazquez to stabilize at times, 2 LOB during walk.    Subjective Report   Patient report of symptoms Pt reports her son may be able to wrap LEs daily or every other day. Reports she wants to take baths, does not like showers. Discussed that it not be safe for the patient   Precautions   Precautions Cardiac Edema/CHF;Acute Infection   Patient / Family Goals   Patient / family goals  statement Return home   Pain   Patient currently in pain No   Pain comments Denies pain in LEs at rest and with movement.    Vital Signs   Vital signs BP   /89  (after walk, 150/79 after resting 3 min)   Cognitive Status   Orientation Orientation to person, place and time   Level of consciousness Alert   Follows commands and answers questions 100% of the time   Personal safety and judgement Impaired   Memory Intact   Cognitive status comments Pt appears to be oriented but lacks insight into current deficits, precautions with infection.    Edema Exam / Assessment   Skin condition Pitting;Non-pitting;Dryness   Skin condition comments Redness noted R LE   Pitting 2+   Pitting location below the knee, R>L   Capillary refill Symmetrical   Dorsal pedal pulse Symmetrical   Stemmer sign Positive   Ulceration comments multiple scabs from scratching    Range of Motion   ROM No deficits were identified   Strength   Strength No deficits were identified   Strength comments at baseline with MMT, functionally weak though   Posture   Posture comments flexed posture in standing   Activities of Daily Living   Activities of Daily Living Min A for safety with OOB mobility.    Bed Mobility   Bed mobility SBA supine to sit with rail.    Transfers   Transfers CGA for sit to stand.    Gait / Locomotion   Gait / Locomotion Min A, 2 LOB, walks too fast, increased lateral sway.    Planned Edema Interventions   Planned edema interventions Gradient compression bandaging;Exercises;Precautions to prevent infection / exacerbation;Education;Manual therapy   Planned edema intervention comments gait training; strengthening; transfer   Clinical Impression   Criteria for skilled therapeutic intervention met Yes   Therapy diagnosis Stage 2 Lymphedema; difficulty with gait.    Influenced by the following impairments / conditions Stage 2   Functional limitations due to impairments / conditions Pt needs Min A for OOB mobility.    Clinical  Presentation Stable/Uncomplicated   Clinical Presentation Rationale Difficulty with swelling, cellulitis, anxiety for return home and balance/strength issues.    Clinical Decision Making (Complexity) Low complexity   Treatment frequency Daily   Treatment duration 4 days   Patient / family and/or staff in agreement with plan of care Yes   Risks and benefits of therapy have been explained Yes   Education Assessment   Preferred learning style Demonstration   Total Evaluation Time   Total evaluation time 15

## 2017-01-10 NOTE — PLAN OF CARE
Problem: Goal Outcome Summary  Goal: Goal Outcome Summary  OT: Discussed patient performance with PT. DC noted to be in 1-2 days and PT is rec TCU if patient declines using a walker for home. OT deferring to next level of care at this time.

## 2017-01-10 NOTE — PLAN OF CARE
Problem: Goal Outcome Summary  Goal: Goal Outcome Summary  Pt alert and oriented x 4; anxious. BP elevated 181/74; administered PRN IV hydralazine; lowered to 164/63. Other VSS. Denied pain, nausea, sob. C/o pruritis; received PRN benadryl. Up with SBA and GB to BR. Voiding adequately. RLE elevated; red and hot in appearance. Outlined red area with marker. Tolerating cardiac diet. Tele SR with 1st degree AVB. BLE edema. Poor renal function and elevated BNP. Nursing will continue to monitor.

## 2017-01-10 NOTE — PLAN OF CARE
Problem: Goal Outcome Summary  Goal: Goal Outcome Summary  Outcome: No Change  Patient alert and oriented x4. Up with SBA and gait belt, pt calls appropriately. SBP in 180's at 0100, PRN hydralazine administered. No complaints of pain this shift. Pt appears to be anxious throughout shift, but denies when asked. Right lower extremity is red and warm to touch. Pt has no complaints of itching this shift. Will continue to monitor.

## 2017-01-10 NOTE — PROGRESS NOTES
"[facility]  Hospitalist Progress Note  Alexi Spangler MD  01/10/2017    Assessment and Plan  ASSESSMENT AND PLAN:  This is a 89-year-old woman who presents with increased swelling, redness and itching of her right lower extremity.    1.  RLE cellulitis  - U/S negative for DVT  - continue IV ancef, to keflex at discharge  - ace wraps  - lymphedema consult  2. Hypertension.    - change from atenolol to metoprolol and increase dosage.  - discontinue amlodipine due to LE edema  -  hold losartan given her acute kidney injury.    3. LE edema.  - discontinue norvasc  - Echo shows hyperdynamic LVEF with moderate pulmonary HTN and early grade I diastolic dysfunction  -She has T-wave inversion isolated in lead III, but no chest pain and troponin negative.   - her elevated BNP is likely chronically elevated in context of CKD, moderate pulmonary HTN.        4.  Code status:  Discussed with the patient and she is full code.    5.  Deep venous thrombosis prophylaxis:  Start heparin subcu.    6.  Disposition:   - PT/OT eval  - lymphedema consult  - in 1-2 days.        Interval History  - chart reviewed, echo reviewed  - decreased redness    -Data reviewed today: I reviewed all new labs and imaging over the last 24 hours. I personally reviewed no images or EKG's today.    Physical Exam   , Blood pressure 167/70, pulse 70, temperature 98.7  F (37.1  C), temperature source Oral, resp. rate 18, height 1.651 m (5' 5\"), weight 65.2 kg (143 lb 11.8 oz), SpO2 94 %.  Filed Vitals:    01/09/17 0943 01/10/17 0500   Weight: 65.318 kg (144 lb) 65.2 kg (143 lb 11.8 oz)     Vital Signs with Ranges  Temp:  [97.5  F (36.4  C)-98.7  F (37.1  C)] 98.7  F (37.1  C)  Pulse:  [63-74] 70  Resp:  [14-18] 18  BP: (156-198)/(63-79) 167/70 mmHg  SpO2:  [94 %-100 %] 94 %  I/O's Last 24 hours       Constitutional: Awake, alert, cooperative, no apparent distress  Respiratory: Clear to auscultation bilaterally, no crackles or " wheezing  Cardiovascular: Regular rate and rhythm, normal S1 and S2, and no murmur noted  GI: Normal bowel sounds, soft, non-distended, non-tender  Skin/Integumen: No rashes, no cyanosis, +++ edema, erythema   Other:      Medications  All medications were reviewed.       amLODIPine  10 mg Oral Daily     atenolol  50 mg Oral Daily     heparin  5,000 Units Subcutaneous Q12H     ceFAZolin  1 g Intravenous Q12H        Data    Recent Labs  Lab 01/10/17  0725 01/10/17  0012 01/09/17  1843 01/09/17  1053   WBC  --   --   --  10.0   HGB  --   --   --  10.5*   MCV  --   --   --  90     --   --  220     --   --  137   POTASSIUM 4.4  --   --  4.1   CHLORIDE 111*  --   --  105   CO2 22  --   --  22   BUN 36*  --   --  38*   CR 2.90*  --   --  3.03*   ANIONGAP 10  --   --  10   RIP 7.8*  --   --  7.9*   *  --   --  115*   TROPI  --  0.016 0.017 <0.015The 99th percentile for upper reference range is 0.045 ug/L.  Troponin values in the range of 0.045 - 0.120 ug/L may be associated with risks of adverse clinical events.       No results found for this or any previous visit (from the past 24 hour(s)).    Alexi Spangler MD  Pager [phone]

## 2017-01-10 NOTE — PLAN OF CARE
Problem: Goal Outcome Summary  Goal: Goal Outcome Summary  OT: Orders rec'd. Attempted however BP systolic 180s. RN to give meds.

## 2017-01-10 NOTE — PLAN OF CARE
Problem: Goal Outcome Summary  Goal: Goal Outcome Summary  Lymphedema therapy/PT: Orders received. Eval completed. The patient is a 89 y/o female who presents with B LE swelling and R LE redness over the past 1-2 weeks. The patient is diagnosed with cellulitis, and began Abx yesterday early afternoon. The patient appears to have stage 2 lymphedema with 2+pitting noted. Per standard of care based on best practice, the pt will not be appropriate for compressive therapy/wraps until after 48 hours of antibiotics. Plan to wrap pt's LEs tomorrow if still appropriate. The patient also was noted to have decreased balance and strength, requiring Min A for safe mobility. Educated the pt on her deficits, and discussed current recommendations with pt and nurse. The patient is appropriate for ongoing PT while here for Lymphedema management as well as mobility. PT recommendations: Use of walker to increase balance and decrease risk for falls, increased family assist, and home PT/Lymphedema therapy vs TCU. The patient is adamantly not open to TCU at this time. Educated that if her balance is not better by tomorrow, a walker would be a fairly easy tool to use for a temporary timeframe to increase safety and stability for return home. The patient is also not open to use of walker or cane at this time.

## 2017-01-11 NOTE — PLAN OF CARE
Problem: Goal Outcome Summary  Goal: Goal Outcome Summary  Outcome: No Change  Pt up with SBA.  No c/o pain.  C/o itching in legs, benadryl given with relief.  Iv ancef.  Appetite good.  BM x1.  Voided x1 missed hat.   BLE edema +3  Pt showered, sween cream applied, ace wraps applied.  Afebrile.  BP elevated.  PRN hydralazine given x1 with improvement.

## 2017-01-11 NOTE — PLAN OF CARE
Problem: Goal Outcome Summary  Goal: Goal Outcome Summary  Outcome: No Change  Care Plan Summary Note: A and O x4. Right leg remains reddened, warm , 2-3+ edema,denies discomfort. Pt.  ACE wraps  in the morning. Creat. remains elevated.   Hospitalitis see UA results from yesterday . Ambulates to the bathroom  Activity Level: SBA  Fall Risk:Yes

## 2017-01-11 NOTE — PLAN OF CARE
Problem: Goal Outcome Summary  Goal: Goal Outcome Summary  PT: Patient presents with 2+ B LE edema. MD present and states redness and swelling is much less today. PT removed ACE bandages and applied lymph wraps toes to knees with appropriate stretch and spacing to allow for gradient compression. Pt required SBA for transfers, min A for gait of 350 ft without AD with pt reaching out for things in the hallway and mildy unsteady moving quickly. Had a conversation about tyring one and pt hesistant but agreeable in the end. Recommend discharge to TCU for lymph and balance training although pt is adamantly refusing. Therefore recommend home with home PT and home lymphedema with use of walker. Pt states she has a friend who can do the wrapping until home edema can start.

## 2017-01-11 NOTE — PROGRESS NOTES
"[facility]  Hospitalist Progress Note  Alexi Spangler MD  01/11/2017    Assessment and Plan  ASSESSMENT AND PLAN:  This is a 89-year-old woman who presents with increased swelling, redness and itching of her right lower extremity.    1.  RLE cellulitis  - U/S negative for DVT  - continue IV ancef for another 24 hrs and then to keflex at discharge  - ace wraps  - lymphedema consult appreciated  - decrease in edema  2. Hypertension.    - continue metoprolol but increase hydralazine from 25 to 50 mg tid  - discontinued amlodipine due to LE edema  -  hold losartan given her acute kidney injury.    3. LE edema.  - discontinue norvasc  - Echo shows hyperdynamic LVEF with moderate pulmonary HTN and early grade I diastolic dysfunction  -She has T-wave inversion isolated in lead III, but no chest pain and troponin negative.   - her elevated BNP is likely chronically elevated in context of CKD, moderate pulmonary HTN.        4.  Code status:  Discussed with the patient and she is full code.    5.  Deep venous thrombosis prophylaxis:  Start heparin subcu.    6.  Disposition:   - PT/OT eval  - lymphedema consult  - home tomorrow.        Interval History  - chart reviewed, echo reviewed  - decreased redness    -Data reviewed today: I reviewed all new labs and imaging over the last 24 hours. I personally reviewed no images or EKG's today.    Physical Exam   , Blood pressure 147/62, pulse 68, temperature 98.3  F (36.8  C), temperature source Oral, resp. rate 18, height 1.651 m (5' 5\"), weight 65.7 kg (144 lb 13.5 oz), SpO2 97 %.  Filed Vitals:    01/09/17 0943 01/10/17 0500 01/11/17 0503   Weight: 65.318 kg (144 lb) 65.2 kg (143 lb 11.8 oz) 65.7 kg (144 lb 13.5 oz)     Vital Signs with Ranges  Temp:  [98  F (36.7  C)-98.3  F (36.8  C)] 98.3  F (36.8  C)  Pulse:  [59-68] 68  Resp:  [18] 18  BP: (136-190)/(62-89) 147/62 mmHg  SpO2:  [96 %-97 %] 97 %  I/O's Last 24 hours  I/O last 3 completed shifts:  In: 1450 " [P.O.:960; I.V.:490]  Out: 500 [Urine:500]    Constitutional: Awake, alert, cooperative, no apparent distress  Respiratory: Clear to auscultation bilaterally, no crackles or wheezing  Cardiovascular: Regular rate and rhythm, normal S1 and S2, and no murmur noted  GI: Normal bowel sounds, soft, non-distended, non-tender  Skin/Integumen: No rashes, no cyanosis, +  edema, decreased erythema   Other:      Medications  All medications were reviewed.       hydrALAZINE  50 mg Oral TID     metoprolol  50 mg Oral BID     heparin  5,000 Units Subcutaneous Q12H     ceFAZolin  1 g Intravenous Q12H        Data    Recent Labs  Lab 01/11/17  0726 01/10/17  2200 01/10/17  0725 01/10/17  0012 01/09/17  1843 01/09/17  1053   WBC 7.0  --   --   --   --  10.0   HGB 9.5*  --   --   --   --  10.5*   MCV 92  --   --   --   --  90     --  214  --   --  220    141 143  --   --  137   POTASSIUM 4.3  --  4.4  --   --  4.1   CHLORIDE 111*  --  111*  --   --  105   CO2 23  --  22  --   --  22   BUN 37*  --  36*  --   --  38*   CR 2.84* 2.91* 2.90*  --   --  3.03*   ANIONGAP 7  --  10  --   --  10   RIP 7.7*  --  7.8*  --   --  7.9*   GLC 96  --  103*  --   --  115*   TROPI  --   --   --  0.016 0.017 <0.015The 99th percentile for upper reference range is 0.045 ug/L.  Troponin values in the range of 0.045 - 0.120 ug/L may be associated with risks of adverse clinical events.       No results found for this or any previous visit (from the past 24 hour(s)).    Alexi Spangler MD  Pager [phone]

## 2017-01-11 NOTE — PLAN OF CARE
Problem: Goal Outcome Summary  Goal: Goal Outcome Summary  Outcome: No Change  Care Plan Summary Note: A and O x4. Right leg remains reddened, warm , 2-3+ edema,denies discomfort. Pt. Refused ACE wraps this evening, states she will allow legs to be wrapped in the morning. Creat. remains elevated.  UA sent, creat and Na+ drawn per orders.   Activity Level: SBA  Fall Risk:Yes

## 2017-01-12 NOTE — PROGRESS NOTES
Care Transition Initial Assessment - RN        Met with: Patient.    DATA   Active Problems:    Cellulitis of right leg       Cognitive Status: awake, alert and oriented.        Contact information and PCP information verified: Yes    Lives With: alone                      Insurance concerns: Wants to know if TCU covered    ASSESSMENT  Patient currently receives the following services: none     Identified issues/concerns regarding health management: Pt. Concerned about lymphedema wraps. Has no one at home to help her with this. Teary eyed. Discussed advantage of going to TCU, pt. Is now agreeable to this. Was concerned about cost. Informed that this is covered by medicare.   Will talk with son Hao about TCU at [place].  SW notified.  PLAN  Patient anticipates discharging to ?TCU      Patient anticipates needs for home equipment: Walker, lymphedema wraps    Plan/Disposition: Pt. More agreeable may need TCU. Wants us to talk with her son about this.  Appointments:        Care  (CTS) will continue to follow as needed.

## 2017-01-12 NOTE — PLAN OF CARE
Problem: Goal Outcome Summary  Goal: Goal Outcome Summary  Outcome: Improving  AO x4. Forgetful. VSS. No c/o pain. Lymphedema wraps on. Discharge to TCU today if place found. Abx transitioned to oral.

## 2017-01-12 NOTE — PLAN OF CARE
Problem: Goal Outcome Summary  Goal: Goal Outcome Summary  Outcome: Improving  BPs high 170s/60s, Scheduled hydralazine and metoprolol administered. 3+edema noted bilateral feet, BLE with lymphedema wraps intact, to be removed at noon tomorrow. Pt refused to ambulate, ambulating to BR. PT recommending use of walker. Tele SR with 1st degree AVB. DC tomorrow to home with homecare.

## 2017-01-12 NOTE — PROGRESS NOTES
KHOA  D:  Met with son and patient today.  Son explains he is retired so he does visit patient two times a week and helps her with homemaking, bill paying and transportation.  Patient lives in an independent apartment at [facility] and prides herself on being independent.    Several discussions occurred regarding TCU vs patient going home with skilled services and son assisting with lymphedema therapy.  Son favored a short term TCU stay.  Writer explained Medicare SNF benefits.  Son preferred a facility in the [facility] or [facility].  None were available.  He chose [facility] which was available and writer confirmed their therapists can provide lymphedema therapy.  Son transported. Orders and PAS were faxed before d/c.  Son given directions to [facility].    PAS-RR    D: Per DHS regulation, [facility] completed and submitted PAS-RR to [place] [employer] Direct Connect via the Senior LinkAge Line.  PAS-RR confirmation # is : [id number]    I: [facility] spoke with pt/son and they are aware a PAS-RR has been submitted.  [facility] reviewed with pt/son that they may be contacted for a follow up appointment within 10 days of hospital discharge if their SNF stay is < 30 days.  Contact information for [facility] LinkAge Line was also provided.    A: Pt/son verbalized understanding.    P: Further questions may be directed to [facility] LinkAge Line at #[phone], option #4 for PAS-RR staff.

## 2017-01-12 NOTE — PROGRESS NOTES
"[facility]  Hospitalist Progress Note  Alexi Spangler MD  01/12/2017    Assessment and Plan  ASSESSMENT AND PLAN:  This is a 89-year-old woman who presents with increased swelling, redness and itching of her right lower extremity.    1.  RLE cellulitis  - U/S negative for DVT  - change to keflex at discharge today  - ace wraps  - lymphedema consult appreciated  - decrease in edema  - to TCU for lymphedema and balance training.  2. Hypertension.    - continue metoprolol but increase hydralazine from 25 to 50 mg tid  - discontinued amlodipine due to LE edema  -  discontinue losartan given her acute kidney injury.    3. LE edema.  - off norvasc  - Echo shows hyperdynamic LVEF with moderate pulmonary HTN and early grade I diastolic dysfunction  -She has T-wave inversion isolated in lead III, but no chest pain and troponin negative.   - her elevated BNP is likely chronically elevated in context of CKD, moderate pulmonary HTN.        4. CKD  - creatinine improved from 3.03 to 2.84  - discontinue losartan    5.  Deep venous thrombosis prophylaxis:  Start heparin subcu.    6.  Disposition:   - to TCU today      Code status:  Discussed with the patient and she is full code.     Interval History  - chart reviewed, echo reviewed  - decreased redness    -Data reviewed today: I reviewed all new labs and imaging over the last 24 hours. I personally reviewed no images or EKG's today.    Physical Exam  Heart Rate: 69, Blood pressure 156/67, pulse 68, temperature 98.2  F (36.8  C), temperature source Oral, resp. rate 16, height 1.651 m (5' 5\"), weight 67.8 kg (149 lb 7.6 oz), SpO2 98 %.  Filed Vitals:    01/10/17 0500 01/11/17 0503 01/12/17 0633   Weight: 65.2 kg (143 lb 11.8 oz) 65.7 kg (144 lb 13.5 oz) 67.8 kg (149 lb 7.6 oz)     Vital Signs with Ranges  Temp:  [98.2  F (36.8  C)-98.4  F (36.9  C)] 98.2  F (36.8  C)  Pulse:  [65-70] 68  Heart Rate:  [64-70] 69  Resp:  [16-18] 16  BP: (156-182)/(64-79) 156/35 " mmHg  SpO2:  [95 %-98 %] 98 %  I/O's Last 24 hours  I/O last 3 completed shifts:  In: 860 [P.O.:860]  Out: 400 [Urine:400]    Constitutional: Awake, alert, cooperative, no apparent distress  Respiratory: Clear to auscultation bilaterally, no crackles or wheezing  Cardiovascular: Regular rate and rhythm, normal S1 and S2, and no murmur noted  GI: Normal bowel sounds, soft, non-distended, non-tender  Skin/Integumen: No rashes, no cyanosis, +  edema, decreased erythema   Other:      Medications  All medications were reviewed.       metoprolol  75 mg Oral BID     cephalexin  500 mg Oral Q12H     hydrALAZINE  50 mg Oral TID     heparin  5,000 Units Subcutaneous Q12H     ceFAZolin  1 g Intravenous Q12H        Data    Recent Labs  Lab 01/12/17  0740 01/11/17  0726 01/10/17  2200 01/10/17  0725 01/10/17  0012 01/09/17  1843 01/09/17  1053   WBC  --  7.0  --   --   --   --  10.0   HGB  --  9.5*  --   --   --   --  10.5*   MCV  --  92  --   --   --   --  90    216  --  214  --   --  220   NA  --  141 141 143  --   --  137   POTASSIUM  --  4.3  --  4.4  --   --  4.1   CHLORIDE  --  111*  --  111*  --   --  105   CO2  --  23  --  22  --   --  22   BUN  --  37*  --  36*  --   --  38*   CR  --  2.84* 2.91* 2.90*  --   --  3.03*   ANIONGAP  --  7  --  10  --   --  10   RIP  --  7.7*  --  7.8*  --   --  7.9*   GLC  --  96  --  103*  --   --  115*   TROPI  --   --   --   --  0.016 0.017 <0.015The 99th percentile for upper reference range is 0.045 ug/L.  Troponin values in the range of 0.045 - 0.120 ug/L may be associated with risks of adverse clinical events.       No results found for this or any previous visit (from the past 24 hour(s)).    Alexi Spangler MD  Pager [phone]

## 2017-01-12 NOTE — PLAN OF CARE
Problem: Goal Outcome Summary  Goal: Goal Outcome Summary  Outcome: No Change  A/Ox4, forgetful. PRN hydralazine x1, BP recheck under parameters. 3+edema noted bilateral feet, BLE with lymphedema wraps intact, to be removed at noon tomorrow. PT recommending use of walker, pt needs encouragement to use. Tele SR with 1st degree AVB. DC tomorrow to home with homecare.

## 2017-01-12 NOTE — PLAN OF CARE
Problem: Goal Outcome Summary  Goal: Goal Outcome Summary  PT: Patient required SBA for bed mobility, transfers, gait of 250 ft with FWW with improved balance noted with walker vs without. Participated well in strengthening activities. After long discussion, pt in agreement to use a walker but wants a 4ww for home. Pt now agreeable to go to TCU and will get this after rehab stay. Recommend discharge to TCU with lymph therapy.    Pt to discharge today. PT goals not met.    Physical Therapy Discharge Summary    Reason for therapy discharge:    Discharged to transitional care facility.    Progress towards therapy goal(s). See goals on Care Plan in [facility] electronic health record for goal details.  Goals not met.  Barriers to achieving goals:   discharge from facility.    Therapy recommendation(s):    Continued therapy is recommended.  Rationale/Recommendations:  eval and treat for lymph cares and PT.

## 2017-01-13 PROBLEM — I27.20 PULMONARY HYPERTENSION (H): Status: ACTIVE | Noted: 2017-01-01

## 2017-01-13 PROBLEM — F41.9 ANXIETY: Status: ACTIVE | Noted: 2017-01-01

## 2017-01-13 PROBLEM — I87.2 STASIS DERMATITIS OF BOTH LEGS: Status: ACTIVE | Noted: 2017-01-01

## 2017-01-13 NOTE — PROGRESS NOTES
"[facility]      PRIMARY CARE PROVIDER AND CLINIC:  Jeff Lara [facility] [address] / [facility] [address]    Chief Complaint   Patient presents with     Hospital F/U       HPI:    Magy Askew is a 89 year old  (10/11/1923),admitted to the [facility] from [facility].  Hospital stay 1/9/2017 through 1/12/2017.  No discharge summary is available as of this writing.  Pt admitted with bilateral edema, redness on R, treated with IV cefazolin, changed to cephalexin.  At discharge, lymphedema wraps were orders, not performed because per protocol at [facility], active infecton is a contraindication.     Admitted to this facility for  rehab, medical management and nursing care. Current issues are:      Cellulitis of right leg  Pt thinks that her cellulitis was \"from my blood pressure medications\", \"thats what they told me in the hospital\"    Stasis dermatitis of both legs  Pt has long h/o leg swelling per her.    Physical deconditioning  Amb with walker and SBA today.    CKD (chronic kidney disease) stage 4, GFR 15-29 ml/min (H)  GFR < 30 at least since 3/2016.    Essential hypertension, benign  Now on hydralazine instead of CCB    Anxiety  Pt says she's been crying since she was in the hospital, that she is \"homesick.\"  She insists that she should go home, that she will be carefuls so she wont fall.    Gait disturbance  Pt with loss of balance when evaluated in hospital, still needs SBA with ambulation here.      CODE STATUS/ADVANCE DIRECTIVES DISCUSSION:   CPR/Full code   Patient's living condition: lives alone in independent apt.  Her son, Hao, visits several times a week.    ALLERGIES:Dust mites; Influenza a, h1n1; Strawberry; and Terazosin  PAST MEDICAL HISTORY:  has a past medical history of BENIGN HYPERTENSION (3/2/2006); ACP (advance care planning) (11/26/2012); CARDIOVASCULAR SCREENING; LDL GOAL LESS THAN 130 (11/5/2012); and CKD " (chronic kidney disease).  PAST SURGICAL HISTORY:  has no past surgical history on file.  FAMILY HISTORY: family history includes C.A.D. in her daughter and father.  SOCIAL HISTORY:  reports that she has never smoked. She has never used smokeless tobacco. She reports that she does not drink alcohol or use illicit drugs.    Post Discharge Medication Reconciliation Status: discharge medications reconciled and changed, per note/orders (see AVS).  Current Outpatient Prescriptions   Medication Sig Dispense Refill     hydrALAZINE (APRESOLINE) 50 MG tablet Take 1 tablet (50 mg) by mouth 3 times daily 60 tablet 1     metoprolol (TOPROL-XL) 25 MG 24 hr tablet Take 3 tablets (75 mg) by mouth 2 times daily 180 tablet 1     cephALEXin (KEFLEX) 500 MG capsule Take 1 capsule (500 mg) by mouth every 12 hours for 5 days 10 capsule 0       ROS:  Unobtainable secondary to cognitive impairment or aphasia, but today pt reports no pain.  Pt denies any problems, just says she wants to go home.    Exam:  /82 mmHg  Pulse 67  Temp(Src) 98.7  F (37.1  C)  Resp 20  SpO2 97%     BP Readin/78, 194/79, 184/86  GENERAL APPEARANCE:  Alert, in no distress, sitting in WC, appears sad.  ENT:  Mouth and posterior oropharynx normal, moist mucous membranes, hearing acuity normal  EYES:  Conjunctivae, lids, pupils and irises normal  NECK:  No adenopathy,masses or thyromegaly  RESP:  respiratory effort and palpation of chest normal, no respiratory distress, Lung sounds diminished at bases.  CV:  Palpation and auscultation of heart done, rate and rhythm reg, no murmur, no rub or gallop, Edema woody edema to above knees, DP pulses 1+.  M/S:   Gait and station no deformities, ROM grossly normal., Digits and nails normal  SKIN:  Inspection/Palpation of skin and subcutaneous tissue Shallow abrasions R shin.  L LE with mild erythema. R LE with deeper erythema and multiple black outlines, each smaller.  Skin thickened bilateral LE with patchy  thick scale.and mood   NEURO: face symmetrical,  equal  PSYCH:  insight and judgement seem impaired in conversation, memory seems intact , affect flat and mood sad    Phone elizabeth with pt's son, Hao.  He said he had spoken with SW from facility and would be taking his mother home today.    When told that she needed 24 7 supervision with ambulation at least until PT felt she was strong enough to be on her own, he agreed that he would stay with her.  He also agreed that she would have help for bathing to prevent falls.    Lab/Diagnostic data:     WBC   Date Value Ref Range Status   01/11/2017 7.0 4.0 - 11.0 10e9/L Final     HEMOGLOBIN   Date Value Ref Range Status   01/11/2017 9.5* 11.7 - 15.7 g/dL Final   01/09/2017 10.5* 11.7 - 15.7 g/dL Final     Last Basic Metabolic Panel:  NA      141   1/11/2017   POTASSIUM      4.3   1/11/2017  CHLORIDE      111   1/11/2017  ELIZABETH      7.7   1/11/2017  CO2       23   1/11/2017  BUN       37   1/11/2017  CR     2.84   1/11/2017  GLC       96   1/11/2017    WBC      7.0   1/11/2017  RBC     3.20   1/11/2017  HGB      9.5   1/11/2017  HCT     29.4   1/11/2017  MCV       92   1/11/2017  MCH     29.7   1/11/2017  MCHC     32.3   1/11/2017  RDW     13.9   1/11/2017  PLT      274   1/12/2017      ASSESSMENT/PLAN:  (L03.115) Cellulitis of right leg  (primary encounter diagnosis)  Comment: Probably 2/2 stasis dermatitis with scratching and auto inoculation with bacteria.    Pt with no previous plan for compression or elevation or treatment of stasis dermatits.  Plan: Finish Keflex.  Recommend elevation to facilitat healing.    (I83.11,  I83.12) Stasis dermatitis of both legs  Comment: Probably 2/2 dependent edema.  Hypoalbuminemia may also play a role.  Unclear if 2/2 nutritional issues or albuminuria.  Plan: When infection resolved, compression, elevation and emollients to minimize itching and scratching.    (R53.81) Physical deconditioning  Comment: Secondary to hospitalization and  antecedent inactivity.  Pt unwilling to remain in TCU, son adamant that he will take he rhome today.  Plan: recommend continued OT and PT for fall prevention.    (N18.4) CKD (chronic kidney disease) stage 4, GFR 15-29 ml/min (H)  Comment: GFR worse in last year.  Proably hypertensive nephropathy.  Keflex dose is appropriate for GFR.  Plan: Thhough pt has chosen to be full code, some discussion of HCA and getting documentation of that would be useful.    (I10) Essential hypertension, benign  Comment: Primarily systolic HTN with wide pulse pressure.  May be in part 2/2 anxiety.  Avoiding hypotension with fall risk is important.  Hydralazine may also contribute to dependent edema.  Plan: continue current new regimen.  Follow up with Dr Lara with in 7 to 10 d after TCU discharge to adjust    (F41.9) Anxiety  Comment: Clearly this is driving ehr choices about TCU, despite only being here for < 24 hrs.  We have not had sufficient time to do any cognitive testing to assess how much that is contributing to her anxiety.  Plan: Follow up with Dr Lara.    (R26.9) Gait disturbance  Comment: Not ready for indeendent ambulation.  Plan: REcommend continued work with therapy in TCU for balance and endurance for safer discharge home.  IN lieu of that, OK for discharge with understanding william her sonHao will be with her to standby assist with mobility.    Orders:      Information reviewed:  Medications, vital signs, orders, nursing notes, problem list, hospital information. Total time spent with patient visit was 55min including patient visit, review of past records and phone call to patient contact. Greater than 50% of total time spent with counseling and coordinating care.    Electronically signed by:  Annmarie Tamayo MD, MS    Documentation of Face to Face and Certification for Home Health Services    I certify that patient: Magy Askew is under my care and that I, or a nurse practitioner or physician's  assistant working with me, had a face-to-face encounter that meets the physician face-to-face encounter requirements with this patient on: January 13, 2017.    This encounter with the patient was in whole, or in part, for the following medical condition, which is the primary reason for home health care: cellulitis R leg, hypertension, gait disturbance.    I certify that, based on my findings, the following services are medically necessary home health services: Nursing, Occupational Therapy, Physical Therapy and home health aide for bathing.    My clinical findings support the need for the above services because: Nurse is needed: To assess blood pressure and edema after changes in medications or other medical regimen., To provide assessment and oversight required in the home to assure adherence to the medical plan due to: patient anxiety and poor understanding. and To provide caregiver training to assist with: safe ambulation.., Occupational Therapy Services are needed to assess and treat cognitive ability and address ADL safety due to impairment in cognition. and Physical Therapy Services are needed to assess and treat the following functional impairments: gait instability and deconditioning.    Further, I certify that my clinical findings support that this patient is homebound (i.e. absences from home require considerable and taxing effort and are for medical reasons or Congregational services or infrequently or of short duration when for other reasons) because: Requires assistance of another person or specialized equipment to access medical services because patient: Is unable to walk greater than 200 feet without rest. and Requires supervision of another for safe transfer...    Based on the above findings. I certify that this patient is confined to the home and needs intermittent skilled nursing care, physical therapy and/or speech therapy.  The patient is under my care, and I have initiated the establishment of the  plan of care.  This patient will be followed by a physician who will periodically review the plan of care.  Physician/Provider to provide follow up care: Jeff Lara    Attending hospital physician (the Medicare certified [facility] provider): Annmarie Arellano  Physician Signature: See electronic signature associated with these discharge orders.  Date: 1/13/2017

## 2017-01-16 NOTE — TELEPHONE ENCOUNTER
Suggest starting Amlodipine 2.5 mg po qday in addition until seen, script sent to first listed pharmacy as none designated

## 2017-01-17 NOTE — TELEPHONE ENCOUNTER
Karen PT with [employer] ([phone]) calling for orders and re: elevated BP:  1.  BP elevated 1/17/17 during home PT visit,  192/85 when checked twice.  Patient asymptomatic, denies HA, dizziness, balance problems, vision changes.    2.  PT orders: continue PT twice a week x4 wks, once a week x3 wks for strengthening, balance, gait training.  Orders approved per RN Protocol.  SINDY Doll R.N.

## 2017-01-17 NOTE — TELEPHONE ENCOUNTER
Vickie from [facility] called and wanted to let you know patients blood pressure was outside of parameter which was at  195/85 taken at 4:30pm 01/17/2017. Call back number for vickie with any additional questions would be [phone]

## 2017-01-18 NOTE — PROGRESS NOTES
SUBJECTIVE:                                                    Magy Askew is a 89 year old female who presents to clinic today for the following health issues:    Hospital Follow-up Visit:    Hospital/Nursing Home/IP Rehab Facility: [facility]  Date of Admission: 1/09/2017  Date of Discharge: 1/12/2017  Reason(s) for Admission: Cellulitis of the right leg            Problems taking medications regularly:  None       Medication changes since discharge: Yes, changed BP meds to whats current on the med list       Problems adhering to non-medication therapy:  None    Summary of hospitalization:  Discharge summary unavailable  Diagnostic Tests/Treatments reviewed.  Follow up needed: unknown  Other Healthcare Providers Involved in Patient s Care:         None  Update since discharge: stable.     Post Discharge Medication Reconciliation: discharge medications reconciled, continue medications without change.  Plan of care communicated with patient     Coding guidelines for this visit:  Type of Medical   Decision Making Face-to-Face Visit       within 7 Days of discharge Face-to-Face Visit        within 14 days of discharge   Moderate Complexity [id number]   High Complexity [id number]            Patient also has been having high blood pressure. Last reading on Monday was 236/? And yester day was 199/?    Problem list and histories reviewed & adjusted, as indicated.  Additional history: as documented    Patient Active Problem List   Diagnosis     Essential hypertension, benign     CARDIOVASCULAR SCREENING; LDL GOAL LESS THAN 130     ACP (advance care planning)     Myalgia     CKD (chronic kidney disease) stage 4, GFR 15-29 ml/min (H)     Cellulitis of right leg     Anxiety     Pulmonary hypertension (H)     Stasis dermatitis of both legs     History reviewed. No pertinent past surgical history.    Social History   Substance Use Topics     Smoking status: Never Smoker      Smokeless tobacco: Never  Used     Alcohol Use: No     Family History   Problem Relation Age of Onset     C.A.D. Father      C.A.D. Daughter          Current Outpatient Prescriptions   Medication Sig Dispense Refill     amLODIPine (NORVASC) 5 MG tablet Take 1 tablet (5 mg) by mouth daily 90 tablet 1     hydrALAZINE (APRESOLINE) 50 MG tablet Take 1 tablet (50 mg) by mouth 3 times daily 270 tablet 3     order for DME Equipment being ordered: ABBY hose,  1pair 1 Device 0     metoprolol (TOPROL-XL) 25 MG 24 hr tablet Take 3 tablets (75 mg) by mouth 2 times daily 180 tablet 1     [DISCONTINUED] amLODIPine (NORVASC) 2.5 MG tablet Take 1 tablet (2.5 mg) by mouth daily 30 tablet 1     [DISCONTINUED] hydrALAZINE (APRESOLINE) 50 MG tablet Take 1 tablet (50 mg) by mouth 3 times daily 60 tablet 1     Allergies   Allergen Reactions     Dust Mites      Influenza A, H1n1 Nausea and Vomiting     With hives and past out     Strawberry      Terazosin Hives     BP Readings from Last 3 Encounters:   01/18/17 178/80   01/13/17 186/82   01/12/17 156/67    Wt Readings from Last 3 Encounters:   01/18/17 149 lb (67.586 kg)   01/12/17 149 lb 7.6 oz (67.8 kg)   12/08/16 140 lb 8 oz (63.73 kg)            ROS:  C: NEGATIVE for fever, chills, change in weight  E/M: NEGATIVE for ear, mouth and throat problems  R: NEGATIVE for significant cough or SOB  CV: NEGATIVE for chest pain, palpitations with reduced peripheral edema  M: NEGATIVE for significant arthralgias or myalgia  H: NEGATIVE for bleeding problems  P: NEGATIVE for changes in mood or affect    OBJECTIVE:                                                    /80 mmHg  Pulse 55  Temp(Src) 97.8  F (36.6  C) (Oral)  Wt 149 lb (67.586 kg)  SpO2 98%  Body mass index is 24.79 kg/(m^2). Noted also high at home care check this AM, see message reviewed  GENERAL: alert and no distress  EYES: Eyes grossly normal to inspection, extraocular movements - intact, and PERRL  HENT: ear canals- normal; TMs- normal; Nose-  normal; Mouth- no ulcers, no lesions  NECK: no tenderness, no adenopathy, no asymmetry, no masses, no stiffness; thyroid- normal to palpation  RESP: lungs clear to auscultation - no rales, no rhonchi, no wheezes  CV: regular rates and rhythm, normal S1 S2, no S3 or S4 and no click or rub   MS: extremities- no gross deformities noted, noted 1+ edema, gait slowed.  Skin:  right lower extremities with resolving cellulitic changes, mild erythema, appears healing, just completed abx.  PSYCH: Alert; speech- coherent , normal rate and volume; able to articulate logical thoughts, able to abstract reason, no tangential thoughts, no hallucinations, affect- normal         ASSESSMENT/PLAN:                                                    (Z09) Hospital discharge follow-up  (primary encounter diagnosis)  Comment: no d/c summary available for review  Plan: PT as ordered    (L03.115) Cellulitis of right leg  Comment: resolving on abx  Plan: order for DME for ABBY hose, CBC with platelets            (I10) Essential hypertension, benign  Comment: increase Amlodipine to 5mg daily  Plan: amLODIPine (NORVASC) 5 MG tablet, hydrALAZINE         (APRESOLINE) 50 MG tablet keep at 50mg TID as per renal function, Basic metabolic         Panel recheck          See Patient Instructions    Jeff Lara MD  [facility]    THE MEDICATION LIST HAS BEEN FULLY RECONCILED BY THE M.D. AND THE NURSING STAFF.

## 2017-01-23 NOTE — TELEPHONE ENCOUNTER
"Karen, home care nurse from [facility], states that she saw the patient today and she did make a comment to the nurse that she wished she was dead.  Also stated that she said she was \"good for nothing\" and that she just feels lazy.  She states pt does not have a plan to hurt self in anyway.  Refuses to go to ER.  Nurse states pt is anxious.  Did advise appoinment, nurse states that she does not feel that pt will come in.  Did advise that pt should be seen if symptoms are worrisome.  She will call pt son Hao to advise that he should make an appointment for his mother, or send to ER if symptoms worsen.     BRAD jones MD can call Karen at [phone] if needed.      "

## 2017-02-06 NOTE — TELEPHONE ENCOUNTER
Patient has increased Edema in legs and It is interfering with her walking and transferring . Ok to start Lymphedema therapy and wrapping of legs ?.Anel Li RN

## 2017-02-08 NOTE — MR AVS SNAPSHOT
After Visit Summary   2/8/2017    Magy Askew    MRN: [id number]           Patient Information     Date Of Birth          10/11/1923        Visit Information        Provider Department      2/8/2017 11:20 AM Jeff Lara MD [facility]        Today's Diagnoses     Essential hypertension, benign    -  1     CKD (chronic kidney disease) stage 4, GFR 15-29 ml/min (H)         Pulmonary hypertension (H)         Shortness of breath            Follow-ups after your visit        Additional Services     CARE COORDINATION REFERRAL       Services are provided by a Care Coordinator for people with complex needs such as: medical, social, or financial troubles.  The Care Coordinator works with the patient and their Primary Care Provider to determine health goals, obtain resources, achieve outcomes, and develop care plans that help coordinate the patient's care.     Reason for Referral: Caregiver Concerns, Concerns with ADLs/IADLs and Inpatient to Outpatient Transition    Provide additional details for Care Coordination to best meet the patient's current needs:     Clinical Staff have discussed the Care Coordination Referral with the patient and/or caregiver: yes                  Future tests that were ordered for you today     Open Future Orders        Priority Expected Expires Ordered    XR Chest 2 Views Routine 2/8/2017 2/8/2018 2/8/2017            Who to contact     If you have questions or need follow up information about today's clinic visit or your schedule please contact [facility] directly at [phone].  Normal or non-critical lab and imaging results will be communicated to you by MyChart, letter or phone within 4 business days after the clinic has received the results. If you do not hear from us within 7 days, please contact the clinic through MyChart or phone. If you have a critical or abnormal lab result, we will notify you by phone as soon as  "possible.  Submit refill requests through [facility] or call your pharmacy and they will forward the refill request to us. Please allow 3 business days for your refill to be completed.          Additional Information About Your Visit        [facility] Information     [facility] lets you send messages to your doctor, view your test results, renew your prescriptions, schedule appointments and more. To sign up, go to [email or web address] . Click on \"Log in\" on the left side of the screen, which will take you to the Welcome page. Then click on \"Sign up Now\" on the right side of the page.     You will be asked to enter the access code listed below, as well as some personal information. Please follow the directions to create your username and password.     Your access code is: [email or web address]  Expires: 2017  1:02 PM     Your access code will  in 90 days. If you need help or a new code, please call your [place] clinic or [phone].        Care EveryWhere ID     This is your Care EveryWhere ID. This could be used by other organizations to access your [facility] medical records  [id number]        Your Vitals Were     Pulse Temperature Pulse Oximetry Breastfeeding?          55 98  F (36.7  C) (Oral) 98% No         Blood Pressure from Last 3 Encounters:   17 156/78   17 178/80   17 186/82    Weight from Last 3 Encounters:   17 159 lb 14.4 oz (72.53 kg)   17 149 lb (67.586 kg)   17 149 lb 7.6 oz (67.8 kg)              We Performed the Following     Basic metabolic panel     BNP-N terminal pro     CARE COORDINATION REFERRAL          Today's Medication Changes          These changes are accurate as of: 17 11:32 AM.  If you have any questions, ask your nurse or doctor.               Start taking these medicines.        Dose/Directions    albuterol 108 (90 BASE) MCG/ACT Inhaler   Commonly known as:  PROAIR HFA/PROVENTIL HFA/VENTOLIN HFA   Used for:  Shortness of breath   Started by:  " Jeff Lara MD        Dose:  2 puff   Inhale 2 puffs into the lungs every 6 hours as needed for shortness of breath / dyspnea or wheezing   Quantity:  3 Inhaler   Refills:  1            Where to get your medicines      These medications were sent to [facility] [id number] [facility] [address] AT [address] [place] [address] & [address]  [address] [facility] [address]     Phone:  [phone]    - albuterol 108 (90 BASE) MCG/ACT Inhaler             Primary Care Provider Office Phone # Fax #    Jeff Lara [phone]       [facility] [address]  [facility] [address]        Thank you!     Thank you for choosing [facility]  for your care. Our goal is always to provide you with excellent care. Hearing back from our patients is one way we can continue to improve our services. Please take a few minutes to complete the written survey that you may receive in the mail after your visit with us. Thank you!             Your Updated Medication List - Protect others around you: Learn how to safely use, store and throw away your medicines at www.disposemymeds.org.          This list is accurate as of: 2/8/17 11:32 AM.  Always use your most recent med list.                   Brand Name Dispense Instructions for use    albuterol 108 (90 BASE) MCG/ACT Inhaler    PROAIR HFA/PROVENTIL HFA/VENTOLIN HFA    3 Inhaler    Inhale 2 puffs into the lungs every 6 hours as needed for shortness of breath / dyspnea or wheezing       amLODIPine 5 MG tablet    NORVASC    90 tablet    Take 1 tablet (5 mg) by mouth daily       hydrALAZINE 50 MG tablet    APRESOLINE    270 tablet    Take 1 tablet (50 mg) by mouth 3 times daily       metoprolol 25 MG 24 hr tablet    TOPROL-XL    180 tablet    Take 3 tablets (75 mg) by mouth 2 times daily       order for DME     1 Device    Equipment being ordered: ABBY hose,  1pair

## 2017-02-08 NOTE — PROGRESS NOTES
SUBJECTIVE:                                                    Magy Askew is a 89 year old female who presents to clinic today for the following health issues:      1. Follow up Lymphodemia  2. SOB x1m - Wheezing No other Sx    Problem list and histories reviewed & adjusted, as indicated.  Additional history: as documented    Patient Active Problem List   Diagnosis     Essential hypertension, benign     CARDIOVASCULAR SCREENING; LDL GOAL LESS THAN 130     ACP (advance care planning)     Myalgia     CKD (chronic kidney disease) stage 4, GFR 15-29 ml/min (H)     Cellulitis of right leg     Anxiety     Pulmonary hypertension (H)     Stasis dermatitis of both legs     History reviewed. No pertinent past surgical history.    Social History   Substance Use Topics     Smoking status: Never Smoker      Smokeless tobacco: Never Used     Alcohol Use: No     Family History   Problem Relation Age of Onset     C.A.D. Father      C.A.D. Daughter          Current Outpatient Prescriptions   Medication Sig Dispense Refill     amLODIPine (NORVASC) 5 MG tablet Take 1 tablet (5 mg) by mouth daily 90 tablet 1     hydrALAZINE (APRESOLINE) 50 MG tablet Take 1 tablet (50 mg) by mouth 3 times daily 270 tablet 3     order for DME Equipment being ordered: ABBY hose,  1pair 1 Device 0     metoprolol (TOPROL-XL) 25 MG 24 hr tablet Take 3 tablets (75 mg) by mouth 2 times daily 180 tablet 1     Allergies   Allergen Reactions     Dust Mites      Influenza A, H1n1 Nausea and Vomiting     With hives and past out     Strawberry      Terazosin Hives     BP Readings from Last 3 Encounters:   02/08/17 186/78   01/18/17 178/80   01/13/17 186/82    Wt Readings from Last 3 Encounters:   02/08/17 159 lb 14.4 oz (72.53 kg)   01/18/17 149 lb (67.586 kg)   01/12/17 149 lb 7.6 oz (67.8 kg)                    ROS:  C: NEGATIVE for fever, chills, change in weight  E/M: NEGATIVE for ear, mouth and throat problems  R: NEGATIVE for significant cough with +  SOB  GI: NEGATIVE for nausea, abdominal pain, heartburn, or change in bowel habits  : NEGATIVE for frequency, dysuria, or hematuria  H: NEGATIVE for bleeding problems  P: NEGATIVE for changes in mood or affect    OBJECTIVE:                                                    /78 mmHg  Pulse 55  Temp(Src) 98  F (36.7  C) (Oral)  Wt 159 lb 14.4 oz (72.53 kg)  SpO2 98%  Breastfeeding? No  Body mass index is 26.61 kg/(m^2).  GENERAL:  alert and using cane  EYES: Eyes grossly normal to inspection, extraocular movements - intact, and PERRL  HENT: ear canals- normal; TMs- normal; Nose- normal; Mouth- no ulcers, no lesions  NECK: no tenderness, no adenopathy, no asymmetry, no masses, no stiffness; thyroid- normal to palpation  RESP: lungs clear to auscultation - no rales, no rhonchi, no wheezes  CV: regular rates and rhythm, normal S1 S2, no S3 or S4 and no click or rub   MS: extremities- no gross deformities noted, noted 2+ edema  + lymphedema LE's bilaterally  PSYCH: Alert and oriented times 3; speech- coherent , normal rate and volume; able to articulate logical thoughts, able to abstract reason, no tangential thoughts, no hallucinations or delusions, affect- normal    Interpretation Summary     The visual ejection fraction is estimated at 65-70%.  There is mild to moderate concentric left ventricular hypertrophy.  Normal left ventricular wall motion  The right ventricle is normal in size and function.  The left atrium is mildly dilated.  Moderate (46-55mmHg) pulmonary hypertension is present.  The IVC is normal in size and reactivity with respiration, suggesting normal  central venous pressure.  There is no pericardial effusion.  Compared to prior study, there is no significant change.     ASSESSMENT/PLAN:                                                      (I10) Essential hypertension, benign  (primary encounter diagnosis)  Comment: stable on current therapy  Plan:     (N18.4) CKD (chronic kidney disease)  stage 4, GFR 15-29 ml/min (H)  Comment: recheck labs, encourage fluids  Plan: Basic metabolic panel, CARE COORDINATION         REFERRAL            (I27.2) Pulmonary hypertension (H)  Comment: ?component of below  Plan: Basic metabolic panel, XR Chest 2 Views, CARE         COORDINATION REFERRAL            (R06.02) Shortness of breath  Comment: check labs  Plan: BNP-N terminal pro, CARE COORDINATION REFERRAL,        albuterol (PROAIR HFA/PROVENTIL HFA/VENTOLIN         HFA) 108 (90 BASE) MCG/ACT Inhaler        Continue with Lymphedema therapy    See Patient Instructions    Jeff Lara MD  [facility]    25 minutes spent with this patient, face to face, discussing treatment options for listed problems above as well as side effects of appropriate medications.  Counseling time extended beyond 50% of the clinic visit.  Medication dosing, treatment plan and follow-up were discussed. Also reviewed need for primary care testing for patient.

## 2017-02-08 NOTE — TELEPHONE ENCOUNTER
Pt's Son (hao) says that you were going to send over this RX to their pharmacy after their appt today. Hao says that pt does not have a RX for metoprolol. So she needs one prescribed. RX pending.

## 2017-02-08 NOTE — Clinical Note
Health Care Home - Access Care Plan    About Me  Patient Name:  Magy Askew    YOB: 1923  Age:                            89 year old   [place] MRN:         [id number] Telephone Information:     Home Phone [phone]   Mobile Not on file.       Address:    [facility] [address] [place] [address] Email address:  No e-mail address on record      Emergency Contact(s)  Name Relationship Lgl Grd Work Phone Home Phone Mobile Phone   PITER RODRIGUEZ   [phone]             Health Maintenance: Routine Health maintenance Reviewed: Up to date    My Access Plan  Medical Emergency 911   Questions or concerns during clinic hours Primary Clinic Line, I will call the clinic directly: Primary Clinic: [facility] [phone]   24 Hour Appointment Line [phone] or  [phone])  (toll free)   24 Hour Nurse Line [phone] (toll free)   Questions or concerns outside clinic hours 24 Hour Appointment Line, I will call the after-hours on-call line:   [facility] [phone] or [phone] ([phone]) (toll-free)   Preferred Urgent Care Preferred Urgent Care: [facility], [phone]   Preferred Hospital Preferred Hospital: [facility]  [phone]   Preferred Pharmacy [place], [place] - [address] [place] [address]     Behavioral [facility] Crisis Line [facility], [phone] or [phone]     My Care Team Members  Patient Care Team       Relationship Specialty Notifications Start End    Jeff Lara MD PCP - General Internal Medicine  11/26/12     Phone: [phone] Fax: [phone]         [facility] [address] [facility] [address]    Jossie Gan LSW  Clinic Admissions 2/8/17     Phone: [phone] Fax: [phone]         [facility]        My Medical and Care Information  Problem List   Patient Active  Problem List   Diagnosis     Essential hypertension, benign     CARDIOVASCULAR SCREENING; LDL GOAL LESS THAN 130     ACP (advance care planning)     Myalgia     CKD (chronic kidney disease) stage 4, GFR 15-29 ml/min (H)     Cellulitis of right leg     Anxiety     Pulmonary hypertension (H)     Stasis dermatitis of both legs      Current Medications and Allergies:  See printed Medication Report    Mery, can you discuss the enclosed Health Care Directive with your son Hao?  We really feel like your wishes for end of life care should be on file.    Thank you!    Soraida IRENE

## 2017-02-08 NOTE — PROGRESS NOTES
Clinic Care Coordination Contact  OUTREACH    Referral Information:  Referral Source: PCP  Reason for Contact: SW to assess Mery's ability to continue living alone and manage her ADL's.    Discussion with Mery today (60+ minutes)  Grew up as youngest of several children; her mother  when Mery was 9 months old. Her father and oldest sister helped raise the 6 children. Her father worked in the coal mines. Mery reports being close with her siblings and having a deep respect for her father. Mery liked school; especially reading as many books as she could; she remembers hours and hours of playing outdoors including playing football with her brother and his friends; they let her be the kicker. Mery has grown children, grand children and great grandchildren. Mery did say she does not wish to live until she is 89 years old, but denies depression. Currently Mery has [facility] coming to her apt for RN/PT and a fruiend in the building who helps her bathe due to macular degeneration. Mery's son Hao is 58 yo and is very helpful; he takes her shopping, writes the checks to pay her bills, drives her to doctor appts and will help with whatever she needs. Mery made a point of saying how nice and friendly and caring Dr Lara is as are the RN and PT from [facility]. Mery's chart does not have a HCD on file or a signed Authorization to discuss PHI with her son Hao. [facility] is mailing a black HCD for Mery to discuss with Hao and has signed the Auth to discuss PHI for Mery due to her request/macular degeneration. Mery stated she misses reading books very much due to her eye sight.     Universal Utilization:   ED Visits in last year: 1  Hospital visits in last year: 1  Last PCP appointment: 17  Missed Appointments:  (0)  Concerns:  (Open to [facility] RN PT Lymphedema)  Multiple Providers or Specialists: 0    Clinical Concerns:  Current Medical Concerns: weakness, lymphedema,  BP    Current Behavioral Concerns: 0    Education Provided to patient: Role of CC   Clinical Pathway Name: None  Clinical Pathway: None    Medication Management:  self     Functional Status:  Mobility Status: Independent  Equipment Currently Used at Home: other (see comments) (not assessed)  Transportation: son/others           Psychosocial:  Current living arrangement:: I live alone ([facility])  Financial/Insurance: MDCR/Social Security looking forward to 3% raise       Resources and Interventions:  Current Resources:  (na);  (na)  PAS Number:  (na)  Senior Linkage Line Referral Placed:  (na)  Advanced Care Plans/Directives on file:: No  Referrals Placed:  (na)     Frequency of Care Coordination:  (As Needed)  Upcoming appointment:  (None)     Plan: SW support 1x/month; future contact with son for safety planning; future contact with [facility] to collaborate transition needs.  KHOA mailing 24 Hour Access Plan and blank copy of CAMRYN Gan [facility]  Clinic Care Coordinator-  Clinics: [place], [place], [place]  E-Mail: [email or web address]  Telephone: [phone]

## 2017-02-08 NOTE — NURSING NOTE
"Chief Complaint   Patient presents with     RECHECK     Lymphodemia     Breathing Problem       Initial /78 mmHg  Pulse 55  Temp(Src) 98  F (36.7  C) (Oral)  Wt 159 lb 14.4 oz (72.53 kg)  SpO2 98%  Breastfeeding? No Estimated body mass index is 26.61 kg/(m^2) as calculated from the following:    Height as of 1/9/17: 5' 5\" (1.651 m).    Weight as of this encounter: 159 lb 14.4 oz (72.53 kg).  Medication Reconciliation: complete   Roderick Siegel CMA   "

## 2017-02-09 NOTE — TELEPHONE ENCOUNTER
Received call from [facility] pharmacist regarding metoprolol rx sent to them today.  Pharmacist states pt had been getting the metoprolol tartrate but today's rx was for the succinate.   Did you mean for pt to make the change?  Pharmacy would like a response ELA Doll R.N.

## 2017-02-09 NOTE — TELEPHONE ENCOUNTER
Pharmacy calling again.  Previously was on Tartrate and Succinate was ordered.  Did you mean to change or should she continue on tartrate like she has been taking?

## 2017-02-09 NOTE — TELEPHONE ENCOUNTER
Per verbal order from Dr. Lara, pt to continue on what was originally prescribed at Nursing home.  New rx sent.

## 2017-02-09 NOTE — PROGRESS NOTES
Called patients son and discussed labs as well CXR results and wants to take conservative course, discussed short trial Lasix as option, watching renal function and use of Hydralazine with Lasix, BMP on Monday ordered.  ER advised  If worsens.

## 2017-02-09 NOTE — TELEPHONE ENCOUNTER
So, per the pharmacy sully Tamayo called Metoprolol Tartrate to their pharmacy on 1/13/17.  That is what she has been on for the last month.  So ok to continue?

## 2017-02-09 NOTE — TELEPHONE ENCOUNTER
Please see medications per inpatient hospitalization, script done by Dr. Spangler as ordered as Toprol XL, I do not see a discharge summary done thus have to base refill on script done 1/12/17 but appears may have been discontinued, may need to get patients discharge medication list for verification.  This was done in the hospital as was on Atenolol prior.

## 2017-02-10 NOTE — TELEPHONE ENCOUNTER
atenolol (TENORMIN) tablet 50 mg       Last Written Prescription Date: 0  Last Fill Quantity: 0,   # refills: 0  Last Office Visit with [facility], [facility] or [facility] prescribing provider: 2/08/2017  Future Office visit:    Next 5 appointments (look out 90 days)     Feb 13, 2017 11:00 AM   Office Visit with Jeff Lara MD   [facility]    [address] [address] [phone]                   Routing refill request to provider for review/approval because:  Medication is reported/discontinued

## 2017-02-10 NOTE — PROGRESS NOTES
Clinic Care Coordination Contact  [facility]/Voicemail    Referral Source: PCP  Clinical Data: Care Coordinator Outreach  Outreach attempted x 1.  Left message on voicemail with call back information and requested return call.  Plan: Care Coordinator attempted to reach son Hao.  Was able to leave a detailed message offering assistance if he needs help changing his mother's living situation. SW has submitted a signed Authorization to [employer]iscuss PHI to be scanned into Mery's chart. Left  call back number. Will route note to Dr Lara so he knows SW attempted to reach son on 2/10/17.    Soraida Gan [facility]  Clinic Care Coordinator-  Clinics: [place], [place], [place]  E-Mail: [email or web address]  Telephone: [phone]

## 2017-02-10 NOTE — TELEPHONE ENCOUNTER
Unclear why this is being requested as is now on Metoprolol and we spent many phone calls addressing dosing of this over last few days, see messages

## 2017-02-10 NOTE — PROGRESS NOTES
Clinic Care Coordination Contact  OUTREACH    Referral Information:  Referral Source: PCP  Reason for Contact: Hao called back today to discuss the concerns I left on his cell phone.        Universal Utilization:   ED Visits in last year: 1  Hospital visits in last year: 1  Last PCP appointment: 17  Missed Appointments:  (0)  Concerns:  (Open to [facility] RN PT Lymphedema)  Multiple Providers or Specialists: 0    Clinical Concerns:  Current Medical Concerns: multiple   Current Behavioral Concerns: 0    Education Provided to patient: 0   Clinical Pathway Name: None  Clinical Pathway: None    Medication Management:  Self/son will help if needed due to pt's macular degeneration     Functional Status:  Mobility Status: Independent; owns a 4 wheel walker with a seat  Equipment Currently Used at Home: other (see comments) (not assessed)  Transportation: usually son Hao           Psychosocial:  Current living arrangement:: I live alone ([facility])  Financial/Insurance: very fixed income-social security  KHOA learned it was Hao's 25 year old daughter-Mery's granddaughter- that was killed by a drunk  in ; Mery also had 2 daughters that  from complications of heart disease.       Resources and Interventions:  Current Resources:  (na);  (na)  PAS Number:  (na)  Senior Linkage Line Referral Placed:  (na)  Advanced Care Plans/Directives on file:: No  Referrals Placed:  KHOA recommended hao call [facility] Front Door services [phone] because it seems like Mery would be eligible for [facility] assistance to pay for help at home. ACG/EW     Patient/Caregiver understanding: Hao understands his mother is doing well cognitively but is struggling with physical ailments. We discussed a move to [facility] vs bringing more help into the home. Hao understands that with each move a senior makes they lose a little more of themselves and sometimes even the will to live. Hao is not comfortable  with his mom saying she doesn't want to be 89 years old. Discussed county waivered services which would help pay for increased care at home in pt's current setting.     Frequency of Care Coordination:  (As Needed)  Upcoming appointment:  2/13/17     Plan: Hao will call [facility] Services [phone] to ask for application for waivered services. Hao and Mery will be in clinic on Monday and will ask RN to please notify SWr so I can meet them in person. For now the plan is not a move to [facility]/higher level of care BUT hao understands pt's labs are off and Dr Lara may have medical concerns that would warrant a move to a higher level of care.    Soraida Gan [facility]  Clinic Care Coordinator-  Clinics: [place], [place], [place]  E-Mail: [email or web address]  Telephone: [phone]

## 2017-02-13 PROBLEM — I89.0 LYMPHEDEMA OF BOTH LOWER EXTREMITIES: Status: ACTIVE | Noted: 2017-01-01

## 2017-02-13 NOTE — PROGRESS NOTES
SUBJECTIVE:                                                    Magy Askew is a 89 year old female who presents to clinic today for the following health issues:      Review 2/8/17 lab results as well as 2/8/17 chest x-ray results- worsening of shortness of breath and swelling     Problem list and histories reviewed & adjusted, as indicated.  Additional history: as documented    Patient Active Problem List   Diagnosis     Essential hypertension, benign     CARDIOVASCULAR SCREENING; LDL GOAL LESS THAN 130     ACP (advance care planning)     Myalgia     CKD (chronic kidney disease) stage 4, GFR 15-29 ml/min (H)     Cellulitis of right leg     Anxiety     Pulmonary hypertension (H)     Stasis dermatitis of both legs     No past surgical history on file.    Social History   Substance Use Topics     Smoking status: Never Smoker     Smokeless tobacco: Never Used     Alcohol use No     Family History   Problem Relation Age of Onset     C.A.D. Father      C.A.D. Daughter          Current Outpatient Prescriptions   Medication Sig Dispense Refill     metoprolol (LOPRESSOR) 25 MG tablet Take 3 tablets (75 mg) by mouth 2 times daily 180 tablet 3     albuterol (PROAIR HFA/PROVENTIL HFA/VENTOLIN HFA) 108 (90 BASE) MCG/ACT Inhaler Inhale 2 puffs into the lungs every 6 hours as needed for shortness of breath / dyspnea or wheezing 3 Inhaler 1     amLODIPine (NORVASC) 5 MG tablet Take 1 tablet (5 mg) by mouth daily 90 tablet 1     hydrALAZINE (APRESOLINE) 50 MG tablet Take 1 tablet (50 mg) by mouth 3 times daily 270 tablet 3     order for DME Equipment being ordered: ABBY hose,  1pair 1 Device 0     furosemide (LASIX) 20 MG tablet Take 1 tablet (20 mg) by mouth daily 5 tablet 0     Allergies   Allergen Reactions     Dust Mites      Influenza A, H1n1 Nausea and Vomiting     With hives and past out     Strawberry      Terazosin Hives     BP Readings from Last 3 Encounters:   02/13/17 (!) 202/80   02/08/17 156/78   01/18/17 178/80  "   Wt Readings from Last 3 Encounters:   02/13/17 157 lb (71.2 kg)   02/08/17 159 lb 14.4 oz (72.5 kg)   01/18/17 149 lb (67.6 kg)                    ROS:  C: NEGATIVE for fever, chills, change in weight  E/M: NEGATIVE for ear, mouth and throat problems  CV: NEGATIVE for chest pain, palpitations or peripheral edema  GI: NEGATIVE for nausea, abdominal pain, heartburn, or change in bowel habits  : NEGATIVE for frequency, dysuria, or hematuria  M: NEGATIVE for significant arthralgias or myalgia  H: NEGATIVE for bleeding problems  P: NEGATIVE for changes in mood or affect    OBJECTIVE:                                                    /78  Pulse 59  Temp 98.6  F (37  C) (Oral)  Ht 5' 2\" (1.575 m)  Wt 157 lb (71.2 kg)  SpO2 96%  BMI 28.72 kg/m2  Body mass index is 28.72 kg/(m^2).  GENERAL: alert  EYES: Eyes grossly normal to inspection, extraocular movements - intact, and PERRL  HENT: ear canals- normal; TMs- normal; Nose- normal; Mouth- no ulcers, no lesions  NECK: no tenderness, no adenopathy, no asymmetry, no masses, no stiffness; thyroid- normal to palpation  RESP: lungs clear to auscultation - no rales, no rhonchi, no wheezes with slight decrease at base  CV: regular rates and rhythm, normal S1 S2, no S3 or S4 and no click or rub   NEURO: no focal changes  Edema 2+ unchanged  PSYCH: Alert and oriented times 3; speech- coherent , normal rate and volume; able to articulate logical thoughts, able to abstract reason, no tangential thoughts, no hallucinations or delusions, affect- normal     ASSESSMENT/PLAN:                                                      (I89.0) Lymphedema of both lower extremities  (primary encounter diagnosis)  Comment: will wrap both legs  Plan:     (N18.4) CKD (chronic kidney disease) stage 4, GFR 15-29 ml/min (H)  Comment: on therapy  Plan: furosemide (LASIX) 20 MG tablet, Basic         metabolic panel            (I10) Essential hypertension, benign  Comment: increase to BID dosing of " lasix, check BMP  Plan: furosemide (LASIX) 20 MG tablet              See Patient Instructions    Jeff Lara MD  [facility]

## 2017-02-13 NOTE — NURSING NOTE
"Chief Complaint   Patient presents with     Results       Initial BP (!) 202/80 (BP Location: Left arm, Patient Position: Chair, Cuff Size: Adult Regular)  Pulse 59  Temp 98.6  F (37  C) (Oral)  Ht 5' 2\" (1.575 m)  Wt 157 lb (71.2 kg)  SpO2 96%  BMI 28.72 kg/m2 Estimated body mass index is 28.72 kg/(m^2) as calculated from the following:    Height as of this encounter: 5' 2\" (1.575 m).    Weight as of this encounter: 157 lb (71.2 kg).  Medication Reconciliation: complete   Olinda Soliman CMA      "

## 2017-02-13 NOTE — MR AVS SNAPSHOT
"              After Visit Summary   2017    Magy Askew    MRN: [id number]           Patient Information     Date Of Birth          10/11/1923        Visit Information        Provider Department      2017 11:00 AM Jfef Lara MD [facility]        Today's Diagnoses     Lymphedema of both lower extremities    -  1    CKD (chronic kidney disease) stage 4, GFR 15-29 ml/min (H)        Essential hypertension, benign           Follow-ups after your visit        Who to contact     If you have questions or need follow up information about today's clinic visit or your schedule please contact [facility] directly at [phone].  Normal or non-critical lab and imaging results will be communicated to you by MyChart, letter or phone within 4 business days after the clinic has received the results. If you do not hear from us within 7 days, please contact the clinic through MyChart or phone. If you have a critical or abnormal lab result, we will notify you by phone as soon as possible.  Submit refill requests through [employer] or call your pharmacy and they will forward the refill request to us. Please allow 3 business days for your refill to be completed.          Additional Information About Your Visit        MyChart Information     [employer] lets you send messages to your doctor, view your test results, renew your prescriptions, schedule appointments and more. To sign up, go to [email or web address] . Click on \"Log in\" on the left side of the screen, which will take you to the Welcome page. Then click on \"Sign up Now\" on the right side of the page.     You will be asked to enter the access code listed below, as well as some personal information. Please follow the directions to create your username and password.     Your access code is: [email or web address]  Expires: 2017  1:02 PM     Your access code will  in 90 days. If you need help or a new code, please " "call your [facility] or [phone].        Care EveryWhere ID     This is your Care EveryWhere ID. This could be used by other organizations to access your [facility] medical records  [id number]        Your Vitals Were     Pulse Temperature Height Pulse Oximetry BMI (Body Mass Index)       59 98.6  F (37  C) (Oral) 5' 2\" (1.575 m) 96% 28.72 kg/m2        Blood Pressure from Last 3 Encounters:   02/13/17 160/78   02/08/17 156/78   01/18/17 178/80    Weight from Last 3 Encounters:   02/13/17 157 lb (71.2 kg)   02/08/17 159 lb 14.4 oz (72.5 kg)   01/18/17 149 lb (67.6 kg)              We Performed the Following     Basic metabolic panel          Today's Medication Changes          These changes are accurate as of: 2/13/17 11:28 AM.  If you have any questions, ask your nurse or doctor.               These medicines have changed or have updated prescriptions.        Dose/Directions    furosemide 20 MG tablet   Commonly known as:  LASIX   This may have changed:  when to take this   Used for:  CKD (chronic kidney disease) stage 4, GFR 15-29 ml/min (H), Essential hypertension, benign   Changed by:  Jeff Lara MD        Dose:  20 mg   Take 1 tablet (20 mg) by mouth 2 times daily   Quantity:  14 tablet   Refills:  0            Where to get your medicines      These medications were sent to [facility] [address] [place] [address] [address]  [address] [place] [id number]     Phone:  [phone]     furosemide 20 MG tablet                Primary Care Provider Office Phone # Fax #    Jeff Lara [phone]       [facility] [address]  [facility] [address]        Thank you!     Thank you for choosing [facility]  for your care. Our goal is always to provide you with excellent care. Hearing back from our patients is one way we can continue to improve our services. Please take a few minutes to complete the " written survey that you may receive in the mail after your visit with us. Thank you!             Your Updated Medication List - Protect others around you: Learn how to safely use, store and throw away your medicines at www.disposemymeds.org.          This list is accurate as of: 2/13/17 11:28 AM.  Always use your most recent med list.                   Brand Name Dispense Instructions for use    albuterol 108 (90 BASE) MCG/ACT Inhaler    PROAIR HFA/PROVENTIL HFA/VENTOLIN HFA    3 Inhaler    Inhale 2 puffs into the lungs every 6 hours as needed for shortness of breath / dyspnea or wheezing       amLODIPine 5 MG tablet    NORVASC    90 tablet    Take 1 tablet (5 mg) by mouth daily       furosemide 20 MG tablet    LASIX    14 tablet    Take 1 tablet (20 mg) by mouth 2 times daily       hydrALAZINE 50 MG tablet    APRESOLINE    270 tablet    Take 1 tablet (50 mg) by mouth 3 times daily       metoprolol 25 MG tablet    LOPRESSOR    180 tablet    Take 3 tablets (75 mg) by mouth 2 times daily       order for DME     1 Device    Equipment being ordered: ABBY hose,  1pair

## 2017-02-13 NOTE — PROGRESS NOTES
Clinic Care Coordination Contact  OUTREACH    Referral Information:  Referral Source: PCP  Reason for Contact: Face to Face in Clinic Room 28  Care Conference: Yes     Universal Utilization:   ED Visits in last year: 1  Hospital visits in last year: 1  Last PCP appointment: 02/08/17  Missed Appointments:  (0)  Concerns:  (Open to [facility] RN PT Lymphedema)  Multiple Providers or Specialists: 0      Functional Status:  Mobility Status: Independent cane  Equipment Currently Used at Home: other (see comments) (not assessed)  Transportation: son           Psychosocial:  Current living arrangement:: I live alone ([facility])     Resources and Interventions:  Current Resources:  (na);  (na)  PAS Number:  (na)  Senior Linkage Line Referral Placed:  (na)  Advanced Care Plans/Directives on file:: No  Referrals Placed:  (na)    Frequency of Care Coordination:  (As Needed)  Upcoming appointment:  (None)     Plan: Today SW met Mery and her son in person at the clinic. So far the plan is for Mery to remain in her current living situation; possible with increased help at home through [facility]. SW will out reach monthly to follow Mery.    Soraida Gan [facility]  Clinic Care Coordinator-  Clinics: [place], [place], [place]  E-Mail: [email or web address]  Telephone: [phone]

## 2017-02-14 NOTE — TELEPHONE ENCOUNTER
Prior authorization    Medication name [facility]  Insurance [facility]  Insurance ID number [id number]  Prior authorization faxed through cover my meds

## 2017-02-22 NOTE — TELEPHONE ENCOUNTER
Susie, home care nurse from [employer] [facility].  Pt was given a RX for lasix on 2/13/17 for BID dosing.  She completed this yesterday, was only given #14 tablets.  Home care nurse is questioning if needs to continue on medication.  She states that her breathing has improved, no wheezing, swelling is better and her BP today was 138/86.    If needs to continue on lasix, needs a new RX.    Call home care nurse back with advisement.  [phone]

## 2017-03-07 NOTE — TELEPHONE ENCOUNTER
Delay of MSW  Request MSW order to read as needed.   Order for  is on hold, pending son to order . Son has canceled 3 times for MSW.     Verbal OK given, per standing orders.  Form will be faxed to PCP to review, sign, and complete.  Jenise Kothari RN

## 2017-03-08 NOTE — TELEPHONE ENCOUNTER
"Dr. Lara,   Please advise,    Homecare PT is calling from pt's house, PT is there to do some home exercises. And she always checks VS prior to starting. She says that, Pt took a shower this morning, around 10am, (by herself).   She Took her meds around 11:15am.  Then VS taken by Physical Therapist: BP was taken around 12pm.   Initial BP at rest (right arm) was 202/90.  Physical Therapist Talked with pt and tried to have pt relax and then re-checked BP 2nd time about 10 mins later (right arm) it was: 202/80.  12:30pm BP (right arm) was 198/84.  And then 12:30pm Left arm checked was 210/70.  HR 48-52, steady, strong, slow.  O2 93-98% on room air.   Pt does say she will have Difficult breathing where she will get SOB, winded, and \"hard to breath\" (this has happened with activity and while resting).   Pt says she has asthma, and will have occasional wheezing.   No chest pain, her color is good, not dizzy, weak or lightheaded. No headache, but she does admit to feeling stressed and anxious.  She slept fine last night, is drinking fluids.  She Has chronic edema in both  Legs. R leg has edema up to her upper thigh, and R leg is more swollen than normal.  Previous BP's:  3/7-- 150/90 (yesterday)  3/6-- 162/84  3/3-- 162/82    What do you recommend for pt?  Please call either pt or Sara the homecare PT.    "

## 2017-03-08 NOTE — TELEPHONE ENCOUNTER
Difficult to assess these patient over phone call, suggest needs clinic appt for BP check.  In meantime, suggest giving additional dose of Lasix today times 1 and if develops SOB above normal then suggest ER assessment especially if BP elevated

## 2017-03-08 NOTE — TELEPHONE ENCOUNTER
"Pt informed, she says that her son is sick with the flu and she does not drive, so she does not have a way to come in for appt. She says she is \"feeling better.\" And she will go to ER if SOB worsens.  PT-Sara informed of message as well.  "

## 2017-03-15 NOTE — TELEPHONE ENCOUNTER
Teresita, [facility] calling with update on patient.  Patient's blood pressure readings today were as follows: 202/80, 190/82, 202/80.  HR was 44.  Nurse wondering if medications need to be adjusted.  Patient is having some anxiety due to financial issues which may be increasing BP.  Please advise.

## 2017-03-15 NOTE — TELEPHONE ENCOUNTER
As per my 3/8/17 similar phone message, this patient needs to make an appt to get her BP checked in clinic as it is difficult to address this over the phone in a 89 y.o.  Could try taking Amlodipine 5mg po BID BUT if no response needs appt,.

## 2017-03-16 NOTE — PROGRESS NOTES
Clinic Care Coordination Contact  OUTREACH    Referral Information:  Referral Source: PCP  Reason for Contact: Follow up. Pt BP in 200's at times. OV scheduled for next Tuesday at 10:00. HC agency involved HHA and RN.  KHOA had naz discussion with Hao about his mom needing snf vs HC 3x/week. Bottom line was the finances. KHOA explained there is 1 snf that will let clients move in with little ($10,000.00) resources and go right onto a county waiver. Explained SW cannot promise this because it depends on openings but it is worth a try.     KHOA emphasized importance of mom moving to a higher LOC.    Hao agreed he would not bring this up with his mom; he will wait until they are in clinic next Tuesday and Dr Lara and KHOA can address this with mom.       Goals:   Goal 1 Statement:  (Hao and KHOA discussed UMA for Magy)  Goal 1 Progression Percent: 20%  Goal 1 Progression Date: 03/16/17    Barriers: Mery fears loss of independence  Strengths: Very cognitively intact  Patient/Caregiver understanding: yes, after KHOA approached snf subject several times. Hao is very caring/attentive of his mother. I question if this is harder for him than Mery.  Frequency of Care Coordination:  (As Needed)  Upcoming appointment: 03/21/17     Plan: KHOA realized a scheduling problem for SW to be present next Tuesday, In clinic tomorrow SW will see if pt appt can be scheduled for later in the day, around 3 PM or later.    Soraida Gan [facility]  Clinic Care Coordinator-  Clinics: [place], [place], [place]  E-Mail: [email or web address]  Telephone: [phone]

## 2017-03-16 NOTE — LETTER
[facility] Home  Complex Care Plan  About Me  Patient Name:  Magy Askew    YOB: 1923  Age:   89 year old   [place] MRN: [id number] Telephone Information:     Home Phone [phone]   Mobile Not on file.       Address:    [facility] [address]  [facility] [address] Email address:  No e-mail address on record      Emergency Contact(s)  Name Relationship Lgl Grd Work Phone Home Phone Mobile Phone   PITER RODRIGUEZ Son   [phone]           Primary language:  English     needed? No   [place] [facility]:  [phone] op. 1  Other communication barriers: No  Preferred Method of Communication:  Letter, Phone  Current living arrangement: I live alone ([facility])  Mobility Status/ Medical Equipment: Independent  Other information to know about me:    Health Maintenance  Health Maintenance Reviewed: Up to date    My Access Plan  Medical Emergency 911   Primary Clinic Line [facility]- [phone]   24 Hour Appointment Line [phone] or  [phone]) (toll-free)   24 Hour Nurse Line [phone] (toll-free)   Preferred Urgent Care [facility], [phone]   Preferred Hospital [facility]  [phone]   Preferred Pharmacy [facility], [place] - [address] [place] [address]     Behavioral Health Crisis Line [facility], [phone] or 911     My Care Team Members  Patient Care Team       Relationship Specialty Notifications Start End    Jeff Lara MD PCP - General Internal Medicine  11/26/12     Phone: [phone] Fax: [phone]         [facility] [address] [facility] [address]    Jossie Gan LSW  Clinic Admissions 2/8/17     Phone: [phone] Fax: [phone]         [facility] [address] [place] [address]    [facility] Care Nurse [facility] ([facility]   2/8/17     Comment:  RN/PT     Phone: [phone]              My Care Plans  Self Management and Treatment Plan  Goals and (Comments)  Goal #1:  (Hao and KHOA discussed UMA for Magy)      20% of goal reached      Action Plans on File: None  Advance Care Plans/Directives Type:   Type Advanced Care Plans/Directives:  (na)    My Medical and Care Information  Problem List   Patient Active Problem List   Diagnosis     Essential hypertension, benign     CARDIOVASCULAR SCREENING; LDL GOAL LESS THAN 130     ACP (advance care planning)     Myalgia     CKD (chronic kidney disease) stage 4, GFR 15-29 ml/min (H)     Cellulitis of right leg     Anxiety     Pulmonary hypertension (H)     Stasis dermatitis of both legs     Lymphedema of both lower extremities      Current Medications and Allergies:  See printed Medication Report.    Care Coordination Start Date:     Frequency of Care Coordination:  (As Needed)   Form Last Updated: 03/16/2017

## 2017-03-16 NOTE — TELEPHONE ENCOUNTER
As suggested this patient really needs a clinic appt.  Amlodipine is generic and already on Metoprolol + Lasix + Spironolactone.  Attempting to manage w/o adding 5th medciation

## 2017-03-17 NOTE — TELEPHONE ENCOUNTER
Spoke with Sara and gave Dr. Lara's verbal orders.  Sara will talk with home care nurse manager as well as patient to get order in place.

## 2017-03-17 NOTE — TELEPHONE ENCOUNTER
Please make sure taking Amlodipine 5mg po BID as directed and increase Lasix to 20mg po BID(suggest 8AM and 4PM) dosing

## 2017-03-17 NOTE — TELEPHONE ENCOUNTER
EFE Ruiz, [facility], calling with patient's high blood pressure readings again today.  BP was 202/80 and 210/80.  Patient also has increased anxiety due to concerns over blood pressure.  Scheduled for office visit on 3/21.

## 2017-03-21 NOTE — MR AVS SNAPSHOT
"              After Visit Summary   3/21/2017    Magy Askew    MRN: [id number]           Patient Information     Date Of Birth          10/11/1923        Visit Information        Provider Department      3/21/2017 10:00 AM Jeff Lara MD [facility]        Today's Diagnoses     Essential hypertension, benign    -  1    Anxiety           Follow-ups after your visit        Follow-up notes from your care team     Return if symptoms worsen or fail to improve.      Who to contact     If you have questions or need follow up information about today's clinic visit or your schedule please contact [facility] directly at [phone].  Normal or non-critical lab and imaging results will be communicated to you by MyChart, letter or phone within 4 business days after the clinic has received the results. If you do not hear from us within 7 days, please contact the clinic through MyChart or phone. If you have a critical or abnormal lab result, we will notify you by phone as soon as possible.  Submit refill requests through [employer] or call your pharmacy and they will forward the refill request to us. Please allow 3 business days for your refill to be completed.          Additional Information About Your Visit        MyChart Information     [employer] lets you send messages to your doctor, view your test results, renew your prescriptions, schedule appointments and more. To sign up, go to [email or web address] . Click on \"Log in\" on the left side of the screen, which will take you to the Welcome page. Then click on \"Sign up Now\" on the right side of the page.     You will be asked to enter the access code listed below, as well as some personal information. Please follow the directions to create your username and password.     Your access code is: [email or web address]  Expires: 2017 10:50 AM     Your access code will  in 90 days. If you need help or a new code, please call " your [place] clinic or [phone].        Care EveryWhere ID     This is your Care EveryWhere ID. This could be used by other organizations to access your [place] medical records  [id number]        Your Vitals Were     Pulse Temperature Pulse Oximetry BMI (Body Mass Index)          49 97.5  F (36.4  C) (Oral) 98% 25.79 kg/m2         Blood Pressure from Last 3 Encounters:   03/21/17 158/80   02/13/17 160/78   02/08/17 156/78    Weight from Last 3 Encounters:   03/21/17 141 lb (64 kg)   02/13/17 157 lb (71.2 kg)   02/08/17 159 lb 14.4 oz (72.5 kg)              Today, you had the following     No orders found for display         Today's Medication Changes          These changes are accurate as of: 3/21/17 10:50 AM.  If you have any questions, ask your nurse or doctor.               Start taking these medicines.        Dose/Directions    citalopram 10 MG tablet   Commonly known as:  celeXA   Used for:  Anxiety   Started by:  Jeff Lara MD        Dose:  10 mg   Take 1 tablet (10 mg) by mouth daily   Quantity:  90 tablet   Refills:  3            Where to get your medicines      These medications were sent to [facility] [address] [place] [address] & [address], [place] [address]     Phone:  [phone]     citalopram 10 MG tablet                Primary Care Provider Office Phone # Fax #    Jeff Lara [phone]       [facility] [address] [facility] [address]        Thank you!     Thank you for choosing [facility]  for your care. Our goal is always to provide you with excellent care. Hearing back from our patients is one way we can continue to improve our services. Please take a few minutes to complete the written survey that you may receive in the mail after your visit with us. Thank you!             Your Updated Medication List - Protect others around you: Learn how to safely use,  store and throw away your medicines at www.disposemymeds.org.          This list is accurate as of: 3/21/17 10:50 AM.  Always use your most recent med list.                   Brand Name Dispense Instructions for use    albuterol 108 (90 BASE) MCG/ACT Inhaler    PROAIR HFA/PROVENTIL HFA/VENTOLIN HFA    3 Inhaler    Inhale 2 puffs into the lungs every 6 hours as needed for shortness of breath / dyspnea or wheezing       amLODIPine 5 MG tablet    NORVASC    90 tablet    Take 1 tablet (5 mg) by mouth 2 times daily       citalopram 10 MG tablet    celeXA    90 tablet    Take 1 tablet (10 mg) by mouth daily       furosemide 20 MG tablet    LASIX    60 tablet    Take 1 tablet (20 mg) by mouth daily       hydrALAZINE 50 MG tablet    APRESOLINE    270 tablet    Take 1 tablet (50 mg) by mouth 3 times daily       metoprolol 25 MG tablet    LOPRESSOR    180 tablet    Take 3 tablets (75 mg) by mouth 2 times daily       order for DME     1 Device    Equipment being ordered: ABBY hose,  1pair

## 2017-03-21 NOTE — PROGRESS NOTES
SUBJECTIVE:                                                    Magy Askew is a 89 year old female who presents to clinic today for the following health issues:      Hypertension Follow-up      Outpatient blood pressures are being checked at home.  Results are 200's/80's .    Low Salt Diet: no added salt       Amount of exercise or physical activity: None    Problems taking medications regularly: No    Medication side effects: none    Diet: low salt      Problem list and histories reviewed & adjusted, as indicated.  Additional history: as documented    Patient Active Problem List   Diagnosis     Essential hypertension, benign     CARDIOVASCULAR SCREENING; LDL GOAL LESS THAN 130     ACP (advance care planning)     Myalgia     CKD (chronic kidney disease) stage 4, GFR 15-29 ml/min (H)     Cellulitis of right leg     Anxiety     Pulmonary hypertension (H)     Stasis dermatitis of both legs     Lymphedema of both lower extremities     History reviewed. No pertinent past surgical history.    Social History   Substance Use Topics     Smoking status: Never Smoker     Smokeless tobacco: Never Used     Alcohol use No     Family History   Problem Relation Age of Onset     C.A.D. Father      C.A.D. Daughter          Current Outpatient Prescriptions   Medication Sig Dispense Refill     amLODIPine (NORVASC) 5 MG tablet Take 1 tablet (5 mg) by mouth 2 times daily 90 tablet 1     furosemide (LASIX) 20 MG tablet Take 1 tablet (20 mg) by mouth daily 60 tablet 5     metoprolol (LOPRESSOR) 25 MG tablet Take 3 tablets (75 mg) by mouth 2 times daily 180 tablet 3     albuterol (PROAIR HFA/PROVENTIL HFA/VENTOLIN HFA) 108 (90 BASE) MCG/ACT Inhaler Inhale 2 puffs into the lungs every 6 hours as needed for shortness of breath / dyspnea or wheezing 3 Inhaler 1     hydrALAZINE (APRESOLINE) 50 MG tablet Take 1 tablet (50 mg) by mouth 3 times daily 270 tablet 3     order for DME Equipment being ordered: ABBY hose,  1pair 1 Device 0      [DISCONTINUED] furosemide (LASIX) 20 MG tablet Take 1 tablet (20 mg) by mouth 2 times daily 14 tablet 0     Allergies   Allergen Reactions     Dust Mites      Influenza A, H1n1 Nausea and Vomiting     With hives and past out     Strawberry      Terazosin Hives     BP Readings from Last 3 Encounters:   03/21/17 180/78   02/13/17 160/78   02/08/17 156/78    Wt Readings from Last 3 Encounters:   03/21/17 141 lb (64 kg)   02/13/17 157 lb (71.2 kg)   02/08/17 159 lb 14.4 oz (72.5 kg)           Reviewed and updated as needed this visit by clinical staff  Tobacco  Allergies  Med Hx  Surg Hx  Fam Hx  Soc Hx      Reviewed and updated as needed this visit by Provider       ROS:  C: NEGATIVE for fever, chills, change in weight  E/M: NEGATIVE for ear, mouth and throat problems  R: NEGATIVE for significant cough or SOB  CV: NEGATIVE for chest pain, palpitations or peripheral edema  GI: NEGATIVE for nausea, abdominal pain, heartburn, or change in bowel habits  : NEGATIVE for frequency, dysuria, or hematuria  M: NEGATIVE for significant arthralgias or myalgia  H: NEGATIVE for bleeding problems    OBJECTIVE:                                                    /80  Pulse (!) 49  Temp 97.5  F (36.4  C) (Oral)  Wt 141 lb (64 kg)  SpO2 98%  BMI 25.79 kg/m2  Body mass index is 25.79 kg/(m^2).  GENERAL: alert and no distress  EYES: Eyes grossly normal to inspection, extraocular movements - intact, and PERRL  HENT: ear canals- normal; TMs- normal; Nose- normal; Mouth- no ulcers, no lesions  NECK: no tenderness, no adenopathy, no asymmetry, no masses, no stiffness; thyroid- normal to palpation  RESP: lungs clear to auscultation - no rales, no rhonchi, no wheezes  CV: regular rates and rhythm, normal S1 S2, no S3 or S4 and no click or rub   ABDOMEN: soft, no tenderness, no  hepatosplenomegaly, no masses, normal bowel sounds  MS: extremities- no gross deformities noted, no edema  PSYCH: Alert and oriented times 3; speech-  coherent , normal rate and volume; able to articulate logical thoughts, able to abstract reason, no tangential thoughts, no hallucinations or delusions, affect- anxious       ASSESSMENT/PLAN:                                                    (I10) Essential hypertension, benign  (primary encounter diagnosis)  Comment: recheck of BP after 25 minutes and patient more relaxed shows much better control affirming below comments.    Plan: BP check prn, advised getting home monitor.    (F41.9) Anxiety  Comment: start therapy  Plan: citalopram (CELEXA) 10 MG tablet        I've explained to her that drugs of the SSRI class can have side effects such as weight gain, sexual dysfunction, insomnia, headache, nausea. These medications are generally effective at alleviating symptoms of anxiety and/or depression. Let me know if significant side effects do occur.    I had a long discussion with patient as well as son and discussed issues relayed to me by Care coordinators yesterday in clinic regarding placement issues and my advisement of such.  The sone really feels it is patients anxiety and her dislike to certain in home providers that come to check on her and this causes her anxiety to elevate thus her BP going up. I interpreted their comments that they are really not to keen at leaving home at present.  Will have care coor. Contact for further discussion.  Suggest, as above, treating anxiety but would avoid BZD's as pe risk for falls, suggest home BP monitor and starting low dose SSRI.    See Patient Instructions    Jeff Lara MD  [facility]    25 minutes spent with this patient/son, face to face, discussing treatment options for listed problems above as well as side effects of appropriate medications.  Counseling time extended beyond 50% of the clinic visit.  Medication dosing, treatment plan and follow-up were discussed. Also reviewed need for primary care testing for patient.

## 2017-03-21 NOTE — NURSING NOTE
"Chief Complaint   Patient presents with     Hypertension       Initial /78  Pulse (!) 49  Temp 97.5  F (36.4  C) (Oral)  Wt 141 lb (64 kg)  SpO2 98%  BMI 25.79 kg/m2 Estimated body mass index is 25.79 kg/(m^2) as calculated from the following:    Height as of 2/13/17: 5' 2\" (1.575 m).    Weight as of this encounter: 141 lb (64 kg).  Medication Reconciliation: complete   Olinda Soliman CMA      "

## 2017-03-27 NOTE — DISCHARGE SUMMARY
DATE OF ADMISSION:  01/09/2017      DATE OF DISCHARGE:  01/12/2017      PRIMARY CARE PHYSICIAN:  Jeff Lara MD      CHIEF COMPLAINT:  Increased itching of her right leg.      HISTORY OF PRESENT ILLNESS:  Magy Askew is a 89-year-old female who lives alone.  She has had chronic lower extremity edema but the son reports this is typically only in her feet, so she has had 2 week history of increasing swelling, redness, and discomfort of her right leg, though she has got swelling in both legs.  The patient is admitted as having possible right lower extremity cellulitis and started on Ancef.  For further details, I refer to the dictation by Dr. Chris Short.      DISCHARGE DIAGNOSIS:   1.  Right lower extremity cellulitis.   2.  Hypertension.   3.  Lower extremity edema.   4.  Chronic kidney disease.   5.  Deconditioning, discharge for TCU.      DISCHARGE MEDICATIONS:    1.  Metoprolol 7.5 mg b.i.d.   2.  Keflex 500 mg every 12 hours.   3.  Hydralazine 50 mg t.i.d.       ALLERGIES:  Dust mites, influenza, strawberries, terazosin.      DISPOSITION:     1.  Patient discharged to TCU.   2.  Patient is to follow up with her primary care physician after being discharged from the TCU.   3.  Diet as tolerated.   4.  Activities as tolerated.    5.  CODE STATUS:  Full.      HOSPITAL COURSE:     1.  Right lower extremity cellulitis.  Patient ultrasound was negative for DVT.  Patient was on Ancef.  She improved with that as well as ACE wrap.  Lymphedema count was done.  Her edema improved, her redness improved.  Patient is being discharge with further lymphedema treatment and balance creatinine at the TCU.     2.  Hypertension.  Patient continue metoprolol.  Her hydralazine dose was increased from 25 to 50 mg.  Due to her lower extremity edema, amlodipine has been discontinued.  We also discontinued her losartan due to acute kidney injury.   3.  Lower extremity edema.  Patient is now off Norvasc.  Echocardiogram showed  hyperdynamic left ventricular ejection fraction with moderate pulmonary hypertension with early grade 1 diastolic dysfunction.     4.  Chronic kidney disease.  Patient's creatinine improved from 3 to 2.84 after discontinuing losartan.     5.  Deconditioning.  Patient is being discharged to TCU.         BALWINDER GAMING MD             D: 2017 05:06   T: 2017 19:38   MT: [id number]      Name:     JARRETT LOPZE   MRN:      [id number]        Account:        [id number]   :      10/11/1923           Admit Date:                                       Discharge Date: 2017      Document: [id number]       cc: Jeff Lara MD

## 2017-04-10 NOTE — PROGRESS NOTES
"Clinic Care Coordination Contact  OUTREACH    Referral Information:  Referral Source: PCP  Reason for Contact: Follow up  Care Conference: No     Universal Utilization:   ED Visits in last year: 1  Hospital visits in last year: 1  Last PCP appointment: 03/21/17  Missed Appointments:  (0)  Concerns:  (closed to [facility] RN PT Lymphedema 3/31/17))  Multiple Providers or Specialists: 0    Clinical Concerns:  Current Medical Concerns: lymphedema  htn  Current Behavioral Concerns: anxiety increases when home care comes in to see her so she cancelled home care  Education Provided to patient: What living at an half-way is like   Clinical Pathway Name: None  Clinical Pathway: None    Medication Management:  Self      Functional Status:  Mobility Status: Independent  Equipment Currently Used at Home: other (see comments) (not assessed)  Transportation: Hao - son           Psychosocial:  Current living arrangement:: I live alone ([facility])  Financial/Insurance: SS and MDCR       Resources and Interventions:  Current Resources: Assisted Living   PAS Number:  (na)  Senior Linkage Line Referral Placed:  (na)  Advanced Care Plans/Directives on file:: No-this is something we could work on  Referrals Placed:  (na)     Goals:   Goal 1 Statement:  (Hao and [facility] discussed UMA for Magy)  Goal 1 Progression Percent: 30%  Goal 1 Progression Date: 04/10/17     Barriers: thinking about all of her social security money going to cover the cost of UMA  Strengths: Has invited SW to call her once a month \"just in case something changes; for now I have all my marbles\"  Patient/Caregiver understanding: good  Frequency of Care Coordination:  (As Needed)  Upcoming appointment:  (none scheduled)     Plan: [facility] to continue to follow to assist with life transitions as they arise.    Soraida Gan [facility]  Clinic Care Coordinator-  Clinics: [place], [place], [place]  E-Mail: [email or web address]  Telephone: " [phone]

## 2017-04-24 NOTE — PROGRESS NOTES
Clinic Care Coordination Contact  OUTREACH    Referral Information:  Referral Source: PCP  Reason for Contact: Hao called [facility]. Mery's building is being remodeled-rent will jump to 1700/month-no longer affordable.  Hao asked for telephone number to [facility]. He will call to schedule a tour. [facility] allows move-ins even if you do not have minimum of 2 years of private pay funds. They will accept [facility] services for room and board.  Care Conference: No     Universal Utilization:   ED Visits in last year: 1  Hospital visits in last year: 1  Last PCP appointment: 03/21/17  Missed Appointments:  (0)  Concerns:  (Open to [facility] RN PT Lymphedema)  Multiple Providers or Specialists: 0    Clinical Concerns:  Current Medical Concerns: BP, edema much improved per hao    Current Behavioral Concerns: 0    Education Provided to patient: halfway and how to pay for them   Clinical Pathway Name: None  Clinical Pathway: None    Medication Management:  Self/son over-sees     Functional Status:  Mobility Status: Independent  Equipment Currently Used at Home: other (see comments) (not assessed)  Transportation: son           Psychosocial:  Current living arrangement:: I live alone ([facility])  Financial/Insurance:  MDCR Under 30,000 dollars in savings       Resources and Interventions:  Current Resources: Assisted Living (na);  (na)  PAS Number:  (na)  Senior Linkage Line Referral Placed:  (na)  Advanced Care Plans/Directives on file:: No  Referrals Placed:  (na)     Goals:   Goal 1 Statement:  (Hao and [facility] discussed halfway for Magy)  Goal 1 Progression Percent: 50%  Goal 1 Progression Date: 04/24/17     Barriers: pending tour out come  Strengths: will let her son help guide her now that she cannot afford to stay living where she is.  Patient/Caregiver understanding: good  Frequency of Care Coordination:  (As Needed)  Upcoming appointment:  (none scheduled)     Plan: Hao will take his mom to tour  [facility]. [facility] will route as FYI to PCP.    Soraida Gan [facility]  Clinic Care Coordinator-  Clinics: [place], [place], [place]  E-Mail: [email or web address]  Telephone: [phone]

## 2017-04-26 NOTE — PROGRESS NOTES
Clinic Care Coordination Contact  OUTREACH    Referral Information:  Referral Source: PCP  Reason for Contact: Hao called [facility] to explain he doesn't have the medical terms to tell [facility] why mom needs an penitentiary. Hao stated he is taking Mery to see Dr Lara tomorrow for a red spot on her leg. [facility] recommended Hao ask PCP or information to share with [facility] so they can assess Mery for move in.    Soraida Gan \[facility]\""  Clinic Care Coordinator-  Clinics: [place], [place], [place]  E-Mail: [email or web address]  Telephone: [phone]

## 2017-04-26 NOTE — TELEPHONE ENCOUNTER
Want to get together with son, Hao Askew, to discuss setting up assisted living. [phone], ok to lv detailed message, anytime.

## 2017-04-27 NOTE — MR AVS SNAPSHOT
After Visit Summary   4/27/2017    Magy Askew    MRN: [id number]           Patient Information     Date Of Birth          10/11/1923        Visit Information        Provider Department      4/27/2017 2:20 PM Jeff Lara MD [facility]        Today's Diagnoses     Essential hypertension, benign    -  1    Rash        Pulmonary hypertension (H)           Follow-ups after your visit        Additional Services     CARE COORDINATION REFERRAL       Services are provided by a Care Coordinator for people with complex needs such as: medical, social, or financial troubles.  The Care Coordinator works with the patient and their Primary Care Provider to determine health goals, obtain resources, achieve outcomes, and develop care plans that help coordinate the patient's care.     Reason for Referral: Caregiver Concerns, Concerns with ADLs/IADLs, Home Safety Concerns and Other: assisted living options    Provide additional details for Care Coordination to best meet the patient's current needs:     Clinical Staff have discussed the Care Coordination Referral with the patient and/or caregiver: yes                  Who to contact     If you have questions or need follow up information about today's clinic visit or your schedule please contact [facility] directly at [phone].  Normal or non-critical lab and imaging results will be communicated to you by MyChart, letter or phone within 4 business days after the clinic has received the results. If you do not hear from us within 7 days, please contact the clinic through MyChart or phone. If you have a critical or abnormal lab result, we will notify you by phone as soon as possible.  Submit refill requests through [employer] or call your pharmacy and they will forward the refill request to us. Please allow 3 business days for your refill to be completed.          Additional Information About Your Visit       "  MyChart Information     [employer] lets you send messages to your doctor, view your test results, renew your prescriptions, schedule appointments and more. To sign up, go to [email or web address] . Click on \"Log in\" on the left side of the screen, which will take you to the Welcome page. Then click on \"Sign up Now\" on the right side of the page.     You will be asked to enter the access code listed below, as well as some personal information. Please follow the directions to create your username and password.     Your access code is: [email or web address]  Expires: 2017 10:50 AM     Your access code will  in 90 days. If you need help or a new code, please call your [place] clinic or [phone].        Care EveryWhere ID     This is your Care EveryWhere ID. This could be used by other organizations to access your [place] medical records  [id number]        Your Vitals Were     Pulse Temperature Height Pulse Oximetry BMI (Body Mass Index)       60 98.7  F (37.1  C) (Oral) 4' 11.5\" (1.511 m) 98% 23.97 kg/m2        Blood Pressure from Last 3 Encounters:   17 158/80   17 158/80   17 160/78    Weight from Last 3 Encounters:   17 120 lb 11.2 oz (54.7 kg)   17 141 lb (64 kg)   17 157 lb (71.2 kg)              We Performed the Following     Basic metabolic panel     CARE COORDINATION REFERRAL        Primary Care Provider Office Phone # Fax #    Jeff Lara [phone]       [facility] [place] [address] [facility] [address]        Thank you!     Thank you for choosing [facility]  for your care. Our goal is always to provide you with excellent care. Hearing back from our patients is one way we can continue to improve our services. Please take a few minutes to complete the written survey that you may receive in the mail after your visit with us. Thank you!             Your Updated Medication List - Protect others around you: " Learn how to safely use, store and throw away your medicines at www.disposemymeds.org.          This list is accurate as of: 4/27/17  2:32 PM.  Always use your most recent med list.                   Brand Name Dispense Instructions for use    albuterol 108 (90 BASE) MCG/ACT Inhaler    PROAIR HFA/PROVENTIL HFA/VENTOLIN HFA    3 Inhaler    Inhale 2 puffs into the lungs every 6 hours as needed for shortness of breath / dyspnea or wheezing       amLODIPine 5 MG tablet    NORVASC    90 tablet    Take 1 tablet (5 mg) by mouth 2 times daily       citalopram 10 MG tablet    celeXA    90 tablet    Take 1 tablet (10 mg) by mouth daily       furosemide 20 MG tablet    LASIX    60 tablet    Take 1 tablet (20 mg) by mouth daily       hydrALAZINE 50 MG tablet    APRESOLINE    270 tablet    Take 1 tablet (50 mg) by mouth 3 times daily       metoprolol 25 MG tablet    LOPRESSOR    180 tablet    Take 3 tablets (75 mg) by mouth 2 times daily       order for DME     1 Device    Equipment being ordered: ABBY hose,  1pair

## 2017-04-27 NOTE — NURSING NOTE
"Chief Complaint   Patient presents with     Derm Problem       Initial BP (!) 208/90  Pulse 60  Temp 98.7  F (37.1  C) (Oral)  Ht 4' 11.5\" (1.511 m)  Wt 120 lb 11.2 oz (54.7 kg)  SpO2 98%  BMI 23.97 kg/m2 Estimated body mass index is 23.97 kg/(m^2) as calculated from the following:    Height as of this encounter: 4' 11.5\" (1.511 m).    Weight as of this encounter: 120 lb 11.2 oz (54.7 kg).  Medication Reconciliation: complete   Olinda Soliman CMA      "

## 2017-04-27 NOTE — PROGRESS NOTES
SUBJECTIVE:                                                    Magyjackson Askew is a 89 year old female who presents to clinic today for the following health issues:    Patient D/C citalopram because she felt she was on too many medications.     Rash      Duration: Unknown, noted by Son yesterday    Description  Location: Right lower leg   Itching: mild    Intensity:  moderate    Accompanying signs and symptoms: redness     History (similar episodes/previous evaluation): hx of cellulitis    Precipitating or alleviating factors:  New exposures:  None  Recent travel: no      Therapies tried and outcome: none       Problem list and histories reviewed & adjusted, as indicated.  Additional history: as documented    Patient Active Problem List   Diagnosis     Essential hypertension, benign     CARDIOVASCULAR SCREENING; LDL GOAL LESS THAN 130     ACP (advance care planning)     Myalgia     CKD (chronic kidney disease) stage 4, GFR 15-29 ml/min (H)     Cellulitis of right leg     Anxiety     Pulmonary hypertension (H)     Stasis dermatitis of both legs     Lymphedema of both lower extremities     History reviewed. No pertinent surgical history.    Social History   Substance Use Topics     Smoking status: Never Smoker     Smokeless tobacco: Never Used     Alcohol use No     Family History   Problem Relation Age of Onset     C.A.D. Father      C.A.D. Daughter          Current Outpatient Prescriptions   Medication Sig Dispense Refill     citalopram (CELEXA) 10 MG tablet Take 1 tablet (10 mg) by mouth daily 90 tablet 3     amLODIPine (NORVASC) 5 MG tablet Take 1 tablet (5 mg) by mouth 2 times daily 90 tablet 1     furosemide (LASIX) 20 MG tablet Take 1 tablet (20 mg) by mouth daily 60 tablet 5     metoprolol (LOPRESSOR) 25 MG tablet Take 3 tablets (75 mg) by mouth 2 times daily 180 tablet 3     albuterol (PROAIR HFA/PROVENTIL HFA/VENTOLIN HFA) 108 (90 BASE) MCG/ACT Inhaler Inhale 2 puffs into the lungs every 6 hours as needed  "for shortness of breath / dyspnea or wheezing 3 Inhaler 1     hydrALAZINE (APRESOLINE) 50 MG tablet Take 1 tablet (50 mg) by mouth 3 times daily 270 tablet 3     order for DME Equipment being ordered: ABBY hose,  1pair 1 Device 0     Allergies   Allergen Reactions     Dust Mites      Influenza A, H1n1 Nausea and Vomiting     With hives and past out     Strawberry      Terazosin Hives     BP Readings from Last 3 Encounters:   03/21/17 158/80   02/13/17 160/78   02/08/17 156/78    Wt Readings from Last 3 Encounters:   03/21/17 141 lb (64 kg)   02/13/17 157 lb (71.2 kg)   02/08/17 159 lb 14.4 oz (72.5 kg)            Reviewed and updated as needed this visit by clinical staff  Tobacco  Allergies  Med Hx  Surg Hx  Fam Hx  Soc Hx      Reviewed and updated as needed this visit by Provider         ROS:  C: NEGATIVE for fever, chills, change in weight  E/M: NEGATIVE for ear, mouth and throat problems  CV: NEGATIVE for chest pain, palpitations or peripheral edema  GI: NEGATIVE for nausea, abdominal pain, heartburn, or change in bowel habits  M: NEGATIVE for significant arthralgias or myalgia  H: NEGATIVE for bleeding problems  P: NEGATIVE for changes in mood or affect    OBJECTIVE:                                                    /80  Pulse 60  Temp 98.7  F (37.1  C) (Oral)  Ht 4' 11.5\" (1.511 m)  Wt 120 lb 11.2 oz (54.7 kg)  SpO2 98%  BMI 23.97 kg/m2  There is no height or weight on file to calculate BMI.  GENERAL: alert and no distress  EYES: Eyes grossly normal to inspection, extraocular movements - intact, and PERRL  HENT: ear canals- normal; TMs- normal; Nose- normal; Mouth- no ulcers, no lesions  NECK: no tenderness, no adenopathy, no asymmetry, no masses, no stiffness; thyroid- normal to palpation  RESP: lungs clear to auscultation - no rales, no rhonchi, no wheezes  CV: regular rates and rhythm, normal S1 S2, no S3 or S4 and no click or rub   MS: extremities- no gross deformities noted, trace " edema  SKIN: mild erythema to RLE but not obvious cellulitic changes or warmth.  PSYCH: Alert and oriented times 3; speech- coherent , normal rate and volume; able to articulate logical thoughts, able to abstract reason, no tangential thoughts, no hallucinations or delusions, affect- normal       ASSESSMENT/PLAN:                                                      (I10) Essential hypertension, benign  (primary encounter diagnosis)  Comment: appears stable on therapy  Plan: CARE COORDINATION REFERRAL            (R21) Rash  Comment: no cellulitic changes noted, holding abx therapy  Plan: Basic metabolic panel            (I27.2) Pulmonary hypertension (H)  Comment: edema much better, weight down, cut back Lasix to Qday dosing  Plan: CARE COORDINATION REFERRAL        Check BMP      See Patient Instructions    Jeff Lara MD  [facility]    THE MEDICATION LIST HAS BEEN FULLY RECONCILED BY THE M.D. AND THE NURSING STAFF.

## 2017-04-28 NOTE — PROGRESS NOTES
Clinic Care Coordination Contact  OUTREACH    Referral Information:  Referral Source: PCP  Reason for Contact: Hao expressed frustration that at his OV yesterday he still was not understanding why his mom needs UMA. They asked him to ask the PCP what his reasons were. Today, KHOA sent message to RN Triage pool asking them to please fax Mery's problem list, meds, and vaccination records to [facility]. KHOA also called and left a voice mail at [facility] explaining frailty of aging, see problem list and medications that are being faxed, and, in laymen's terms, fluctuating blood pressures. KHOA also explained per son (not per PCP) the edema/lymphedema has cleared up.     KHOA will call both parties again on Monday: TONIE ([facility]) and Hao to see if fax came through and TONIE has a better understanding why pt needs UMA. KHOA apologized for the frustration. Hao admits he is struggling with the reality mom needs UMA; this makes him very sad.    Soraida Gan [facility] [place]  Clinic Care Coordinator-  Clinics: [place], [place], [place]  E-Mail: [email or web address]  Telephone: [phone]

## 2017-04-28 NOTE — TELEPHONE ENCOUNTER
Patient son Hao is requesting care coordination to call him.  He would like to know the next steps on how to get his mother into an assisted living facility.  He has been looking at [facility].  Reported he talked with you on 4/26/17, but hasn't heard back since.  Please advise.  Thank you.

## 2017-04-28 NOTE — TELEPHONE ENCOUNTER
SW addressing.  Soraida Gan [facility]  Clinic Care Coordinator-  Clinics: [place], [place], [place]  E-Mail: [email or web address]  Telephone: [phone]

## 2017-04-30 NOTE — ED AVS SNAPSHOT
Emergency Department    [address] [facility] [address]    Phone:  [phone]    Fax:  [phone]                                       Magy Askew   MRN: [id number]    Department:   Emergency Department   Date of Visit:  4/30/2017           Patient Information     Date Of Birth          10/11/1923        Your diagnoses for this visit were:     Streptococcal pharyngitis        You were seen by Bruno Ellsworth DO.      Follow-up Information     Follow up with Jeff Lara MD In 2 days.    Specialty:  Internal Medicine    Contact information:    [facility]  [address] [facility] [address] [phone]          Follow up with  Emergency Department.    Specialty:  EMERGENCY MEDICINE    Why:  If symptoms worsen    Contact information:    [address] [facility] [id number] [phone]        Discharge Instructions         Pharyngitis: Strep (Confirmed)     You have had a positive test for strep throat. Strep throat is a contagious illness. It is spread by coughing, kissing or by touching others after touching your mouth or nose. Symptoms include throat pain which is worse with swallowing, aching all over, headache and fever. It is treated with antibiotic medication. This should help you start to feel better within 1-2 days.  Home care    Rest at home. Drink plenty of fluids to avoid dehydration.    No work or school for the first 2 days of taking the antibiotics. After this time, you will not be contagious. You can then return to school or work if you are feeling better.     The antibiotic medication must be taken for the full 10 days, even if you feel better. This is very important to ensure the infection is treated. It is also important to prevent drug-resistent organisms from developing. If you were given an antibiotic shot, no more antibiotics are needed.    You may use acetaminophen (Tylenol) or ibuprofen (Motrin, Advil) to  control pain or fever, unless another medicine was prescribed for this. (NOTE: If you have chronic liver or kidney disease or ever had a stomach ulcer or GI bleeding, talk with your doctor before using these medicines.)    Throat lozenges or sprays (such as Chloraseptic) help reduce pain. Gargling with warm salt water will also reduce throat pain. Dissolve 1/2 teaspoon of salt in 1 glass of warm water. This may be useful just before meals.     Soft foods are okay. Avoid salty or spicy foods.  Follow-up care  Follow up with your healthcare provider or our staff if you are not improving over the next week.  When to seek medical advice  Call your healthcare provider right away if any of these occur:    Fever as directed by your doctor     New or worsening ear pain, sinus pain, or headache    Painful lumps in the back of neck    Stiff neck    Lymph nodes are getting larger or becoming soft in the middle    Inability to swallow liquids, excessive drooling, or inability to open mouth wide due to throat pain    Signs of dehydration (very dark urine or no urine, sunken eyes, dizziness)    Trouble breathing or noisy breathing    Muffled voice    New rash    8525-2749 [employer]. [address] [place] [address]. All rights reserved. This information is not intended as a substitute for professional medical care. Always follow your healthcare professional's instructions.      Return to the emergency department or seek medical care as instructed if your symptoms fail to improve or significantly worsen.    Take Tylenol and/or ibuprofen as needed for symptom/pain relief; use as directed.    Take antibiotics as prescribed; complete entire course as directed.    Follow-up as indicated on page 1.  Maintain adequate hydration and get plenty of rest.      24 Hour Appointment Hotline       To make an appointment at any [facility], call [phone] ([phone]). If you don't have a family doctor or  clinic, we will help you find one. [facility] are conveniently located to serve the needs of you and your family.             Review of your medicines      START taking        Dose / Directions Last dose taken    amoxicillin 500 MG capsule   Commonly known as:  AMOXIL   Dose:  500 mg   Quantity:  10 capsule        Take 1 capsule (500 mg) by mouth daily   Refills:  0          Our records show that you are taking the medicines listed below. If these are incorrect, please call your family doctor or clinic.        Dose / Directions Last dose taken    albuterol 108 (90 BASE) MCG/ACT Inhaler   Commonly known as:  PROAIR HFA/PROVENTIL HFA/VENTOLIN HFA   Dose:  2 puff   Quantity:  3 Inhaler        Inhale 2 puffs into the lungs every 6 hours as needed for shortness of breath / dyspnea or wheezing   Refills:  1        amLODIPine 5 MG tablet   Commonly known as:  NORVASC   Dose:  5 mg   Quantity:  90 tablet        Take 1 tablet (5 mg) by mouth 2 times daily   Refills:  1        citalopram 10 MG tablet   Commonly known as:  celeXA   Dose:  10 mg   Quantity:  90 tablet        Take 1 tablet (10 mg) by mouth daily   Refills:  3        furosemide 20 MG tablet   Commonly known as:  LASIX   Dose:  20 mg   Quantity:  60 tablet        Take 1 tablet (20 mg) by mouth daily   Refills:  5        hydrALAZINE 50 MG tablet   Commonly known as:  APRESOLINE   Dose:  50 mg   Quantity:  270 tablet        Take 1 tablet (50 mg) by mouth 3 times daily   Refills:  3        metoprolol 25 MG tablet   Commonly known as:  LOPRESSOR   Dose:  75 mg   Quantity:  180 tablet        Take 3 tablets (75 mg) by mouth 2 times daily   Refills:  3        order for DME   Quantity:  1 Device        Equipment being ordered: ABBY hose,  1pair   Refills:  0                Prescriptions were sent or printed at these locations (1 Prescription)                   Other Prescriptions                Printed at Department/Unit printer (1 of 1)         amoxicillin (AMOXIL)  500 MG capsule                Procedures and tests performed during your visit     Basic metabolic panel    CBC with platelets + differential    Erythrocyte sedimentation rate auto    Rapid strep screen      Orders Needing Specimen Collection     None      Pending Results     No orders found from 4/28/2017 to 5/1/2017.            Pending Culture Results     No orders found from 4/28/2017 to 5/1/2017.            Test Results From Your Hospital Stay        4/30/2017  6:55 PM      Component Results     Component    Specimen Description    Throat    Rapid Strep A Screen (Abnormal)    POSITIVE: Group A Streptococcal antigen detected by immunoassay.    Micro Report Status    FINAL 04/30/2017 4/30/2017  6:48 PM      Component Results     Component Value Ref Range & Units Status    WBC 14.4 (H) 4.0 - 11.0 10e9/L Final    RBC Count 3.90 3.8 - 5.2 10e12/L Final    Hemoglobin 11.6 (L) 11.7 - 15.7 g/dL Final    Hematocrit 34.7 (L) 35.0 - 47.0 % Final    MCV 89 78 - 100 fl Final    MCH 29.7 26.5 - 33.0 pg Final    MCHC 33.4 31.5 - 36.5 g/dL Final    RDW 13.0 10.0 - 15.0 % Final    Platelet Count 270 150 - 450 10e9/L Final    Diff Method Automated Method  Final    % Neutrophils 89.0 % Final    % Lymphocytes 4.9 % Final    % Monocytes 5.4 % Final    % Eosinophils 0.0 % Final    % Basophils 0.3 % Final    % Immature Granulocytes 0.4 % Final    Nucleated RBCs 0 0 /100 Final    Absolute Neutrophil 12.8 (H) 1.6 - 8.3 10e9/L Final    Absolute Lymphocytes 0.7 (L) 0.8 - 5.3 10e9/L Final    Absolute Monocytes 0.8 0.0 - 1.3 10e9/L Final    Absolute Eosinophils 0.0 0.0 - 0.7 10e9/L Final    Absolute Basophils 0.0 0.0 - 0.2 10e9/L Final    Abs Immature Granulocytes 0.1 0 - 0.4 10e9/L Final    Absolute Nucleated RBC 0.0  Final         4/30/2017  7:03 PM      Component Results     Component Value Ref Range & Units Status    Sodium 139 133 - 144 mmol/L Final    Potassium 3.3 (L) 3.4 - 5.3 mmol/L Final    Chloride 104 94 - 109 mmol/L  Final    Carbon Dioxide 23 20 - 32 mmol/L Final    Anion Gap 12 3 - 14 mmol/L Final    Glucose 176 (H) 70 - 99 mg/dL Final    Urea Nitrogen 50 (H) 7 - 30 mg/dL Final    Creatinine 2.76 (H) 0.52 - 1.04 mg/dL Final    GFR Estimate 16 (L) >60 mL/min/1.7m2 Final    Non  GFR Calc    GFR Estimate If Black 19 (L) >60 mL/min/1.7m2 Final    African American GFR Calc    Calcium 8.4 (L) 8.5 - 10.1 mg/dL Final         4/30/2017  6:58 PM      Component Results     Component Value Ref Range & Units Status    Sed Rate 101 (H) 0 - 30 mm/h Final                Clinical Quality Measure: Blood Pressure Screening     Your blood pressure was checked while you were in the emergency department today. The last reading we obtained was  BP: (!) 227/105 . Please read the guidelines below about what these numbers mean and what you should do about them.  If your systolic blood pressure (the top number) is less than 120 and your diastolic blood pressure (the bottom number) is less than 80, then your blood pressure is normal. There is nothing more that you need to do about it.  If your systolic blood pressure (the top number) is 120-139 or your diastolic blood pressure (the bottom number) is 80-89, your blood pressure may be higher than it should be. You should have your blood pressure rechecked within a year by a primary care provider.  If your systolic blood pressure (the top number) is 140 or greater or your diastolic blood pressure (the bottom number) is 90 or greater, you may have high blood pressure. High blood pressure is treatable, but if left untreated over time it can put you at risk for heart attack, stroke, or kidney failure. You should have your blood pressure rechecked by a primary care provider within the next 4 weeks.  If your provider in the emergency department today gave you specific instructions to follow-up with your doctor or provider even sooner than that, you should follow that instruction and not wait for  "up to 4 weeks for your follow-up visit.        Thank you for choosing [facility]       Thank you for choosing [facility] for your care. Our goal is always to provide you with excellent care. Hearing back from our patients is one way we can continue to improve our services. Please take a few minutes to complete the written survey that you may receive in the mail after you visit with us. Thank you!        [facility] Information     [employer] lets you send messages to your doctor, view your test results, renew your prescriptions, schedule appointments and more. To sign up, go to [email or web address] . Click on \"Log in\" on the left side of the screen, which will take you to the Welcome page. Then click on \"Sign up Now\" on the right side of the page.     You will be asked to enter the access code listed below, as well as some personal information. Please follow the directions to create your username and password.     Your access code is: [email or web address]  Expires: 2017 10:50 AM     Your access code will  in 90 days. If you need help or a new code, please call your [facility] clinic or [phone].        Care EveryWhere ID     This is your Care EveryWhere ID. This could be used by other organizations to access your [facility] medical records  [id number]        After Visit Summary       This is your record. Keep this with you and show to your community pharmacist(s) and doctor(s) at your next visit.                  "

## 2017-04-30 NOTE — ED PROVIDER NOTES
"  History     Chief Complaint:  Pharyngitis (started today with left ear pain and pain with swallowing)      MOISES Askew (likes to go by Mery) is a 89 year old female who presents with her son, Hao, for evaluation of painful swallowing and left ear pain. The patient reports pain to her left throat and says it \"hurts very much\" to swallow. Currently the patient rates her pain as 10/10 and describes it as a \"steady pain that don't quit.\" She describes the pain as throbbing and steady in nature. She has not taken anything for pain control. She was able to eat a piece of toast this morning, stating \"I could hardly chew it\" due to pain. The pain is also exacerbated by moving her jaw up and down. She reports that Tylenol is not helpful for any pain. She denies any significant headache. Her son reports that the patient has had a natural tremor throughout her body for a long time.     She and her son note that about 8-12 months ago she began to develop chronic neck pain, which she has been struggling with since. She states that she was told this was due to arthritis.      Her son, reports that the patient was just seen in clinic three days ago for recheck of lymphedema and redness to her right lower leg. She does take a water pill. Otherwise the patient denies any fever, nausea, vomiting, cough, or any other physical complaints at this time.       Allergies:  Dust mites  Influenza A, H1n1 - nausea, vomiting with hives and syncope  Strawberry  Terazosin - hives     Medications:    Albuterol (PROAIR HFA/PROVENTIL HFA/VENTOLIN HFA) 108 (90 BASE) MCG/ACT Inhaler   Amlodipine (NORVASC) 5 MG tablet   Citalopram (CELEXA) 10 MG tablet   Furosemide (LASIX) 20 MG tablet   Hydralazine (APRESOLINE) 50 MG tablet   Metoprolol (LOPRESSOR) 25 MG tablet     Past Medical History:    Anxiety   Cardiovascular screening; ldl goal less than 130   Cellulitis of right leg   CKD (chronic kidney disease) stage 4, gfr 15-29 ml/min (h) " "  Essential hypertension, benign   Lymphedema of both lower extremities   Myalgia   Pulmonary hypertension (h)   Stasis dermatitis of both legs     Past Surgical History:    The patient does not have any past pertinent surgical history.     Family History:  Father - CAD  Daughter - CAD    Social History:  Marital status:   Tobacco use: never  Alcohol use: no  Patient presents to the ED with her son.     Patient lives in independent senior living. She lives alone.  Her PCP is Dr. Jeff Lara.     Review of Systems   Constitutional: Positive for appetite change (decreased secondary to pain with chewing and swallowing). Negative for fever.   HENT: Positive for ear pain (left), sore throat (left side) and trouble swallowing (secondary to pain).    Respiratory: Negative for cough.    Gastrointestinal: Negative for nausea and vomiting.   Musculoskeletal: Positive for neck pain (chronic).   Neurological: Positive for tremors (at baseline). Negative for headaches.   All other systems reviewed and are negative.    Physical Exam   First Vitals:  BP: (!) 207/108  Pulse: 85  Temp: 98.1  F (36.7  C)  Resp: 16  Height: 149.9 cm (4' 11\")  Weight: 54.4 kg (120 lb)  SpO2: 97 %    Physical Exam  General: Alert and cooperative with exam. Patient in moderate distress. Normal mentation.  Head:  Scalp is NC/AT  Eyes:  No scleral icterus, PERRL,   ENT:  The external nose is normal. TM's clear bilaterally. Mild posterior oropharyngeal erythema; mucus membranes are moist. Uvula midline, no evidence of deep space infection.  Neck:  Normal range of motion without rigidity. Moderate left-sided anterior lymphadenopathy.  CV:  Regular rate and rhythm    No pathologic murmur   Resp:  Breath sounds are clear bilaterally    Non-labored, no retractions or accessory muscle use  GI:  Abdomen is soft, no distension, no tenderness. No peritoneal signs  MS:  No lower extremity edema   Skin:  Warm and dry, No rash or lesions " noted.  Neuro: Oriented x 3. No gross motor deficits.     Emergency Department Course     Laboratory:  BMP: K 3.3 (L), Glucose 176 (H), BUN 50 (H), Cr 2.76 (H), GFR 16 (L), Ca 8.4 (L), o/w WNL  CBC: WBC 14.4 (H), HGB 11.6 (L), HCT 34.7 (L), o/w WNL ()     SED Rate: 101 (H)    RSS: Throat specimen Positive (A)    Interventions:  1823 Ibuprofen 600 mg, PO  2014 Amoxicillin 500 mg PO    Emergency Department Course:  1808 Nursing notes and vitals reviewed. I obtained a history from the patient. I performed an exam of the patient as documented above. We discussed the plan of care including laboratory studies.     1933 Recheck. The patient and her son were updated and all questions were answered. They verbalized understanding and agreement.     I personally reviewed the laboratory results with the Patient and son and answered all related questions prior to discharge.      Findings and plan explained to the Patient and son. Patient discharged home with instructions regarding supportive care, medications, and reasons to return. The importance of close follow-up was reviewed. The patient was prescribed Amoxicillin 500 mg daily x 10 days.    Impression & Plan      Medical Decision Making:  The patient is a 89 year old female who presents with one day history of pharyngitis and jaw/left ear pain. The patient's medical history and records were reviewed. Initial consideration for, but not limited to, otitis media/externa, TMJ syndrome, pharyngitis (bacterial vs viral), atypical presentation of temporal arteritis, other infectious process, amongst others. Labs were obtained and demonstrate a mildly elevated white count (14.4), chronic renal insufficiency (Cr 2.76), and significant elevated SED rate (101). Additionally, the patient tested positive for group A strep. Ear exam demonstrates no significant evidence of otitis media. The patient's clinical presentation and physical exam is consistent with strep throat. There is  no evidence of deep space infection on exam. The patient was provided an initial dose of Amoxicillin in the ED. She was prescribed a 10 day course of Amoxicillin which was renally adjusted. The patient was instructed to follow up in two days with PCP for reevaluation. Recommended Tylenol/Ibuprofen for pain control. Additional supportive care measures were discussed. Return precautions were discussed. At the time of discharge, the patient was hemodynamically stable, neurologically intact, and afebrile. Of note, the patient did have significantly elevated blood pressure here in the ED. Recommended taking her previously prescribed antihypertensive medications upon returning home, as the patient states she did not take them this morning.     Diagnosis:    ICD-10-CM    1. Streptococcal pharyngitis J02.0        Disposition:  discharged to home    Discharge Medications:  Discharge Medication List as of 4/30/2017  8:19 PM      START taking these medications    Details   amoxicillin (AMOXIL) 500 MG capsule Take 1 capsule (500 mg) by mouth daily, Disp-10 capsule, R-0, Local Print           IJuanita, cata serving as a scribe on 4/30/2017 at 6:08 PM to personally document services performed by Bruno Brar DO, based on my observations and the provider's statements to me.      Juanita Collier  4/30/2017    EMERGENCY DEPARTMENT       Bruno Ellsworth DO  05/01/17 0131

## 2017-04-30 NOTE — ED AVS SNAPSHOT
Emergency Department    [address] [place] [id number]    Phone:  [phone]    Fax:  [phone]                                       Magy Askew   MRN: [id number]    Department:   Emergency Department   Date of Visit:  4/30/2017           After Visit Summary Signature Page     I have received my discharge instructions, and my questions have been answered. I have discussed any challenges I see with this plan with the nurse or doctor.    ..........................................................................................................................................  Patient/Patient Representative Signature      ..........................................................................................................................................  Patient Representative Print Name and Relationship to Patient    ..................................................               ................................................  Date                                            Time    ..........................................................................................................................................  Reviewed by Signature/Title    ...................................................              ..............................................  Date                                                            Time

## 2017-05-01 NOTE — PROGRESS NOTES
Clinic Care Coordination Contact  [facility]/Voicemail    Referral Source: PCP  Clinical Data: Care Coordinator Outreach  Outreach attempted x 1.  Left message on voicemail with call back information and requested return call.  Plan: Care Coordinator spoke with Anel at [facility]; she rec'd the information from the clinic: Vaccination list, Problem list, and Medication list. There is 1 new med: amoxicillin prescribed in ED over the weekend. Anel will have her nurse start assessing Mery for admission. Hao needs to call Anel to schedule a tour at [facility] and probably make a down payment to save Mery an apt.    Care Coordinator will try to reach patient again in 1-2 business days.    Soraida Gan [facility]  Clinic Care Coordinator-  Clinics: [place], [place], [place]  E-Mail: [email or web address]  Telephone: [phone]

## 2017-05-01 NOTE — DISCHARGE INSTRUCTIONS
Pharyngitis: Strep (Confirmed)     You have had a positive test for strep throat. Strep throat is a contagious illness. It is spread by coughing, kissing or by touching others after touching your mouth or nose. Symptoms include throat pain which is worse with swallowing, aching all over, headache and fever. It is treated with antibiotic medication. This should help you start to feel better within 1-2 days.  Home care    Rest at home. Drink plenty of fluids to avoid dehydration.    No work or school for the first 2 days of taking the antibiotics. After this time, you will not be contagious. You can then return to school or work if you are feeling better.     The antibiotic medication must be taken for the full 10 days, even if you feel better. This is very important to ensure the infection is treated. It is also important to prevent drug-resistent organisms from developing. If you were given an antibiotic shot, no more antibiotics are needed.    You may use acetaminophen (Tylenol) or ibuprofen (Motrin, Advil) to control pain or fever, unless another medicine was prescribed for this. (NOTE: If you have chronic liver or kidney disease or ever had a stomach ulcer or GI bleeding, talk with your doctor before using these medicines.)    Throat lozenges or sprays (such as Chloraseptic) help reduce pain. Gargling with warm salt water will also reduce throat pain. Dissolve 1/2 teaspoon of salt in 1 glass of warm water. This may be useful just before meals.     Soft foods are okay. Avoid salty or spicy foods.  Follow-up care  Follow up with your healthcare provider or our staff if you are not improving over the next week.  When to seek medical advice  Call your healthcare provider right away if any of these occur:    Fever as directed by your doctor     New or worsening ear pain, sinus pain, or headache    Painful lumps in the back of neck    Stiff neck    Lymph nodes are getting larger or becoming soft in the  middle    Inability to swallow liquids, excessive drooling, or inability to open mouth wide due to throat pain    Signs of dehydration (very dark urine or no urine, sunken eyes, dizziness)    Trouble breathing or noisy breathing    Muffled voice    New rash    1412-8451 [employer]. [address] [place] [address]. All rights reserved. This information is not intended as a substitute for professional medical care. Always follow your healthcare professional's instructions.      Return to the emergency department or seek medical care as instructed if your symptoms fail to improve or significantly worsen.    Take Tylenol and/or ibuprofen as needed for symptom/pain relief; use as directed.    Take antibiotics as prescribed; complete entire course as directed.    Follow-up as indicated on page 1.  Maintain adequate hydration and get plenty of rest.

## 2017-05-11 NOTE — PROGRESS NOTES
"Clinic Care Coordination Contact  OUTREACH    Referral Information:  Referral Source: PCP  Reason for Contact: Follow up: Hao toured [facility]; thought it looked like a _____ _____  home. Hao and eMry have decided to secure an Independent Sr. Apt at [facility]. Hao stated Mery is having incontinence issues-may need to\" bump up\" to custodial level of care; which she can do at [facility].  Care Conference: No     Universal Utilization:   ED Visits in last year: 2  Hospital visits in last year: 1  Last PCP appointment: 17  Missed Appointments:  (0)  Concerns:  (Open to [facility] RN PT Lymphedema)  Multiple Providers or Specialists: 0    Functional Status:  Mobility Status: Independent  Equipment Currently Used at Home: None  Transportation: Hao           Psychosocial:  Current living arrangement:: I live alone ([facility]) rent is going aaron high, so I want to move.  Financial/Insurance: limited       Resources and Interventions:  Current Resources: Assisted Living (na);  (na)  PAS Number:  (na)  Senior Linkage Line Referral Placed:  (na)  Advanced Care Plans/Directives on file:: No  Referrals Placed:  (na)     Goals:   Goal 1 Statement:  (Hao and KHOA discussed custodial for Magy)  Goal 1 Progression Percent: 0% (see note, going to Independent Sr Apt [facility])  Goal 1 Progression Date: 17      Barriers: Mery makes her decision based on her small savings; does not it to all go towards her health care needs (UMA's are expensive)  Strengths: Shares her thoughts and feelings with hao who is her main support person.  Patient/Caregiver understanding: ok  Frequency of Care Coordination:  (As Needed)  Upcoming appointment: 17     Plan: KHOA will hope Hao can get his mom in to clinic May 29th on a day KHOA is on site so we can all meet together again with PCP.    Soraida Gan [facility]  Clinic Care Coordinator-  Clinics: [facility], " [place]  E-Mail: [email or web address]  Telephone: [phone]

## 2017-06-14 NOTE — LETTER
[facility] Home  Complex Care Plan  About Me  Patient Name:  Magy Askew    YOB: 1923  Age:   89 year old   [place] MRN: [id number] Telephone Information:     Home Phone [phone]   Mobile Not on file.       Address:    [facility] [address]  [place] [place] [place] [address] Email address:  No e-mail address on record      Emergency Contact(s)  Name Relationship Lgl Grd Work Phone Home Phone Mobile Phone   PITER RODRIGUEZ Son No  [phone]           Primary language:  English     needed? No   [place] [employer] [facility]:  [phone] op. 1  Other communication barriers: No  Preferred Method of Communication:  Letter, Phone  Current living arrangement: I live alone ([facility])  Mobility Status/ Medical Equipment: Independent  Other information to know about me:    Health Maintenance  Health Maintenance Reviewed:      My Access Plan  Medical Emergency 911   Primary Clinic Line [facility]- [phone]   24 Hour Appointment Line [phone] or  [phone] ([phone]) (toll-free)   24 Hour Nurse Line [phone] (toll-free)   Preferred Urgent Care [facility], [phone]   Preferred Hospital [facility]  [phone]   Preferred Pharmacy [place], [place] - [address] [place] [address]     Behavioral Health Crisis Line [facility], [phone] or 911     My Care Team Members  Patient Care Team       Relationship Specialty Notifications Start End    Jeff Lara MD PCP - General Internal Medicine  11/26/12     Phone: [phone] Fax: [phone]         [facility] [address] [facility] [address]    Jossie Gan LSW  Clinic Admissions 2/8/17     Phone: [phone] Fax: [phone]         [facility] Nurse HOME HEALTH AGENCY ([facility] ([place])  2/8/17     Comment:  RN/PT     Phone: [phone]               My Care Plans  Self Management and Treatment Plan  Goals and (Comments)  Goal #1:  (Move mom to [facility] where her cares can increase)      70% of goal reached    Goal #2:  (I will help mom complete HCD)      30% of goal reached      Action Plans on File: None  Advance Care Plans/Directives Type:   Type Advanced Care Plans/Directives:  (na)    My Medical and Care Information  Problem List   Patient Active Problem List   Diagnosis     Essential hypertension, benign     CARDIOVASCULAR SCREENING; LDL GOAL LESS THAN 130     ACP (advance care planning)     Myalgia     CKD (chronic kidney disease) stage 4, GFR 15-29 ml/min (H)     Cellulitis of right leg     Anxiety     Pulmonary hypertension (H)     Stasis dermatitis of both legs     Lymphedema of both lower extremities      Current Medications and Allergies:  See printed Medication Report.    Care Coordination Start Date: 04/24/17   Frequency of Care Coordination:  (As Needed)   Form Last Updated: 06/14/2017

## 2017-06-14 NOTE — PROGRESS NOTES
Clinic Care Coordination Contact  OUTREACH    Referral Information:  Referral Source: PCP  Reason for Contact: Follow up  Care Conference: No     Universal Utilization:   ED Visits in last year: 2  Hospital visits in last year: 1  Last PCP appointment: 05/29/17  Missed Appointments:  (0)  Concerns:  (Open to [facility] RN PT Lymphedema)  Multiple Providers or Specialists: 0    Clinical Concerns:  Current Medical Concerns: See Problem List    Current Behavioral Concerns: 0    Education Provided to patient: Honoring Choice Packet (Hao)   Clinical Pathway Name: None  Clinical Pathway: None    Medication Management:  self     Functional Status:  Mobility Status: Independent  Equipment Currently Used at Home: other (see comments) (not assessed)  Transportation: Hao           Psychosocial:  Current living arrangement:: I live alone ([facility])  Financial/Insurance: no concerns       Resources and Interventions:  Current Resources:  ([facility])  PAS Number:  (na)  Advanced Care Plans/Directives on file:: Yes POLST and HCD in process       Goals:   Goal 1 Statement:  (Move mom to [facility] where her cares can increase)  Goal 1 Progression Percent: 70%  Goal 1 Progression Date: 06/14/17  Goal 2 Statement:  (I will help mom complete HCD)  Goal 2 Progression Percent: 30%  Goal 2 Progression Date: 06/14/17              Barriers: Hao also has health issues but tries to see Mery often;   Strengths: Hao is happy to help his mom complete a HCD.  Patient/Caregiver understanding: good  Frequency of Care Coordination:  (As Needed)  Upcoming appointment:  (none)     Plan: SW mailing updated goals and blank Honoring Choice HCD packet. Hao and his [employer] friends are helping mom move into [facility]. An Independent Sr, Apt.    Soraida Gan [facility]  Clinic Care Coordinator-  Clinics: [place], [place], [place]  E-Mail:  [email or web address]  Telephone: [phone]

## 2017-06-29 NOTE — TELEPHONE ENCOUNTER
metoprolol (LOPRESSOR) 25 MG tablet      Last Written Prescription Date: 2/09/2017  Last Fill Quantity: 180, # refills: 3    Last Office Visit with G, [facility] or [facility] prescribing provider:  4/27/2017   Future Office Visit:        BP Readings from Last 3 Encounters:   04/30/17 (!) 227/105   04/27/17 158/80   03/21/17 158/80

## 2017-07-10 NOTE — PROGRESS NOTES
Clinic Care Coordination Contact    Situation: Patient chart reviewed by care coordinator.    Background: PCP would like to see Mery move into an UMA. Mery insists she is managing fine in an apt. She is taking a baby step by moving to Independent Senior Apt at [facility] where she can increase he level of care within their campus.    Assessment: Son is attentive. Mery needs a formal HCD on file. Mery has fluctuating BP, lymphedema, anxiety, CKD, pulmonary HTN and other dx's.    Plan/Recommendations: Follow up on HCD status after Mery has time to settle in at [facility] (move is July 30th)    Soraida Gan [facility]  Clinic Care Coordinator-  Clinics: [place], [place], [place]  E-Mail: [email or web address]  Telephone: [phone]

## 2017-07-11 NOTE — TELEPHONE ENCOUNTER
amLODIPine (NORVASC) 5 MG tablet      Last Written Prescription Date: 3/17/2017  Last Fill Quantity: 90, # refills: 1    Last Office Visit with [facility], [facility] or [facility] prescribing provider:  4/27/2017   Future Office Visit:        BP Readings from Last 3 Encounters:   04/30/17 (!) 227/105   04/27/17 158/80   03/21/17 158/80

## 2017-07-11 NOTE — TELEPHONE ENCOUNTER
Script request was filled for Amlodipine but need to verify with patient if she was still on medication or if held or d'emmanuel in past

## 2017-07-31 NOTE — PROGRESS NOTES
SW spoke with [facility]. They do not assist families to move into higher level of care. Spoke with Powers MARCI Martines-she will out reach to Hao tomorrow and introduce him to a  Out Reach worker who will help him apply for MA/waivered services for Mery.    Soraida Gan [facility]  Clinic Care Coordinator-  Clinics: [place], [place], [place]  E-Mail: [email or web address]  Telephone: [phone]
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
## 2017-08-11 NOTE — TELEPHONE ENCOUNTER
Amlodipine was refilled on 7/11/17 with 90 tabs and 1 refill.    Lasix      Last Written Prescription Date: 2/22/17  Last Fill Quantity: 60, # refills: 5  Last Office Visit with [facility], [facility] or [facility] prescribing provider: 4/27/17       Potassium   Date Value Ref Range Status   04/30/2017 3.3 (L) 3.4 - 5.3 mmol/L Final     Creatinine   Date Value Ref Range Status   04/30/2017 2.76 (H) 0.52 - 1.04 mg/dL Final     BP Readings from Last 3 Encounters:   04/30/17 (!) 227/105   04/27/17 158/80   03/21/17 158/80     Routing refill request to provider for review/approval because:  Abnormal lab results since last refill.
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
## 2017-09-14 NOTE — PROGRESS NOTES
Clinic Care Coordination Contact  OUTREACH    Referral Information:  Referral Source: PCP  Reason for Contact: follow up to see how care conference with facility went for mom  Care Conference: with facility     Universal Utilization:   ED Visits in last year: 2  Hospital visits in last year: 1  Last PCP appointment: 05/29/17  Missed Appointments:  (0)  Concerns:  (Open to [facility] RN PT Lymphedema)  Multiple Providers or Specialists: 0    Clinical Concerns:  Current Medical Concerns: see problem list    Current Behavioral Concerns: 0    Education Provided to patient: Hao and I discussed the UMA; mom afrees for the 1st time that she would be better off in the UMA side of complex she lives in. (is 2nd on the waiting list)   Clinical Pathway Name: None  Clinical Pathway: None    Medication Management:  Self/son     Functional Status:  Mobility Status: Independent  Equipment Currently Used at Home: other (see comments) (not assessed)  Transportation: son Hao           Psychosocial:  Current living arrangement:: I live alone [facility]  Financial/Insurance: minimal       Resources and Interventions:  Current Resources: Assisted Living (na);  ([facility])  PAS Number:  (na)  Senior Linkage Line Referral Placed:  (na)  Advanced Care Plans/Directives on file:: No  Referrals Placed:  (na)     Goals:   Goal 1 Statement:  (Move mom to [facility] where her cares can increase)  Goal 1 Progression Percent: 100%  Goal 1 Progression Date: 07/31/17  Goal 2 Statement:  (I will help mom complete HCD)  Goal 2 Progression Percent: 30%  Goal 2 Progression Date: 09/14/17  Goal 3 Statement:  (I will help mom apply for county waivers for care home)  Goal 3 Progression Percent: 100% (the facility helped family)  Goal 3 Progression Date: 09/14/17     Barriers: Wanting her independence  Strengths: listened to facility staff as they described criteria for assisted living; agrees she needs more  cares  Patient/Caregiver understanding: good  Frequency of Care Coordination:  (As Needed)  Upcoming appointment:  (none)     Plan: CC SW following for support prn. Goals are met and Mery is 2nd on wait list for an UMA apartment at same location.    Soraida Gan [facility]  Clinic Care Coordinator-  Clinics: [place], [place], [place]  E-Mail: [email or web address]  Telephone: [phone]

## 2017-10-10 PROBLEM — I16.1 HYPERTENSIVE EMERGENCY: Status: ACTIVE | Noted: 2017-01-01

## 2017-10-10 NOTE — PROGRESS NOTES
Clinic Care Coordination Contact  Clinical Data: Care Coordinator Outreach  Outreach attempted with son Hao. CC SW reached Hao-his mom has a pulled muscle in her back; using OTC for pain control, will call clinic if needs to be seen. Facility has not notified him of an opening for a higher level of care yet. Hao is doing well and is available to help his mom daily if needed.  Plan: Care Coordinator will outreach again in 30 days.    Soraida Gan [facility]  Clinic Care Coordinator-  Clinics: [place], [place], [place]  E-Mail: [email or web address]  Telephone: [phone]

## 2017-10-10 NOTE — ED PROVIDER NOTES
History     Chief Complaint:  Chest Pain     HPI   Magy Askew is a 89 year old female with a history of HTN, CKD, and lymphedema who presents to the emergency department for evaluation of chest and back pain. The patient states that she has had approximately 2-3 days of constant atraumatic mid back pain, which intermittently radiates to her substernal chest. The patient notes that her back pain tends to be exacerbated by movement or walking. Her continued back pain today, along with an episode of chest pain, was concerning to her and her son and prompted their ED visit today.     Here in the ED, the patient continues to complain of back pain, though does not currently have chest pain. She denies any recent measured fevers, significant cough, abdominal pain, vomiting, diarrhea, black or bloody stools, urinary symptoms, jaw or shoulder pain, bilateral upper or lower extremity pain, numbness or weakness, or slurred speech.  The patient's son expressed some concern that the patient has had some cognitive changes as of late, noting that she has had some memory issues, and has been more hard of hearing, though denies any acutely worse confusion in the past few days. Of note, the patient has a history of hypertension and reports that she generally has a systolic reading above the 200s at baseline. She notes that she is intermittently noncompliant with her metoprolol due to the side effects of this medication.     Cardiac/PE/DVT Risk Factors:  The patient has a history of hypertension.. She reports no family history of CAD or MI. The patient denies any personal or familial history of PE, DVT, or clotting disorder. The patient reports no recent travel, surgery, or other immobilizations.     Allergies:  Terazosin    Medications:    Amlodipine  Lasix  Metoprolol  Hydralazine    Past Medical History:    hypertension  CKD  Anxiety  Lymphedema    Past Surgical History:    The patient does not have any pertinent past  "surgical history.    Family History:    CAD    Social History:  Presents with her son.   Negative for tobacco use.  Negative for alcohol use.  Marital Status:   [5]    Review of Systems   Constitutional: Negative for fever.   Respiratory: Negative for cough.    Cardiovascular: Positive for chest pain (Resolved).   Gastrointestinal: Negative for abdominal pain, blood in stool, diarrhea and vomiting.   Genitourinary: Negative for dysuria, frequency and hematuria.   Musculoskeletal: Positive for back pain.   Neurological: Negative for speech difficulty, weakness and numbness.   All other systems reviewed and are negative.    Physical Exam     Patient Vitals for the past 24 hrs:   BP Temp Temp src Pulse Heart Rate Resp SpO2 Height   10/10/17 1910 (!) 220/80 - - - - - - -   10/10/17 1850 188/70 - - 57 - - 99 % -   10/10/17 1820 (!) 240/100 - - 56 - - 98 % -   10/10/17 1814 (!) 238/100 - - - - - - -   10/10/17 1800 (!) 210/98 - - - - - - -   10/10/17 1740 (!) 238/102 - - - - - - -   10/10/17 1735 (!) 238/102 - - - 61 20 97 % -   10/10/17 1718 (!) 230/83 97.8  F (36.6  C) Temporal 59 - 15 99 % 1.549 m (5' 1\")     Physical Exam  SKIN:  Warm, dry.  HEMATOLOGIC/IMMUNOLOGIC/LYMPHATIC:  No pallor.  BLE edema.  HENT:  No JVD.  EYES:  Conjunctivae normal.  CARDIOVASCULAR:  Regular rate and rhythm.  No murmur.  RESPIRATORY:  No respiratory distress, breath sounds equal and normal.  No thoracic pain with deep inspiration.  GASTROINTESTINAL:  Soft, nontender abdomen with active bowel sounds.  No mass or distension.  MUSCULOSKELETAL:  ROM torso exacerbates the mid-back pain.  NEUROLOGIC:  Alert, conversant.  No gross motor or sensory deficit.    PSYCHIATRIC:  Normal mood.    Emergency Department Course   ECG:  Indication: Chest Pain  Time: 1718  Vent. Rate 61 bpm. AZ interval 222. QRS duration 84. QT/QTc 406/408. P-R-T axis 34 -31 96.  Sinus rhythm with 1st degree AV block. Left axis deviation. Left ventricular hypertrophy " with repolarization abnormality. Lead I, aVL subtle ST depression/ T wave inversion. Abnormal ECG. No significant change compared to EKG dated 1/9/2017. Read time: 1725    Imaging:  Radiographic findings were communicated with the patient who voiced understanding of the findings.    CT Chest w/o IV contrast:   1. Bilateral pleural effusions, moderate on the right and small on the  left. Findings may be due to congestive heart failure. Correlate  clinically.  2. Cardiomegaly and coronary artery calcifications.  3. Mild ectasia of ascending aorta. As per radiology.     Laboratory:  CBC: WBC: 6.8, HGB: 9.9 (L), PLT: 254  BMP: Glucose 114 (H), BUN 54 (H), Creatinine 4.84 (H), GFR Estimate 8 (L), o/w WNL     1755 Troponin: <0.015    Interventions:  1835 Morphine, 2 mg, IV injection  1837 Labetalol 10 mg IV    Emergency Department Course:  Nursing notes and vitals reviewed. 1726 I performed an exam of the patient as documented above.    EKG obtained in the ED, see results above. The patient was placed on continuous cardiac monitoring while here in the ED.      IV inserted. Medicine administered as documented above. Blood drawn. This was sent to the lab for further testing, results above.    The patient was sent for a CT Chest while in the emergency department, findings above.     1903 I rechecked the patient and discussed the results of her workup thus far.     1940  I consulted with Dr. Pascal of the hospitalist services. They are in agreement to accept the patient for admission.    Findings and plan explained to the Patient who consents to admission. Discussed the patient with Dr. Pascal, who will admit the patient to a [facility] bed for further monitoring, evaluation, and treatment.    Impression & Plan    Medical Decision Making:  Magy Askew is a 89 year old female who presents with mid back pain and intermittent chest discomfort. Notable, very elevated blood pressure, which sounds to be a difficult problem for her  as she is on multiple agents and given that every time she goes to the clinic, her blood pressure is over 200. This could even be close to her baseline. In this context with back pain, uncontrolled blood pressure, and intermittent chest discomfort, albeit for two days, I considered aortic pathology, such as dissection. Clinically however, she is quite well appearing and I thought it somewhat unlikely that she was suffering dissection for the past two day. Unfortunately, she is suffering acute on chronic renal failure, so contrast dye cannot be given as she has a very poor GFR. A noncontrast scan was performed, which of course is sub optimal for the aorta, but no gross acute pathology noted. It is notable for pleural effusion. This could be a function of worsening renal function with CHF. Some degree of lateral ischemic finding on EKG. Negative troponin. The patient will be admitted to try to optimize her renal function and for further investigation and treatment.     Diagnosis:    ICD-10-CM   1. Acute renal failure superimposed on chronic kidney disease, unspecified CKD stage, unspecified acute renal failure type (H) N17.9    N18.9   2. Hypertension, unspecified type I10   3. Chest pain, unspecified type R07.9   4. Thoracic back pain, unspecified back pain laterality, unspecified chronicity M54.6   5. Pleural effusion J90     Disposition:  Admitted to Dr. Pascal of the hospitalist service     I, Bell Rajput, am serving as a scribe on 10/10/2017 at 5:26 PM to personally document services performed by Jamir Wooten MD based on my observations and the provider's statements to me.     Bell Rajput  10/10/2017    EMERGENCY DEPARTMENT       Jamir Wooten MD  10/11/17 1139

## 2017-10-10 NOTE — IP AVS SNAPSHOT
MRN:[id number]                      After Visit Summary   10/10/2017    Magy Askew    MRN: [id number]           Thank you!     Thank you for choosing [facility] for your care. Our goal is always to provide you with excellent care. Hearing back from our patients is one way we can continue to improve our services. Please take a few minutes to complete the written survey that you may receive in the mail after you visit with us. Thank you!        Patient Information     Date Of Birth          10/11/1923        Designated Caregiver       Most Recent Value    Caregiver    Will someone help with your care after discharge? no      About your hospital stay     You were admitted on:  October 10, 2017 You last received care in the:  [facility] [address] [facility]    You were discharged on:  October 14, 2017       Who to Call     For medical emergencies, please call 911.  For non-urgent questions about your medical care, please call your primary care provider or clinic, [phone]          Attending Provider     Provider Specialty    Jamir Wooten MD Emergency Medicine    Anthony Pascal MD Internal Medicine       Primary Care Provider Office Phone # Fax #    Jeff Lara [phone]      After Care Instructions     Activity       Your activity upon discharge: activity as tolerated            Diet       Follow this diet upon discharge: Renal                  Follow-up Appointments     Follow-up and recommended labs and tests        [facility] to provide care and support after discharge                  Pending Results     Date and Time Order Name Status Description    10/12/2017 0701 EKG 12 lead Preliminary             Statement of Approval     Ordered          10/14/17 1239  I have reviewed and agree with all the recommendations and orders detailed in this document.  EFFECTIVE NOW     Approved and electronically signed by:  Tyler Valdivia MD         "     Admission Information     Date & Time Provider Department Dept. Phone    10/10/2017 Anthony Pascal MD [facility] [address] [facility] [phone]      Your Vitals Were     Blood Pressure Pulse Temperature Respirations Height Weight    164/76 (BP Location: Right arm) 60 97.8  F (36.6  C) (Oral) 16 1.499 m (4' 11\") 53 kg (116 lb 13.5 oz)    Pulse Oximetry BMI (Body Mass Index)                96% 23.6 kg/m2          [employer] Information     [employer] lets you send messages to your doctor, view your test results, renew your prescriptions, schedule appointments and more. To sign up, go to [email or web address] . Click on \"Log in\" on the left side of the screen, which will take you to the Welcome page. Then click on \"Sign up Now\" on the right side of the page.     You will be asked to enter the access code listed below, as well as some personal information. Please follow the directions to create your username and password.     Your access code is: [email or web address]  Expires: 2018  3:49 PM     Your access code will  in 90 days. If you need help or a new code, please call your [facility] clinic or [phone].        Care EveryWhere ID     This is your Care EveryWhere ID. This could be used by other organizations to access your [facility] medical records  [id number]        Equal Access to Services     [facility] AH: Hadii leena torreso Juan, waaxda luqadaha, qaybta kaalmada doris, sarah liang. So [facility] [phone].    ATENCIÓN: Si habla español, tiene a dooley disposición servicios gratuitos de asistencia lingüística. Llame al [phone].    We comply with applicable federal civil rights laws and [place] laws. We do not discriminate on the basis of race, color, national origin, age, disability, sex, sexual orientation, or gender identity.               Review of your medicines      START taking        Dose / Directions    atropine 1 % ophthalmic solution "   Used for:  End stage renal disease (H)        Dose:  2-4 drop   Take 2-4 drops by mouth, place under tongue or place inside cheek every 2 hours as needed for other (terminal respiratory secretions) Not for ophthalmic use.   Quantity:  5 mL   Refills:  1       bisacodyl 10 MG Suppository   Commonly known as:  DULCOLAX   Used for:  End stage renal disease (H)        Unwrap and insert 1 suppository rectally twice daily as needed for constipation.   Quantity:  12 suppository   Refills:  1       haloperidol 2 MG/ML (HIGH CONC) solution   Commonly known as:  HALDOL   Used for:  End stage renal disease (H)        Dose:  1-2 mg   Take 0.5-1 mLs (1-2 mg) by mouth, place under tongue or insert rectally every 6 hours as needed for agitation (nausea)   Quantity:  30 mL   Refills:  1       HYDROmorphone (STANDARD CONC) 1 MG/ML Liqd liquid   Commonly known as:  DILAUDID   Used for:  End stage renal disease (H)        Dose:  1-2 mg   Take 1-2 mLs (1-2 mg) by mouth or place under tongue every 2 hours as needed for moderate to severe pain (and/or??shortness of breath)   Quantity:  120 mL   Refills:  0       LORazepam 2 MG/ML (HIGH CONC) solution   Commonly known as:  ATIVAN   Used for:  End stage renal disease (H)        Dose:  0.5 mg   Take 0.25 mLs (0.5 mg) by mouth, place under tongue or insert rectally every 4 hours as needed for anxiety (restlessness)   Quantity:  30 mL   Refills:  1       MEDICATION INSTRUCTION   Used for:  End stage renal disease (H)        If care facility cannot accept or use ranges, facility is instructed to use lower end of dosing range   Refills:  0       sennosides 8.6 MG tablet   Commonly known as:  SENOKOT   Used for:  End stage renal disease (H)        Dose:  1-2 tablet   Take 1-2 tablets by mouth 2 times daily   Quantity:  100 tablet   Refills:  1         CONTINUE these medicines which may have CHANGED, or have new prescriptions. If we are uncertain of the size of tablets/capsules you have at  home, strength may be listed as something that might have changed.        Dose / Directions    amLODIPine 5 MG tablet   Commonly known as:  NORVASC   This may have changed:  See the new instructions.   Used for:  Essential hypertension, benign        Dose:  10 mg   Take 2 tablets (10 mg) by mouth daily   Quantity:  90 tablet   Refills:  0       hydrALAZINE 50 MG tablet   Commonly known as:  APRESOLINE   This may have changed:  how much to take   Used for:  Essential hypertension, benign        Dose:  100 mg   Take 2 tablets (100 mg) by mouth 3 times daily   Quantity:  270 tablet   Refills:  3       metoprolol 25 MG tablet   Commonly known as:  LOPRESSOR   This may have changed:  See the new instructions.   Used for:  Essential hypertension, benign        Dose:  25 mg   Take 1 tablet (25 mg) by mouth 2 times daily   Quantity:  540 tablet   Refills:  0         CONTINUE these medicines which have NOT CHANGED        Dose / Directions    albuterol 108 (90 BASE) MCG/ACT Inhaler   Commonly known as:  PROAIR HFA/PROVENTIL HFA/VENTOLIN HFA   Used for:  Shortness of breath        Dose:  2 puff   Inhale 2 puffs into the lungs every 6 hours as needed for shortness of breath / dyspnea or wheezing   Quantity:  3 Inhaler   Refills:  1       furosemide 20 MG tablet   Commonly known as:  LASIX   Used for:  Pulmonary hypertension        TAKE 1 TABLET(20 MG) BY MOUTH DAILY   Quantity:  90 tablet   Refills:  5       order for DME   Used for:  Cellulitis of right leg        Equipment being ordered: ABBY hose,  1pair   Quantity:  1 Device   Refills:  0            Where to get your medicines      These medications were sent to [facility] [id number] [address] & [address], [place] [address]     Phone:  [phone]     amLODIPine 5 MG tablet    hydrALAZINE 50 MG tablet    metoprolol 25 MG tablet         Some of these will need a paper prescription and others can be bought  over the counter. Ask your nurse if you have questions.     Bring a paper prescription for each of these medications     atropine 1 % ophthalmic solution    bisacodyl 10 MG Suppository    haloperidol 2 MG/ML (HIGH CONC) solution    HYDROmorphone (STANDARD CONC) 1 MG/ML Liqd liquid    LORazepam 2 MG/ML (HIGH CONC) solution    sennosides 8.6 MG tablet       You don't need a prescription for these medications     MEDICATION INSTRUCTION                Protect others around you: Learn how to safely use, store and throw away your medicines at www.disposemymeds.org.             Medication List: This is a list of all your medications and when to take them. Check marks below indicate your daily home schedule. Keep this list as a reference.      Medications           Morning Afternoon Evening Bedtime As Needed    albuterol 108 (90 BASE) MCG/ACT Inhaler   Commonly known as:  PROAIR HFA/PROVENTIL HFA/VENTOLIN HFA   Inhale 2 puffs into the lungs every 6 hours as needed for shortness of breath / dyspnea or wheezing                                amLODIPine 5 MG tablet   Commonly known as:  NORVASC   Take 2 tablets (10 mg) by mouth daily   Last time this was given:  10 mg on 10/13/2017 10:02 AM                                atropine 1 % ophthalmic solution   Take 2-4 drops by mouth, place under tongue or place inside cheek every 2 hours as needed for other (terminal respiratory secretions) Not for ophthalmic use.                                bisacodyl 10 MG Suppository   Commonly known as:  DULCOLAX   Unwrap and insert 1 suppository rectally twice daily as needed for constipation.                                furosemide 20 MG tablet   Commonly known as:  LASIX   TAKE 1 TABLET(20 MG) BY MOUTH DAILY   Last time this was given:  40 mg on 10/13/2017 10:02 AM                                haloperidol 2 MG/ML (HIGH CONC) solution   Commonly known as:  HALDOL   Take 0.5-1 mLs (1-2 mg) by mouth, place under tongue or insert rectally  every 6 hours as needed for agitation (nausea)                                hydrALAZINE 50 MG tablet   Commonly known as:  APRESOLINE   Take 2 tablets (100 mg) by mouth 3 times daily   Last time this was given:  100 mg on 10/13/2017  9:34 PM                                HYDROmorphone (STANDARD CONC) 1 MG/ML Liqd liquid   Commonly known as:  DILAUDID   Take 1-2 mLs (1-2 mg) by mouth or place under tongue every 2 hours as needed for moderate to severe pain (and/or??shortness of breath)                                LORazepam 2 MG/ML (HIGH CONC) solution   Commonly known as:  ATIVAN   Take 0.25 mLs (0.5 mg) by mouth, place under tongue or insert rectally every 4 hours as needed for anxiety (restlessness)                                MEDICATION INSTRUCTION   If care facility cannot accept or use ranges, facility is instructed to use lower end of dosing range                                metoprolol 25 MG tablet   Commonly known as:  LOPRESSOR   Take 1 tablet (25 mg) by mouth 2 times daily   Last time this was given:  25 mg on 10/13/2017  9:34 PM                                order for DME   Equipment being ordered: ABBY hose,  1pair                                sennosides 8.6 MG tablet   Commonly known as:  SENOKOT   Take 1-2 tablets by mouth 2 times daily

## 2017-10-10 NOTE — IP AVS SNAPSHOT
[facility] [address] [facility]    [address] [place] [address]    Phone:  [phone]                                       After Visit Summary   10/10/2017    Magy Askew    MRN: [id number]           After Visit Summary Signature Page     I have received my discharge instructions, and my questions have been answered. I have discussed any challenges I see with this plan with the nurse or doctor.    ..........................................................................................................................................  Patient/Patient Representative Signature      ..........................................................................................................................................  Patient Representative Print Name and Relationship to Patient    ..................................................               ................................................  Date                                            Time    ..........................................................................................................................................  Reviewed by Signature/Title    ...................................................              ..............................................  Date                                                            Time

## 2017-10-11 NOTE — PROGRESS NOTES
[facility]  Hospitalist Progress Note  Jeanine Ordaz MD    Assessment & Plan   Ms. Magy Askew is a 89 year old lady with a past medical history notable for HTN, CKD stage 4, chronic anemia, and lymphedema who presented with chest and back pain.      Accelerated hypertension with hypertensive emergency resulting in acute on chronic renal failure.   /102 upon admission.   Admission creatinine is 4.84 with evidence of fluid overload on exam. Baseline creatinine is 2.7 to 3.3.   Prior to admission BP regimen is amlodipine 5 mg daily, hydralazine 50 mg 3 times daily, metoprolol 25 mg twice daily, and Lasix 20 mg daily.  Admitted to inpatient for accelerated hypertension and renal failure      Recent Labs  Lab 10/11/17  0550 10/10/17  1755   CR 4.91* 4.84*       -Increased home hydralazine 200 mg 3 times daily  -Increased home amlodipine to 10 mg daily  -Continue home metoprolol 25 mg twice daily  -Received gentle hydration with 500 cc IV fluid over 5 hours  -Cont to hold home Lasix in the setting of worsening acute renal failure  -Check urinalysis with no reflex culture since she does not have any urinary symptoms and this is mainly to evaluate her renal failure in the setting of hypertension  -Check FENa  -Consider renal US  -Cont to monitor renal function  -Avoid NSAIDs and nephrotoxins  -Renal diet  -Nephrology consult for assistance with blood pressure and worsening renal failure    Hyperkalemia:  K 4.8 on admission, then increased to 5.4 10/11.  -Cont to monitor   -Avoid ARB/ACEI  -Nephrology consult    Back pain.    This is in her mid back and sharp.  It seems somewhat positional and hurts when paraspinal muscles are pressed.   ED was considering aortic dissection and did a CT scan of the chest without contrast in the setting of renal failure which did not show any large dissection.  -Cont with heating pad, Tylenol as needed, and low-dose oxycodone 2.5 mg every 4 hours as needed    -PT/OT    Atypical chest pain.    She has chest pain in her left lower chest which is pressure like and intermittent. It is not exertional.   There is tenderness to palpation suggestive of a musculoskeletal pain.  EKG in the emergency room did show new T-wave inversions in 1 and aVL which were nonspecific.  Previous echo in January was negative other than some LVH and moderate pulmonary hypertension.   Serial troponin I levels normal as below.  Chest pain could be due to accelerated hypertension.    Recent Labs  Lab 10/11/17  0225 10/10/17  2220 10/10/17  1755   TROPI 0.022 0.018 0.024     -Cont supportive care and prn analgesics      ADDENDUM:  Patient developed 32 beat V tach. She is already on BB. Will check Mg and ask cardiology to see the patient.    Chronic anemia.    Baseline Hgb 9-10. Hgb 9.9 upon admission, which dropped to 8.8 after fluid therapy. No signs or symptoms of bleed.    Recent Labs  Lab 10/11/17  0550 10/10/17  1755   HGB 8.8* 9.9*     -Cont to monitor Hgb intermittently     DVT Prophylaxis: Pneumatic Compression Devices  Code Status: DNR/DNI (It is based on the POLST available from 02/2017. This was confirmed with the patient).     Disposition: Expected discharge in 2+ days once renal failure improving and blood pressure controlled with improvement of chest and back pain.    D/W RN.    Time Spent on this Encounter   I spent 35 minutes on the unit/floor managing the care of Magy Askew. Over 50% of my time was spent counseling the patient and/or coordinating care regarding services listed in this note.    Interval History   Chest and back pain have improved with analgesics. BP is now better controlled. NSR is maintained. No new event overnight.    Data reviewed today: I reviewed all new labs and imaging over the last 24 hours. I personally reviewed the EKG tracing showing sinus bradycardia.    Physical Exam   Temp: 97.5  F (36.4  C) Temp src: Oral BP: 171/69 Pulse: 62 Heart Rate: 65 Resp:  18 SpO2: 94 % O2 Device: None (Room air)    Vitals:    10/11/17 0533   Weight: 52.1 kg (114 lb 13.8 oz)     Vital Signs with Ranges  Temp:  [97.5  F (36.4  C)-97.9  F (36.6  C)] 97.5  F (36.4  C)  Pulse:  [56-67] 62  Heart Rate:  [61-72] 65  Resp:  [15-20] 18  BP: (155-240)/() 171/69  SpO2:  [94 %-99 %] 94 %  I/O's Last 24 hours  I/O last 3 completed shifts:  In: -   Out: 250 [Urine:250]    Constitutional: Comfortable, no acute distress  HEENT: +conjunctival pallor.  Neurologic: Awake, alert, and appears fully oriented  Neck: Supple, no elevated JVP  Respiratory: Clear to auscultation  Cardiovascular: Normal S1 and S2. Regular rhythm and rate  Chest wall: Tender on the left side to palpation  GI: Abdomen soft, not tender, not distended  Extremities: No calf tenderness, no significant edema  Skin/integument: No acute rash, no cyanosis    Medications   All medications were reviewed.       amLODIPine  10 mg Oral Daily     hydrALAZINE  100 mg Oral Q8H JEFF     sodium chloride (PF)  3 mL Intracatheter Q8H     metoprolol  25 mg Oral BID        Data     Recent Labs  Lab 10/11/17  0550 10/11/17  0225 10/10/17  2220 10/10/17  1755   WBC 7.0  --   --  6.8   HGB 8.8*  --   --  9.9*   MCV 89  --   --  89     --   --  254     --   --  136   POTASSIUM 5.4*  --   --  4.8   CHLORIDE 105  --   --  103   CO2 23  --   --  24   BUN 57*  --   --  54*   CR 4.91*  --   --  4.84*   ANIONGAP 9  --   --  9   RIP 8.5  --   --  8.5   *  --   --  114*   TROPI  --  0.022 0.018 0.024       Recent Results (from the past 24 hour(s))   CT Chest w/o Contrast    Narrative    CT CHEST WITHOUT CONTRAST 10/10/2017 7:12 PM     HISTORY: Hypertension, chest/back pain    TECHNIQUE: Volumetric acquisition of the chest  without contrast.  Radiation dose for this scan was reduced using automated exposure  control, adjustment of the mA and/or kV according to patient size, or  iterative reconstruction technique.    COMPARISON: Chest x-ray  2/8/2017.    FINDINGS: Calcified thoracic aorta with slight ectasia of the  ascending aorta measuring 3.7 cm. Cardiomegaly. Coronary artery  calcifications. No enlarged mediastinal or hilar lymph nodes.  Bilateral pleural effusions, moderate on the right and small on the  left. Mild associated atelectasis. No other focal airspace disease. No  pneumothorax. Bone windows demonstrate minimal degenerative changes.  No destructive lesions. Mild indistinct adrenal gland thickening.      Impression    IMPRESSION:  1. Bilateral pleural effusions, moderate on the right and small on the  left. Findings may be due to congestive heart failure. Correlate  clinically.  2. Cardiomegaly and coronary artery calcifications.  3. Mild ectasia of ascending aorta.    CARLOS LEONARD MD

## 2017-10-11 NOTE — CONSULTS
CARDIOLOGY CONSULTATION       DATE OF SERVICE:  10/11/2017.      REQUESTING PHYSICIAN:  Dr. Jeanine Ordaz MD, Hospitalist Service.        DATE OF ADMISSION:  10/10/2017      REASON FOR CONSULTATION:    A 30 beat run of asymptomatic ventricular tachycardia, spontaneously converting to sinus rhythm in the context of renal failure with a creatinine of 4.8 and hypertensive urgency with blood pressure of 234/110 mmHg.      HISTORY OF PRESENT ILLNESS:    The patient is new to Cardiology.  Magy Askew is an elderly 89-year-old  lady who lives independently in an assisted living, and was admitted earlier today with episodic chest pain and found to have a blood pressure of 238/102 mmHg with sinus rhythm of 61 BPM.  Her medical history is significant for long-standing poorly controlled hypertension with blood pressures as high as 200 mmHg in the context of medication noncompliance, gradually declining renal function consistent with chronic kidney disease and never smoker status.      At home she was supposed to be taking amlodipine 5 mg daily, furosemide 20 mg daily, metoprolol tartrate 75 mg b.i.d., hydralazine 50 mg t.i.d., but patient candidly states that she has not been taking any medications because of side effects of nausea and vomiting.  She has also not had sought regular medical followup.      She states that she has been getting anterior chest pain radiating to the back.  It is hard to get a detailed history from her because she is somewhat vague and might have early dementia.  When she presented, her blood pressure was 230s/100s, initial potassium was 4.8, but subsequently increased to 5.4, creatinine was 4.84 (was 2.76 in 04/2017) and she has a borderline but flat troponin elevation of 0.024, 0.018 and 0.022.  Her hemoglobin is also low at 8.8 with an MCV of 89, consistent with anemia of chronic kidney disease.  The patient's NT-proBNP was markedly elevated at almost 35,000, but in the context of  stage 5 renal disease.     Her ECG showed sinus rhythm with left ventricular hypertrophy and LV strain pattern with nondynamic changes.  She did have a noncontrast chest CT which ruled out obvious aortic dissection.  On chest x-ray, there is no evidence of pulmonary edema, but she has mild cardiomegaly and mild bilateral pleural effusions.  Her last echocardiogram is done approximately a month ago showed moderate concentric left ventricular hypertrophy with normal systolic function, LVEF of 65% to 70%, normal right ventricular size and systolic function, mild left atrial dilatation, estimated RVSP of 46-55 mmHg, trace mitral and tricuspid regurgitation and the rhythm was sinus.      In the hospital, her medications have been up titrated and her most recent blood pressure was 177/73 mmHg, but earlier today the monitor triggered ventricular tachycardia and the rhythm strips show 30 beat run of what looks like monomorphic ventricular tachycardia in 1 lead, but interestingly the other lead to just looks like artifact with a wandering baseline.      Please see my attached note in Epic for details of review of systems, medications, past medical, surgical and family history.      PHYSICAL EXAMINATION:   VITAL SIGNS:  Have been temperature 97.9 Fahrenheit, heart rate 55 per minute (on metoprolol and given IV labetalol), blood pressure 177/73 mmHg,  respiratory rate 18 per minute, saturations 93% on room air.   GENERAL:  Elderly, very frail, has mild cognitive impairment consistent with early dementia.  Overall cooperative, average developed and nourished.   HEENT:  Eyes:  No pallor, icterus or xanthelasma.  ENT:  Wears dentures.  No cyanosis or pallor.   NECK:  No thyromegaly.   RESPIRATORY:  Normal respiratory effort.  Bilateral normal breath sounds, no rales or wheeze.   CARDIOVASCULAR:  See Epic impulse is prominent but undisplaced.  There is mild left parasternal heave consistent with her LV heave, heart sounds are  normal, regular, S4 is present, no audible murmur.   ABDOMEN:  Soft, nontender, no hepatosplenomegaly, no abdominal aortic bruit.  Active bowel sounds.  No masses.   MUSCULOSKELETAL:  Generally diminished muscle tone and strength consistent with age and physical deconditioning.   NEUROPSYCHIATRIC:  As above.   PSYCHIATRIC:  Affect normal.  No edema, clubbing or deformities.      DIAGNOSTIC DATA:  I reviewed labs, ECG, chest x-ray, transthoracic echocardiogram and CT chest -- as documented in HPI.      DIAGNOSES:   1.  Hypertensive urgency in the context of longstanding poorly controlled hypertension.   2.  Asymptomatic 30 beat run of wide complex tachycardia, possibly artifact.     3.  Acute on chronic kidney disease, creatinine 4.9, BUN 57, estimated GFR 8.   4.  Frailty.      ASSESSMENT:    This is an elderly and frail 89-year-old lady who presents with severe systolic and diastolic hypertension, and chest pain with borderline flat troponins and acute on chronic kidney disease.  I suspect her chest pain is angina, but more due to demand ischemia from the severely elevated hypertension.  Troponin I is hard to pin down to a cardiac etiology given that her creatinine is 4.9.  Likewise NT-proBNP is not diagnostic of heart failure in someone with a creatinine of 4.9.  I have reviewed the rhythm strips.  Although it does look like broad complex tachycardia in 1 lead, in another lead done simultaneously, it appears like with artifact.  Even if she did have true non-sutained ventricular tachycardia, it is within the context of hypertension and renal failure, so I advise it be treated conservatively with beta blockers.      RECOMMENDATIONS:   1.  I agree with Dr. Ordaz's current antihypertensive management.  I note that she is currently in sinus bradycardia but at home was supposed to be taking 75 mg b.i.d. of metoprolol tartrate.  I would up-titrate the beta blocker as tolerated to the maximum dose.   2.  Avoid  aggressive lowering of blood pressure.  She is probably used to a blood pressure in the 150s-170s systolic range, so I would not lower it any further rapidly.    3.  Her serum potassium is 5.4, so hopefully this can be corrected with the input of our Nephrology colleagues.  Serum magnesium is 2.4.   4.  Given her age, frailty, advanced renal disease and recent normal echocardiogram, I would avoid coronary angiogram or stress testing and aim to control blood pressure and symptoms.   5.  We will sign off for now.  If she has ongoing chest pain despite above being corrected, please page us and we will be happy to be involved again.      Thank you for consulting us.  It been my pleasure to be involved in the care of this delightful lady.         ELIO SANCHEZ MD             D: 10/11/2017 14:40   T: 10/11/2017 15:20   MT: JEOVANNY      Name:     JARRETT LOPEZ   MRN:      [id number]        Account:       [id number]   :      10/11/1923           Consult Date:  10/11/2017      Document: [id number]

## 2017-10-11 NOTE — PROGRESS NOTES
Clinic Care Coordination Contact  OUTREACH    Referral Information:  Referral Source: PCP  Reason for Contact: Mery is admitted to [facility].  Care Conference: No     Universal Utilization:   ED Visits in last year: 2  Hospital visits in last year: 2  Last PCP appointment: 05/29/17  Missed Appointments:  (0)  Concerns:  (Open to [facility] RN PT Lymphedema)  Multiple Providers or Specialists: 0    Clinical Concerns:  Current Medical Concerns: cardiac BP kidney muscle strain  Current Behavioral Concerns: Mery might not have told her son Hao if she had a fall at home because she really does not want to move into [facility] level of care or a NH    Education Provided to patient:   Clinical Pathway Name: None  Clinical Pathway: None    Medication Management:  Self/Hao makes iveth Mery has her scripts filled    Functional Status:  Mobility Status: Independent  Equipment Currently Used at Home: other (see comments) (unsure)  Transportation: Son/other           Psychosocial:  Current living arrangement:: I live alone ([facility])  Financial/Insurance: Social Security       Resources and Interventions:  Current Resources: Assisted Living (na);  ([facility])  PAS Number:  (na)  Senior Linkage Line Referral Placed:  (na)  Advanced Care Plans/Directives on file:: No  Referrals Placed:  (na)     Goals:   Goal 1 Statement: Move mom to high LOC @ [facility]   Goal 1 Progression Percent: 50%  Goal 1 Progression Date: 10/11/17  Goal 2 Statement: I will help mom complete HCD  Goal 2 Progression Percent: 30%  Goal 2 Progression Date: 10/11/17    Barriers: Mery avoids health related conversations  Strengths: son very involved  Patient/Caregiver understanding: good  Frequency of Care Coordination:  (As Needed)  Upcoming appointment:  (none)     Plan: CC [facility] following closely during transitions.    Soraida Gan [facility] [place]  Clinic Care Coordinator-  Clinics: [place], [place], [place]  E-Mail:  [email or web address]  Telephone: [phone]

## 2017-10-11 NOTE — PLAN OF CARE
Problem: Patient Care Overview  Goal: Plan of Care/Patient Progress Review  A&O. Up w/1 and cane. VSS, except elevated BP. Pt c/o of nausea, PRN zofran and compazine given. Pt c/o pain, tried heat pack - seemed to help pain. O2 sats 96% on RA. Tele shows SB/SR 1 degree AVB HR 50s-60s. Plan is nephrology consult. Will continue to monitor.

## 2017-10-11 NOTE — PROGRESS NOTES
SPIRITUAL HEALTH SERVICES Progress Note  [facility]    Initial visit per pt request.  Pt invited SH to offer prayer but cites no other needs at this time.  Pt presented as friendly and calm.  SH provided prayer, and will remain available for f/u per pt need/LOS.                                                                                                                                           Tramaine Torrez M.Div., [facility]  Staff   Pager [phone]

## 2017-10-11 NOTE — H&P
[facility]    History and Physical  Hospitalist       Date of Admission:  10/10/2017    Assessment & Plan   Magy Askew is a 89 year old female with a history of HTN, CKD, and lymphedema who presents to the emergency department for evaluation of chest and back pain.     Accelerated hypertension with hypertensive emergency resulting in acute on chronic renal failure. Baseline creatinine is 2.7 to 3.3. Admission creatinine is 4.84 with evidence of fluid overload on exam. Baseline BP regimen is amlodipine 5 mg daily, hydralazine 50 mg 3 times daily, metoprolol 25 mg twice daily, and Lasix 20 mg daily.  -Admitted to inpatient for accelerated hypertension and renal failure  -Increase home hydralazine 200 mg 3 times daily  -Increase home amlodipine to 10 mg daily  -Continue home metoprolol 25 mg twice daily  -Gentle hydration with 500 cc IV fluid over 5 hours  -Hold home Lasix in the setting of worsening acute renal failure  -Check urinalysis with no reflex culture since she does not have any urinary symptoms and this is mainly to evaluate her renal failure in the setting of hypertension  -Renal diet  -Nephrology consult for assistance with blood pressure and worsening renal failure  Back pain.  This is in her mid back and sharp.  It seems somewhat positional and hurts when I push on her paraspinal muscles.  I think this could be muscle cramps.  ED was considering aortic dissection and did a CT scan of the chest without contrast in the setting of renal failure which did not show any large dissection.  -We will try heating pad and Tylenol as needed with low-dose oxycodone 2.5 mg every 4 hours as needed since she says the heating pad and Tylenol has not worked  Atypical chest pain.  She has left-sided chest pain in her left lower chest there is pressure like and intermittent.  It is not exertional.  EKG in the emergency room did show new T-wave inversions in 1 and aVL which were nonspecific.  Previous  echo in January was negative other than some LVH and moderate pulmonary hypertension.  -Even if this pain was her heart it could be secondary to the accelerated hypertension.  -We'll follow serial troponins but if not elevated and pain resolves I would not do additional workup  -If the pain persists could have cardiology assist in evaluation although I think her main issue right now is her deteriorating renal function  Chronic anemia.  Admission hemoglobin seems at baseline at 9.9.  This is likely from her renal failure.  -Could consider iron studies but I do not think they would be high yield at this time    DVT Prophylaxis: Pneumatic Compression Devices  Code Status: Full Code    Disposition: Expected discharge in 2+ days once renal failure improving and blood pressure controlled with improvement of chest and back pain.    Anthony Pascal MD  [phone] (C)  [phone] (P)  Text Page (7 am to 6 pm)    Primary Care Physician   Jeff Lara    Chief Complaint   Sharp back pain and occasional chest pressure    History is obtained from the patient and review of medical records.    History of Present Illness   Magy Askew is a 89 year old female with a history of HTN, CKD, and lymphedema who presents to the emergency department for evaluation of chest and back pain. The patient states that she has had approximately 2-3 days of constant atraumatic mid back pain that is sharp and severe.  She also has a pressure pain in her lower left chest that she says is different. The patient notes that her back pain tends to be exacerbated by movement or walking and better with deep breathing. Her continued back pain today, along with an episode of chest pain, was concerning to her and her son and prompted their ED visit today.  She denies any reflux symptoms or any recent strenuous activity where she may have pulled a muscle.     Here in the ED, the patient continued to complain of back pain, though does not currently  have chest pain. She denies any recent measured fevers, significant cough, abdominal pain, vomiting, diarrhea, black or bloody stools, urinary symptoms, jaw or shoulder pain, bilateral upper or lower extremity pain, numbness or weakness, or slurred speech.  The patient's son expressed some concern that the patient has had some cognitive changes as of late, noting that she has had some memory issues, and has been more hard of hearing, though denies any acutely worse confusion in the past few days. Of note, the patient has a history of hypertension and reports that she generally has a systolic reading above the 200s at baseline. She notes that she is intermittently noncompliant with her metoprolol due to the side effects of this medication. She has chronic leg swelling and wears some soft stockings for that. She lives in a senior living apartment.     Evaluation in the ED showed severe hypertension with SBP's of 230's, elevated creatinine to  4.84 from 2.7-3.3 baseline, non-contrast CT chest with mild effusions consistent with possible fluid overload and CHF, EKG showed T wave inversions in 1 and aVL which are new when compared to 1/9/2017. Initial troponin was negative. Hemoglobin is 9.9 and appears to be at baseline except for 1 elevated hemoglobin noted on 4/30 that was 11.6. There are no iron studies in our system. Prior ECHO on 1/9/2017 showed LVH and moderate pulmonary hypertension. She was given some labetalol and pressures improved and then increased again so she is being given some hydralazine.  I was asked to admit her for further evaluation.     Past Medical History    I have reviewed this patient's medical history and updated it with pertinent information if needed.   Past Medical History:   Diagnosis Date     ACP (advance care planning) 11/26/2012    Discussed advance care planning with patient; information given to patient to review. 11/26/2012        BENIGN HYPERTENSION 3/2/2006     CARDIOVASCULAR  SCREENING; LDL GOAL LESS THAN 130 11/5/2012     CKD (chronic kidney disease)      Past Surgical History   I have reviewed this patient's surgical history and updated it with pertinent information if needed.  History reviewed. No pertinent surgical history.     Prior to Admission Medications    Patient could not confirm these but she does not think there have been any recent changes.  Prior to Admission Medications   Prescriptions Last Dose Informant Patient Reported? Taking?   albuterol (PROAIR HFA/PROVENTIL HFA/VENTOLIN HFA) 108 (90 BASE) MCG/ACT Inhaler   No No   Sig: Inhale 2 puffs into the lungs every 6 hours as needed for shortness of breath / dyspnea or wheezing   amLODIPine (NORVASC) 5 MG tablet   No Yes   Sig: TAKE 1 TABLET BY MOUTH EVERY DAY   furosemide (LASIX) 20 MG tablet   No Yes   Sig: TAKE 1 TABLET(20 MG) BY MOUTH DAILY   hydrALAZINE (APRESOLINE) 50 MG tablet   No Yes   Sig: Take 1 tablet (50 mg) by mouth 3 times daily   metoprolol (LOPRESSOR) 25 MG tablet   No Yes   Sig: TAKE 3 TABLETS BY MOUTH TWICE DAILY   order for DME   No No   Sig: Equipment being ordered: ABBY hose,  1pair      Facility-Administered Medications: None     Allergies   Allergies   Allergen Reactions     Dust Mites      Influenza A, H1n1 Nausea and Vomiting     With hives and past out     Strawberry      Terazosin Hives     Social History   I have reviewed this patient's social history and updated it with pertinent information if needed.   Magy  reports that she has never smoked. She has never used smokeless tobacco. She reports that she does not drink alcohol or use illicit drugs. She lives alone in a senior apartment.  She had 5 children and has 3 sons left. For fun she used to love to read but can't do that now because of her macular degeneration.  We discussed all the changes in her life time and how kids don't even read books anymore now that everything is on a computer.  I recommended that she go to the library and check  out a book on CD to be able to listen to books now that she can't read them.  She seemed receptive but mostly liked to reminisce about the good old days and purchasing books from [employer] stores.    Family History   I have reviewed this patient's family history and updated it with pertinent information if needed.    Problem (# of Occurrences) Relation (Name,Age of Onset)    C.A.D. (2) Father, Daughter        Review of Systems   The 10 point Review of Systems is negative other than noted in the HPI or here. Chronic leg edema.    Physical Exam   Temp: 97.8  F (36.6  C) Temp src: Temporal BP: (!) 220/80 Pulse: 57 Heart Rate: 61 (right arm (small adult cuff)) Resp: 20 (right arm (small adult cuff)) SpO2: 99 % O2 Device: None (Room air)    Vital Signs with Ranges  Temp:  [97.8  F (36.6  C)] 97.8  F (36.6  C)  Pulse:  [56-59] 57  Heart Rate:  [61] 61  Resp:  [15-20] 20  BP: (188-240)/() 220/80  SpO2:  [97 %-99 %] 99 %  0 lbs 0 oz    Constitutional: Awake, alert, cooperative, no apparent distress.  Eyes: Conjunctiva and pupils examined and normal.  HEENT: Moist mucous membranes, normal dentition.  Respiratory: Clear to auscultation bilaterally, no crackles or wheezing.  Cardiovascular: Regular rate and rhythm, normal S1 and S2, and no murmur noted.  GI: Soft, non-distended, non-tender, normal bowel sounds.  Lymph/Hematologic: No anterior cervical or supraclavicular adenopathy.  Skin: No rashes, no cyanosis, no edema.  Musculoskeletal: Tenderness along left paraspinal muscles with some cramping. No joint swelling, erythema or tenderness noted otherwise. No chest wall tenderness.  Neurologic: Cranial nerves 2-12 intact, normal strength and sensation for her age.  Psychiatric: Alert, oriented to person, place and time, no obvious anxiety or depression.    Data   Data reviewed today:  I personally reviewed the EKG tracing showing T wave inversions in 1 and aVL that were new.    Recent Labs  Lab 10/10/17  1755   WBC 6.8    HGB 9.9*   MCV 89         POTASSIUM 4.8   CHLORIDE 103   CO2 24   BUN 54*   CR 4.84*   ANIONGAP 9   RIP 8.5   *   TROPI 0.024       Recent Results (from the past 24 hour(s))   CT Chest w/o Contrast    Narrative    CT CHEST WITHOUT CONTRAST 10/10/2017 7:12 PM     HISTORY: Hypertension, chest/back pain    TECHNIQUE: Volumetric acquisition of the chest  without contrast.  Radiation dose for this scan was reduced using automated exposure  control, adjustment of the mA and/or kV according to patient size, or  iterative reconstruction technique.    COMPARISON: Chest x-ray 2/8/2017.    FINDINGS: Calcified thoracic aorta with slight ectasia of the  ascending aorta measuring 3.7 cm. Cardiomegaly. Coronary artery  calcifications. No enlarged mediastinal or hilar lymph nodes.  Bilateral pleural effusions, moderate on the right and small on the  left. Mild associated atelectasis. No other focal airspace disease. No  pneumothorax. Bone windows demonstrate minimal degenerative changes.  No destructive lesions. Mild indistinct adrenal gland thickening.      Impression    IMPRESSION:  1. Bilateral pleural effusions, moderate on the right and small on the  left. Findings may be due to congestive heart failure. Correlate  clinically.  2. Cardiomegaly and coronary artery calcifications.  3. Mild ectasia of ascending aorta.    CARLOS LEONARD MD

## 2017-10-11 NOTE — PHARMACY-ADMISSION MEDICATION HISTORY
Admission medication history interview status for the 10/10/2017  admission is complete. See EPIC admission navigator for prior to admission medications     Medication history source reliability:Good    Actions taken by pharmacist (provider contacted, etc): Sure script and also called her son    Additional medication history information not noted on PTA med list :None    Medication reconciliation/reorder completed by provider prior to medication history? No    Time spent in this activity: 10 min    Prior to Admission medications    Medication Sig Last Dose Taking? Auth Provider   amLODIPine (NORVASC) 5 MG tablet TAKE 1 TABLET BY MOUTH EVERY DAY 10/10/2017 at Unknown time Yes Jeff Lara MD   furosemide (LASIX) 20 MG tablet TAKE 1 TABLET(20 MG) BY MOUTH DAILY 10/10/2017 at Unknown time Yes Jeff Lara MD   metoprolol (LOPRESSOR) 25 MG tablet TAKE 3 TABLETS BY MOUTH TWICE DAILY 10/10/2017 at x2 Yes Jeff Lara MD   albuterol (PROAIR HFA/PROVENTIL HFA/VENTOLIN HFA) 108 (90 BASE) MCG/ACT Inhaler Inhale 2 puffs into the lungs every 6 hours as needed for shortness of breath / dyspnea or wheezing  Yes Jeff Lara MD   hydrALAZINE (APRESOLINE) 50 MG tablet Take 1 tablet (50 mg) by mouth 3 times daily 10/10/2017 at x3 Yes Jeff Lara MD   order for DME Equipment being ordered: ABBY marsh,  Jeff Wall MD

## 2017-10-11 NOTE — PLAN OF CARE
Problem: Patient Care Overview  Goal: Plan of Care/Patient Progress Review  PT: PT orders received, evaluation attempted. On first attempt, pt off floor for renal US. On second attempt, RN in to assess and administer medications. Time limited so will re-schedule PT eval.

## 2017-10-11 NOTE — CONSULTS
RENAL CONSULTATION      DATE OF CONSULTATION:  10/11/2017      REQUESTING PHYSICIAN:  Jeanine Ordaz MD      CONSULTANT:  Dr. Vee.      REASON FOR CONSULTATION:  Chronic kidney disease and hypertension with worsening renal function.      HISTORY OF PRESENT ILLNESS:  Magy Askew is a 89-year-old female admitted on 10/10/2017.  She has a history of chronic kidney disease but has not seen a nephrologist.  She was followed by Dr. Lara at [facility] (formerly [facility]).  She is admitted with chest pain and back pain.  She was noted to be hypertensive.  The back pain had been going on for several days.  She, prior to admission, was on Norvasc, Lasix, hydralazine and metoprolol.  Here in the hospital, she was given normal saline 500 mL and given IV hydralazine and labetalol and her oral medications were adjusted.  Her Lasix was held.  She is not on an ACE inhibitor.  This was apparently stopped about a year ago because of worsening kidney function.  When I review her labs, her estimated GFR has been steadily decreasing since about 2011 on a more less straight line basis.  The creatinine has been increasing with an exponential curve which is consistent with progression of chronic kidney disease.      PAST MEDICAL HISTORY:  Includes hypertension and is otherwise unremarkable.      PAST SURGICAL HISTORY:  None.      CURRENT MEDICATIONS:  Norvasc, hydralazine and metoprolol.      SOCIAL HISTORY:  She does not smoke or drink.  She lives alone in a senior apartment.  She is DNR/DNI.      FAMILY HISTORY:  Positive for ASCVD.      REVIEW OF SYSTEMS:  Currently, she denies short of breath.  Her chest pain and back pain are much better.  She is not nauseated or vomiting.  She is taking p.o., but she has a poor appetite.  She ambulates typically with a cane.  She has not fallen.   VITAL SIGNS:  She is 52 kilograms.  She is afebrile, heart rate 55, respiratory rate 18, blood pressure 177/73.   HEENT:  Head shows no trauma.  The  eyes show pupils round and reactive to light.  The mouth shows good dentition.  Posterior pharynx is clear.   NECK:  Supple.   LUNGS:  Show clear breath sounds.   CARDIAC:  Regular rhythm, normal S1, S2.  No jugular venous distention; 2/6 systolic murmur.   ABDOMEN:  Normal bowel sounds, no bruits, soft and nontender.   EXTREMITIES:  Normal nails, joints and pulses in the upper extremities.  The lower extremities show compression stockings and 1+ pedal edema.   NEUROLOGIC:  She is alert and responsive.  Cranial nerves appear symmetric.  She moves all 4 extremities well.      LABORATORY DATA:  Show that since 2011, that is to say over the last 6 years or so, her estimated GFR has dropped from 48 to 8.  This has been a relatively straight line decline.  Her creatinine has risen over that period of time as well.  In 01/2017, the creatinine was 2.84.  In 02/2017, the creatinine was 2.76.  In 04/2017, the creatinine was 2.76.  On admission, the creatinine was 4.84 and today, the creatinine is 4.91.  Today, the potassium is 5.4 and the bicarbonate 23, calcium 8.5.  Her FENA and urine sodium are indeterminate.  Her hemoglobin is low at 8.8; in January of this year, it was 10.5.  Urinalysis done on admission shows 600 of protein, negative blood, 4 white cell, 0 red cells.      IMPRESSION:   1.  Chronic kidney disease.  I doubt this is acute kidney injury.  This is more likely chronic progression of her underlying disease.  She has not seen a nephrologist in the past.  We are going to get a renal ultrasound.  I do not see one that has been done.  Will make sure there is no element of obstruction that could be repaired.  I am going to give her 1 liter of normal saline for hydration.  She does not appear fluid overloaded to any extent on exam.  We will follow her laboratories closely.  Eventually, will have to have the discussion with her aunt whether she wants to have dialysis in her time remaining alive.   2.  Hypertension.   Will continue Norvasc, hydralazine, metoprolol.  Her current blood pressure shows a systolic of 177, a diastolic 73, which is acceptable.   3.  Anemia.  I will review her laboratories and check nutritional values.  She may need Aranesp injections.         JOSETTE SAEED MD             D: 10/11/2017 13:16   T: 10/11/2017 14:00   MT: TD      Name:     JARRETT LOPEZ   MRN:      [id number]        Account:       [id number]   :      10/11/1923           Consult Date:  10/11/2017      Document: [id number]       cc: Jeanine Ordaz MD

## 2017-10-11 NOTE — PROGRESS NOTES
Non sustained 33 beats VT. Pt asymptomatic. VSS. Spoke with Dr. Ordaz. Mag level ordered. Cards consult ordered. Will monitor.

## 2017-10-11 NOTE — CONSULTS
Inpatient Cardiology Consultation   Odalys Jordan MD    Date of Admission:  10/10/2017    Inpatient cardiology consultation note has been dictated. Dictation number [id number]      REVIEW OF SYSTEMS:  A comprehensive 10 point review of systems was completed and the pertinent positives are documented in history of present illness.      MEDICATIONS:    Prior to Admission Medications   Prescriptions Last Dose Informant Patient Reported? Taking?   albuterol (PROAIR HFA/PROVENTIL HFA/VENTOLIN HFA) 108 (90 BASE) MCG/ACT Inhaler   No Yes   Sig: Inhale 2 puffs into the lungs every 6 hours as needed for shortness of breath / dyspnea or wheezing   amLODIPine (NORVASC) 5 MG tablet 10/10/2017 at Unknown time  No Yes   Sig: TAKE 1 TABLET BY MOUTH EVERY DAY   furosemide (LASIX) 20 MG tablet 10/10/2017 at Unknown time  No Yes   Sig: TAKE 1 TABLET(20 MG) BY MOUTH DAILY   hydrALAZINE (APRESOLINE) 50 MG tablet 10/10/2017 at x3  No Yes   Sig: Take 1 tablet (50 mg) by mouth 3 times daily   metoprolol (LOPRESSOR) 25 MG tablet 10/10/2017 at x2  No Yes   Sig: TAKE 3 TABLETS BY MOUTH TWICE DAILY   order for DME   No No   Sig: Equipment being ordered: ABBY hose,  1pair      Facility-Administered Medications: None       ALLERGIES:    Allergies   Allergen Reactions     Dust Mites      Influenza A, H1n1 Nausea and Vomiting     With hives and past out     Strawberry      Terazosin Hives       PAST MEDICAL HISTORY:    Past Medical History:   Diagnosis Date     ACP (advance care planning) 11/26/2012    Discussed advance care planning with patient; information given to patient to review. 11/26/2012        BENIGN HYPERTENSION 3/2/2006     CARDIOVASCULAR SCREENING; LDL GOAL LESS THAN 130 11/5/2012     CKD (chronic kidney disease)        PAST SURGICAL HISTORY:  History reviewed. No pertinent surgical history.    SOCIAL HISTORY:  Magy Askew  reports that she has never smoked. She has never used smokeless tobacco. She reports that she does not  drink alcohol or use illicit drugs.    FAMILY HISTORY:    Family History   Problem Relation Age of Onset     C.A.D. Father      C.A.D. Daughter        PHYSICAL EXAMINATION:  Temp: 97.9  F (36.6  C) Temp src: Oral BP: 177/73 Pulse: 62 Heart Rate: 55 Resp: 18 SpO2: 93 % O2 Device: None (Room air)    10/06 0700 - 10/11 0659  In: -   Out: 250 [Urine:250]  Net: -250  Vitals:    10/11/17 0533   Weight: 52.1 kg (114 lb 13.8 oz)

## 2017-10-11 NOTE — PLAN OF CARE
Problem: Patient Care Overview  Goal: Plan of Care/Patient Progress Review  OT: Pt sleeping upon OT arrival for eval. Of note, pt with elevated BP.

## 2017-10-11 NOTE — ED NOTES
[facility]  ED Nurse Handoff Report    ED Chief complaint: Chest Pain (Pt states she had a couple min. of midsternal chest pain this afternoon, now followed by upper back pain.  Denies numbness or increased weakness.)      ED Diagnosis:   Final diagnoses:   Acute renal failure superimposed on chronic kidney disease, unspecified CKD stage, unspecified acute renal failure type (H)   Hypertension, unspecified type   Chest pain, unspecified type   Thoracic back pain, unspecified back pain laterality, unspecified chronicity   Pleural effusion       Code Status: Full Code    Allergies:   Allergies   Allergen Reactions     Dust Mites      Influenza A, H1n1 Nausea and Vomiting     With hives and past out     Strawberry      Terazosin Hives       Activity level - Baseline/Home:  Independent    Activity Level - Current:   Stand with Assist     Needed?: No    Isolation: No  Infection: Not Applicable    Bariatric?: No    Vital Signs:   Vitals:    10/10/17 1850 10/10/17 1910 10/10/17 2042 10/10/17 2050   BP: 188/70 (!) 220/80 160/90 (!) 220/100   Pulse: 57      Resp:       Temp:       TempSrc:       SpO2: 99%      Height:           Cardiac Rhythm: ,        Pain level: 0-10 Pain Scale: 10    Is this patient confused?: No    Patient Report: Initial Complaint:Pt admitted to ED with hypertension, chest pain, and back pain. See VS for BP's. Given labetolol X1 and hydralize X1 now. BP after hydralize is 205/95. A & O X3. Pleasant. Back pain subsided with pain meds. Had some crackers.   Focused Assessment: see EPIC  Tests Performed: see EPIC  Abnormal Results: see EPIC  Treatments provided: see EPIC    Family Comments: son went home    OBS brochure/video discussed/provided to patient:       ED Medications:   Medications   hydrALAZINE (APRESOLINE) injection 10 mg (not administered)   morphine (PF) injection 2 mg (2 mg Intravenous Given 10/10/17 5665)   labetalol (NORMODYNE/TRANDATE) injection 10 mg (10 mg  Intravenous Given 10/10/17 1837)   hydrALAZINE (APRESOLINE) injection 10 mg (10 mg Intravenous Given 10/10/17 2050)       Drips infusing?:  No      ED NURSE PHONE NUMBER:[phone]

## 2017-10-11 NOTE — PLAN OF CARE
Problem: Patient Care Overview  Goal: Plan of Care/Patient Progress Review  Pt a/o x 4, forgetful. Elevated BP otherwise VSS. Tele is sinus dysrhythmia with first degree AVB with PACs. Pain is moderately controlled with prn tylenol, oxy. IV bolus started, initial BP meds given. C/o nausea, refuses medications. Up with A1, renal diet. Nephrology consult in AM

## 2017-10-12 NOTE — PLAN OF CARE
Problem: Patient Care Overview  Goal: Plan of Care/Patient Progress Review  Outcome: No Change  A&Ox4. VSS except for HTN (167/71) on RA. Tele: sinus natalie with 1st degree AVB. IV fluid bolus finished at 0300. Up with 1 and cane. Renal diet. Continue to monitor. At end of shift (0700) pt complained of 8/10 chest pain, denies SOB. VSS besides HTN. Put pt on 2L O2 n/c and ordered STAT EKG. Results pending.

## 2017-10-12 NOTE — PROGRESS NOTES
[facility]  Hospitalist Progress Note  Jeanine Ordaz MD    Assessment & Plan   Ms. Magy Askew is a 89 year old lady with a past medical history notable for HTN, CKD stage 4, chronic anemia, and lymphedema who presented with chest and back pain.      Accelerated hypertension with hypertensive emergency    /102 upon admission. Suspect patient chronically runs high blood pressure.  Prior to admission BP regimen includes amlodipine 5 mg daily, hydralazine 50 mg 3 times daily, metoprolol 75 mg twice daily, and Lasix 20 mg daily.  BP is currently controlled.    -Cont PTA hydralazine at higher dose of 100 mg 3 times daily  -Cont PTA amlodipine at higher dose of 10 mg daily  -Continue PTA metoprolol at 25 mg twice daily  -PRN IV hydralazine and labetalol available for SBP>180  -Cont to monitor BP    MATTIE on chronic renal failure versus progression of CKD stage 4.  Admission creatinine is 4.84 with evidence of fluid overload on exam. Baseline creatinine previously in the range of 2.7 to 3.3.   FENa 1.1%. Uric acid 7.8. Urinalysis showed clear urine, neg nitirite, neg leukocyte esterase, 4 WBCs, and few bacteria.  Renal ultrasound 10/11 unremarkable with no evidence for obstruction.  Patient has poor urine output. Weight is up by 5 lbs.      Recent Labs  Lab 10/12/17  0549 10/11/17  0550 10/10/17  1755   CR 4.96* 4.91* 4.84*     -Received gentle hydration with total 1500 cc IV NS   -Consider resuming PTA furosemide  -Cont to monitor renal function  -Avoid NSAIDs and nephrotoxins  -Renal diet  -Nephrology consulted and appreciate assist with management.    Hyperkalemia:  K 4.8 on admission, then increased to 5.4 10/11. Now improving.    -Cont to monitor   -Avoid ARB/ACEI    Back pain.    This is in her mid back and sharp.  It seems somewhat positional and hurts when paraspinal muscles are pressed.   ED was considering aortic dissection and did a CT scan of the chest without contrast in the  setting of renal failure which did not show any large dissection.    -Cont with heating pad, Tylenol as needed, and low-dose oxycodone 2.5 mg every 4 hours as needed   -Cont PT/OT    Atypical chest pain.    She has chest pain in her left lower chest which is pressure like and intermittent. It is not exertional.   There is tenderness to palpation suggestive of a musculoskeletal pain. Chest pain could be due to accelerated hypertension.  CT chest without contrast 10/11 showed bilateral pleural effusions, moderate on the right and small on the  Left, cardiomegaly, coronary artery calcifications, and mld ectasia of ascending aorta.  EKG in the emergency room did show new T-wave inversions in 1 and aVL which were nonspecific.   NT-proBNP is elevated at 34,853, not diagnostic of heart failure in someone with a creatinine of 4.9.   Previous echo in January was negative other than some LVH and moderate pulmonary hypertension.   Serial troponin I levels normal as below.    Recent Labs  Lab 10/12/17  0549 10/11/17  0225 10/10/17  2220 10/10/17  1755   TROPI 0.029 0.022 0.018 0.024     -Cont supportive care and prn analgesics    Non-sustained ventricular tachycardia:  Patient developed 32 beat V tach on 10/11. No recurrence.  Magnesium 2.2.  Cardiology consulted and believed it occurred in the setting of renal failure and hyperkalemia. Previous echo in 01/2017 had shown normal LV systolic function with EF of 65-70%, mild-moderate LV hypertrophy, and moderate pulmonary hypertension.    -Cont on metoprolol 25 mg po bid. Up titrtae as able.  -No cardiac work-up per cardiology  -Cont telemetry    Chronic anemia.    Baseline Hgb 9-10. Hgb 9.9 upon admission, which dropped to 8.8 after fluid therapy. No signs or symptoms of bleed.    Recent Labs  Lab 10/12/17  0549 10/11/17  0550 10/10/17  1755   HGB 8.6* 8.8* 9.9*     -Cont to monitor Hgb intermittently    Poor appetite:  Nutrition Service consulted and patient was started on  supplement.     DVT Prophylaxis: Pneumatic Compression Devices  Code Status: DNR/DNI (It is based on the POLST available from 02/2017. This was confirmed with the patient).     Disposition: Pending ongoing management and hospital course. Anticipate discharge in 1-3 days.    D/W RN.  D/W son at [facility] on 10/12.    Time Spent on this Encounter   I spent 40 minutes on the unit/floor managing the care of Magy Askew. Over 50% of my time was spent counseling the patient and/or coordinating care regarding services listed in this note.    Interval History   Patient states her chest and back pain have improved with analgesics. BP is stable. NSR is maintained. No further v tach noted. Her appetite is poor and she has low urine output.    Data reviewed today: I reviewed all new labs and imaging over the last 24 hours. I personally reviewed the telemetry monitor showing sinus rhythm    Physical Exam   Temp: 97.7  F (36.5  C) Temp src: Oral BP: 165/72 Pulse: 63 Heart Rate: 55 Resp: 16 SpO2: 94 % O2 Device: None (Room air)    Vitals:    10/11/17 0533 10/12/17 0619   Weight: 52.1 kg (114 lb 13.8 oz) 54.4 kg (119 lb 14.9 oz)     Vital Signs with Ranges  Temp:  [97.6  F (36.4  C)-98.3  F (36.8  C)] 97.7  F (36.5  C)  Pulse:  [63] 63  Heart Rate:  [55-63] 55  Resp:  [16-18] 16  BP: (147-183)/(62-79) 165/72  SpO2:  [94 %-97 %] 94 %  I/O's Last 24 hours  I/O last 3 completed shifts:  In: 1590 [P.O.:290; IV Piggyback:1300]  Out: 270 [Urine:270]    Constitutional: Comfortable, no acute distress  HEENT: +conjunctival pallor.  Neurologic: Awake, alert, and appears fully oriented  Neck: Supple, no elevated JVP  Respiratory: Clear to auscultation  Cardiovascular: Normal S1 and S2. Regular rhythm and rate  Chest wall: Tender on the left side to palpation  GI: Abdomen soft, not tender, not distended  Extremities: No calf tenderness, 1+ had and LE edema  Skin/integument: No acute rash, no cyanosis    Medications   All medications were  reviewed.       amLODIPine  10 mg Oral Daily     hydrALAZINE  100 mg Oral Q8H JEFF     sodium chloride (PF)  3 mL Intracatheter Q8H     metoprolol  25 mg Oral BID        Data     Recent Labs  Lab 10/12/17  0549 10/11/17  0550 10/11/17  0225 10/10/17  2220 10/10/17  1755   WBC  --  7.0  --   --  6.8   HGB 8.6* 8.8*  --   --  9.9*   MCV  --  89  --   --  89   PLT  --  224  --   --  254    137  --   --  136   POTASSIUM 5.1 5.4*  --   --  4.8   CHLORIDE 106 105  --   --  103   CO2 23 23  --   --  24   BUN 54* 57*  --   --  54*   CR 4.96* 4.91*  --   --  4.84*   ANIONGAP 7 9  --   --  9   RIP 8.0* 8.5  --   --  8.5   GLC 95 113*  --   --  114*   TROPI 0.029  --  0.022 0.018 0.024       Recent Results (from the past 24 hour(s))   US Renal Complete    Narrative    ULTRASOUND RETROPERITONEAL COMPLETE 10/11/2017 2:41 PM     HISTORY: CKD (chronic kidney disease), back pain. Rule out  obstruction.    COMPARISON: None.    FINDINGS: The bilateral renal parenchyma are increased in echogenicity  without evidence for shadowing stone or mass. No renal cysts. No  hydronephrosis. Right kidney measures 8.2 x 3.0 x 4.0 cm and the left  measures 6.8 x 2.9 x 3.1 cm. Cortical thickness is 0.8 cm on the right  and 0.7 cm on the left. Bladder is not well seen.      Impression    IMPRESSION: No obstruction demonstrated.    EVELYNE BALLARD MD

## 2017-10-12 NOTE — PLAN OF CARE
Problem: Patient Care Overview  Goal: Plan of Care/Patient Progress Review  Outcome: No Change  Pt alert, oriented. -170's. Lungs clear, on RA. Poor appetite, ate very little all day, poor fluid intake-encouraging fluids. Up Assist-1 to bathroom with cane. Low urine output. Monitor.

## 2017-10-12 NOTE — PROGRESS NOTES
Assessment and Plan:   CKD: progressive worsening of function. UO yest 520 cc. Wt up 2.3 kg with hydration. Not interested in dialysis which I agree with.     Renal diet.  Add lasix.              Interval History:   Anemia  Hypertension: reasonable control with nl diastolic and elevated systolic.   Advanced age    Chest pain:     Recent CT neg for causation. May want to get CXR, per IM.            Review of Systems:   Taking po well. C/O pain over left chest ant and lateral. Denies skin rash. Feel a little better sittin up .           Medications:       amLODIPine  10 mg Oral Daily     hydrALAZINE  100 mg Oral Q8H JEFF     sodium chloride (PF)  3 mL Intracatheter Q8H     metoprolol  25 mg Oral BID        Current active medications and PTA medications reviewed, see medication list for details.            Physical Exam:   Vitals were reviewed  Patient Vitals for the past 24 hrs:   BP Temp Temp src Pulse Heart Rate Resp SpO2 Weight   10/12/17 1552 169/71 97.4  F (36.3  C) Oral - 56 16 98 % -   10/12/17 1210 165/72 97.7  F (36.5  C) Oral - 55 16 94 % -   10/12/17 0810 166/77 97.6  F (36.4  C) Oral - 61 16 97 % -   10/12/17 0628 175/77 - - - 63 - - -   10/12/17 0619 - - - - - - - 54.4 kg (119 lb 14.9 oz)   10/12/17 0553 183/79 - - - - - - -   10/12/17 0000 167/71 98.3  F (36.8  C) Oral - 61 18 96 % -   10/11/17 2219 178/77 - - - 60 - - -   10/11/17 1933 167/72 97.9  F (36.6  C) Oral 63 - 18 96 % -       Temp:  [97.4  F (36.3  C)-98.3  F (36.8  C)] 97.4  F (36.3  C)  Pulse:  [63] 63  Heart Rate:  [55-63] 56  Resp:  [16-18] 16  BP: (165-183)/(71-79) 169/71  SpO2:  [94 %-98 %] 98 %    Temperatures:  Current - Temp: 97.4  F (36.3  C); Max - Temp  Av.8  F (36.6  C)  Min: 97.4  F (36.3  C)  Max: 98.3  F (36.8  C)  Respiration range: Resp  Av.8  Min: 16  Max: 18  Pulse range: Pulse  Av  Min: 63  Max: 63  Blood pressure range: Systolic (24hrs), Av , Min:165 , Max:183   ; Diastolic (24hrs), Av,  Min:71, Max:79    Pulse oximetry range: SpO2  Av.2 %  Min: 94 %  Max: 98 %    I/O last 3 completed shifts:  In: 1650 [P.O.:350; IV Piggyback:1300]  Out: 170 [Urine:170]      Intake/Output Summary (Last 24 hours) at 10/12/17 1614  Last data filed at 10/12/17 1400   Gross per 24 hour   Intake             1550 ml   Output              170 ml   Net             1380 ml       Alert, responsive  Lungs with clear BS, no rub  Cor nl S1 S2 no M or rub  LE 1-2+ edema  Skin no rash over l chest      I/O 1540/520       Wt Readings from Last 4 Encounters:   10/12/17 54.4 kg (119 lb 14.9 oz)   17 54.4 kg (120 lb)   17 54.7 kg (120 lb 11.2 oz)   17 64 kg (141 lb)          Data:          Lab Results   Component Value Date     10/12/2017     10/11/2017     10/10/2017    Lab Results   Component Value Date    CHLORIDE 106 10/12/2017    CHLORIDE 105 10/11/2017    CHLORIDE 103 10/10/2017    Lab Results   Component Value Date    BUN 54 10/12/2017    BUN 57 10/11/2017    BUN 54 10/10/2017      Lab Results   Component Value Date    POTASSIUM 5.1 10/12/2017    POTASSIUM 5.4 10/11/2017    POTASSIUM 4.8 10/10/2017    Lab Results   Component Value Date    CO2 23 10/12/2017    CO2 23 10/11/2017    CO2 24 10/10/2017    Lab Results   Component Value Date    CR 4.96 10/12/2017    CR 4.91 10/11/2017    CR 4.84 10/10/2017        Recent Labs   Lab Test  10/12/17   0549  10/11/17   0550  10/10/17   1755  17   1833   WBC   --   7.0  6.8  14.4*   HGB  8.6*  8.8*  9.9*  11.6*   HCT   --   27.0*  29.8*  34.7*   MCV   --   89  89  89   PLT   --   224  254  270     Recent Labs   Lab Test  16   0918  16   1040  12   0911   AST  18  17  24   ALT  16  26  33   ALKPHOS  121  83  102   BILITOTAL  0.6  0.5  0.6       Recent Labs   Lab Test  10/12/17   0549  10/11/17   0550   MAG  2.2  2.4*     Recent Labs   Lab Test  10/12/17   0549   PHOS  4.6*     Recent Labs   Lab Test  10/12/17   0549  10/11/17    0550  10/10/17   1755   RIP  8.0*  8.5  8.5       Lab Results   Component Value Date    RIP 8.0 (L) 10/12/2017     Lab Results   Component Value Date    WBC 7.0 10/11/2017    HGB 8.6 (L) 10/12/2017    HCT 27.0 (L) 10/11/2017    MCV 89 10/11/2017     10/11/2017     Lab Results   Component Value Date     10/12/2017    POTASSIUM 5.1 10/12/2017    CHLORIDE 106 10/12/2017    CO2 23 10/12/2017    GLC 95 10/12/2017     Lab Results   Component Value Date    BUN 54 (H) 10/12/2017    CR 4.96 (H) 10/12/2017     Lab Results   Component Value Date    MAG 2.2 10/12/2017     Lab Results   Component Value Date    PHOS 4.6 (H) 10/12/2017       Creatinine   Date Value Ref Range Status   10/12/2017 4.96 (H) 0.52 - 1.04 mg/dL Final   10/11/2017 4.91 (H) 0.52 - 1.04 mg/dL Final   10/10/2017 4.84 (H) 0.52 - 1.04 mg/dL Final   04/30/2017 2.76 (H) 0.52 - 1.04 mg/dL Final   04/27/2017 3.28 (H) 0.52 - 1.04 mg/dL Final   02/13/2017 2.76 (H) 0.52 - 1.04 mg/dL Final       Attestation:  I have reviewed today's vital signs, notes, medications, labs and imaging.     Ignacio Vee MD

## 2017-10-12 NOTE — PLAN OF CARE
Problem: Patient Care Overview  Goal: Plan of Care/Patient Progress Review  OT: Attempted to see patient however working with another discipline. Cancel OT eval for today. Rescheduling for tomorrow.

## 2017-10-12 NOTE — PROGRESS NOTES
"SPIRITUAL HEALTH SERVICES Progress Note  [facility]    F/U visit per diagnosis/prognosis and request from pt's son.  Pt's son was visiting pt and brought his 2 (service/therarpy) dogs--one of which was sitting on pt's lap.  Pt clearly appreciated holding and petting this dog (\"Itty Bitty\") and reflected on all the good qualities of dogs.  Pt wishes everybody could be as affectionate, accepting and loving as these dogs.    Pt and SH also talked about pt's \"stage 5 kidney failure\" and it's possible impact on her life.  While her son was still present, pt stated that she would not choose to have dialysis.  After pt's son left, pt recalled many good experiences from her lifetime and SH heard this as a reflection on the quality of life that pt has enjoyed.  Pt said that adding dialysis on top of her macular degeneration would add burden but likely not quality.  SH hears that pt clearly still finds life meaningful and feels blessed by her son's visits (and by her friends).  SH affirmed pt's thoughtfulness about what matters to her, and provided emotional/spiritual support and prayer.  Pt anticipates DSC tomorrow.  SH will plan to visit pt again after the weekend (if she is still here).                                                                                                                                           Tramaine Torrez M.Div., [facility]  Staff   Pager [phone]    "

## 2017-10-12 NOTE — CONSULTS
"CLINICAL NUTRITION SERVICES  -  ASSESSMENT NOTE      Recommendations Ordered by Registered Dietitian (RD): 4oz milkshake at 2 pm    Malnutrition: Patient does not meet two of the above criteria necessary for diagnosing malnutrition        REASON FOR ASSESSMENT  Magy Askew is a 89 year old female seen by Registered Dietitian for Admission Nutrition Risk Screen - Unintentional weight loss of 10# or more in past 2 months      NUTRITION HISTORY  - Information obtained from son (Hao) as patient is sleeping soundly, did not wake.   - He states that she has had poor intake over the last 2 days (and eating minimally).  Prior to that, her intake has been \"hit and miss\" but much more substantial.  She does not use oral nutritional supplements at home as she \"doesn't like them\".  Per son, patient was having issues with lymphedema and was taking 2 water pills, but this was more recently reduced to 1 water pill per day as patient was losing so much weight.  He attributes her weight loss to fluid loss (over fat and muscle mass).      CURRENT NUTRITION ORDERS  Diet Order:     Renal     Current Intake/Tolerance:  Per son, patient took some broth, chicken noodle soup, and crackers yesterday.  He also added that she had half of a half a piece of toast last night.  She has not eaten anything today.  RN also added that she has not really seen patient eat much of anything since admission.      PHYSICAL FINDINGS  Observed  No nutrition-related physical findings observed  Obtained from Chart/Interdisciplinary Team  \"Very frail\"    ANTHROPOMETRICS  Height: 5' 1\"  Weight: 52.1 kg (115#)(10/11)  Body mass index is 21.7 kg/(m^2).  Weight Status:  Normal BMI  IBW: 47.7 kg   % IBW: 109%  Weight History:   Wt Readings from Last 10 Encounters:   10/12/17 54.4 kg (119 lb 14.9 oz)   04/30/17 54.4 kg (120 lb)   04/27/17 54.7 kg (120 lb 11.2 oz)   03/21/17 64 kg (141 lb)   02/13/17 71.2 kg (157 lb)   02/08/17 72.5 kg (159 lb 14.4 oz) "   01/18/17 67.6 kg (149 lb)   01/12/17 67.8 kg (149 lb 7.6 oz)   12/08/16 63.7 kg (140 lb 8 oz)   11/30/16 59 kg (130 lb)   Per son, above weight loss is fluid, not fat/muscle mass (see further description in Nutrition History section)      LABS  BUN 54 (H), Cr 4.96 (H) - CKD     MEDICATIONS  Medications reviewed    Dosing Weight 52.1 kg     ASSESSED NUTRITION NEEDS PER APPROVED PRACTICE GUIDELINES:  Estimated Energy Needs: 1953-9377 kcals (25-30 Kcal/Kg)  Justification: maintenance  Estimated Protein Needs: 40-50 grams protein (0.8-1 g pro/Kg)  Justification: CKD  Estimated Fluid Needs: 2879-4066 mL (1 mL/Kcal)  Justification: maintenance    MALNUTRITION:  % Weight Loss:  Weight loss does not meet criteria for malnutrition as it's more likely fluid related   % Intake:  Decreased intake does not meet criteria for malnutrition (only over the last 2-3 days)  Subcutaneous Fat Loss:  None observed  Muscle Loss:  None observed  Fluid Retention:  None noted    Malnutrition Diagnosis: Patient does not meet two of the above criteria necessary for diagnosing malnutrition      NUTRITION DIAGNOSIS:  Inadequate oral intake related to decreased appetite as evidenced by minimal intake since admit       NUTRITION INTERVENTIONS  Recommendations / Nutrition Prescription  Continue renal diet as tolerated  4oz milkshake at 2 pm     Implementation  Nutrition education: Not appropriate at this time due to patient condition  Medical Food Supplement:  Ordered as above     Nutrition Goals  Patient will consume 50% of meals TID and supplements BID     MONITORING AND EVALUATION:  Progress towards goals will be monitored and evaluated per protocol and Practice Guidelines    Lilian Warner RD, LD, CNSC   Clinical Dietitian - [facility]

## 2017-10-12 NOTE — PLAN OF CARE
Problem: Patient Care Overview  Goal: Plan of Care/Patient Progress Review  Outcome: No Change  7930-4336: Vss except slightly hypertensive. Tele SR/SB with 1*AVB. Patient A&O with no complaints this evening. IV fluid bolus continues. Up to bathroom with sba and cane. Restful evening for patient. Continue to monitor.

## 2017-10-13 NOTE — PLAN OF CARE
Problem: Patient Care Overview  Goal: Plan of Care/Patient Progress Review  PT: Orders received and chart reviewed. Eval completed. Patient admitted with chest pain and back pain, cardiac workup in progress. Prior to admit patient living in [facility], modified indep with mobility with SEC, using 4WW only to take garbage out, she reports completing her own self cares, meal prep, laundry, with son providing transportation to stores and appt.  Discharge Planner PT   Patient plan for discharge: TCU  Current status: Pt completed bed mobility with CGA, sit to stand w/ min A and SEC. Pt ambulated 125ft with SEC and min A, near LOB x 2 pt with propulsive pattern at times, overwalks her SEC, c/o SOB but VSS on RA. Pt limited by decreased LE strength, impaired gait and balance, decreased functional endurance.  Barriers to return to prior living situation: Need for assistance with mobility, falls risk  Recommendations for discharge: TCU vs Home with home PT; would benefit from OT evaluation for ADLs and cognition  Rationale for recommendations: Pt would benefit from skilled PT intervention to improve overall strength and endurance.       Entered by: Nida Sosa 10/12/2017 6:40 PM

## 2017-10-13 NOTE — PLAN OF CARE
Problem: Patient Care Overview  Goal: Plan of Care/Patient Progress Review  Outcome: Improving  Neuro/CMS: A&O x4, forgetful at times  CV: SR w/ 1st degree AVB w/ PACs  Resp/O2: WNL, on RA  GI/Diet: Renal diet, fair appetite  : Up to BR, u/o 175 cc  Skin/Incisions: WNL  Activity: Ax1 w/ cane  Lines: PIV SL  VS/Pain: BP 160s-170s/70, chest pain 7/10- Oxycodone and Tylenol given  D/C Plan: TBD

## 2017-10-13 NOTE — PROGRESS NOTES
10/12/17 1315   Quick Adds   Type of Visit Initial PT Evaluation   Living Environment   Lives With alone   Living Arrangements assisted living   Home Accessibility no concerns   Self-Care   Usual Activity Tolerance good   Current Activity Tolerance fair   Regular Exercise no   Equipment Currently Used at Home cane, straight   Activity/Exercise/Self-Care Comment has 4WW, uses when taking her garbage out at [facility]   Functional Level Prior   Ambulation 1-->assistive equipment   Transferring 1-->assistive equipment   Toileting 1-->assistive equipment   Bathing 1-->assistive equipment   Dressing 0-->independent   Eating 0-->independent   General Information   Onset of Illness/Injury or Date of Surgery - Date 10/11/17   Referring Physician Jeanine Ordaz MD   Patient/Family Goals Statement return home   Pertinent History of Current Problem (include personal factors and/or comorbidities that impact the POC) pt admitted with chest pain and back pain, cardiac workup in progress. past medical history notable for HTN, CKD stage 4, chronic anemia, and lymphedema    Precautions/Limitations fall precautions   Cognitive Status Examination   Orientation orientation to person, place and time   Level of Consciousness alert   Follows Commands and Answers Questions able to follow single-step instructions   Personal Safety and Judgment at risk behaviors demonstrated   Integumentary/Edema   Integumentary/Edema Comments B foot and ankle edema   Posture    Posture Forward head position;Protracted shoulders   Range of Motion (ROM)   ROM Comment LE ROM is WFL with hamstring and heel cord tightness   Strength   Strength Comments 4-/5 LE strength grossly, generalized deconditioning in presence of recent symptoms   Bed Mobility   Bed Mobility Comments CGA   Transfer Skills   Transfer Comments min A with SEC   Gait   Gait Comments min A with SEC, quick pace with path deviations, decreased step length   Balance   Balance Comments AD for  "mobility at baseline, SS with normal and narrowed BRITTANY, inc unsteadiness with dynamica activity    General Therapy Interventions   Planned Therapy Interventions balance training;gait training;strengthening;transfer training   Clinical Impression   Criteria for Skilled Therapeutic Intervention yes, treatment indicated   PT Diagnosis difficulty walking   Influenced by the following impairments decreased strength, impaired gait/balance, dec functional endurance   Functional limitations due to impairments impaired mobility   Clinical Presentation Evolving/Changing   Clinical Presentation Rationale clinical obseravation   Clinical Decision Making (Complexity) Moderate complexity   Therapy Frequency` daily   Predicted Duration of Therapy Intervention (days/wks) 3 days   Anticipated Discharge Disposition Transitional Care Facility  (vs home with home PT)   Risk & Benefits of therapy have been explained Yes   Patient, Family & other staff in agreement with plan of care Yes   [facility] [employer] TM \"6 Clicks\"   2016, Trustees of [facility], under license to [employer].  All rights reserved.   6 Clicks Short Forms Basic Mobility Inpatient Short Form   [facility] AM-PAC  \"6 Clicks\" V.2 Basic Mobility Inpatient Short Form   1. Turning from your back to your side while in a flat bed without using bedrails? 3 - A Little   2. Moving from lying on your back to sitting on the side of a flat bed without using bedrails? 3 - A Little   3. Moving to and from a bed to a chair (including a wheelchair)? 3 - A Little   4. Standing up from a chair using your arms (e.g., wheelchair, or bedside chair)? 3 - A Little   5. To walk in hospital room? 3 - A Little   6. Climbing 3-5 steps with a railing? 2 - A Lot   Basic Mobility Raw Score (Score out of 24.Lower scores equate to lower levels of function) 17     "

## 2017-10-13 NOTE — PROVIDER NOTIFICATION
"MD Notification    Notified Persons Name: Dr. Holt    Notification Date/Time: 10/13/17 0115    Notification Interaction:  Talked with Physician, updated on pt status     Purpose of Notification: Pt /82, pt refusing PRN blood pressure medication. Pt reports medication is making her nauseated, stating \"I am 89 years old, I am sick of taking all medications and having my blood pressure checked.\" Informed pt BP was very elevated but continued to refuse despite education. MD aware. Will continue to monitor.     "

## 2017-10-13 NOTE — PLAN OF CARE
Problem: Patient Care Overview  Goal: Plan of Care/Patient Progress Review  PT: Pt meeting with another care provider at this time however per RN appropriate to participate in therapy as long as remains nonsymptomatic, will attempt back as able once pt available.    ADDENDUM: Attempted back, pt very pleasant, alone in room but reporting son will be back shortly and having a friend come to visit tonight. Pt politely requests therapies do not attempt when she has visitors. Of note in chart, Per CC note pt to meet with home hospice and discharge home with sone and home hospice. Will attempt back as appropriate tomorrow based on pt identified needs and goals.

## 2017-10-13 NOTE — PROGRESS NOTES
Plan is for patient to discharge home with son and [facility].   Order placed for hospice consult.  SW to arrange for hospice to see Saturday morning to discuss plan and needs for home hospice.  Son will return to hospital at 10am on Saturday.

## 2017-10-13 NOTE — CONSULTS
[facility]    Palliative Care Consultation     Magy Askew  MRN# [id number]  Date of Admission:  10/10/2017  Date of Service (when I saw the patient): 10/13/17  Reason for consult: Consulted by Dr. Ordaz for Symptom management, Goals of care    Assessment & Plan   Magy Askew is a 89 year old female with PMH significant for HTN, CKD stage IV, macular degeneration, and lymphedema, who presents with chest and back pain. Her hospitalization has been complicated by accelerated HTN with hypertensive emergency, hyperkalemia, nonsustained v.tach, and progressive renal failure. She is declining HD. We are consulted for goals of care and symptom management.     Symptoms/Recommendations   -Transition to comfort measures only  -Pt would like to continue home norvasc, hydralazine, and metoprolol for now. These can be stopped at any point at home with hospice   - for hospice consultation, anticipate pt will return to [facility], likely tomorrow. Son will provide 24/7 care coverage at home   -Hydromorphone 1-2 mg every 2 hours as needed for pain, general discomfort or shortness of breath  -Lorazepam 0.5-1mg SL every 4 hours as needed for anxiety  -Haloperidol 1-2mg SL every 6 hours as needed for nausea, restlessness, agitation   -Atropine sulfate 1% drops give 1-2 drops SL every 1 hour as needed for end of life congestion/secretion  -Bisacodyl 10mg rectal daily PRN for constipation  -Senna 1-2 tablets BID PRN for constipation    Support/Coping  -Son Hao present and very supportive  -Another son who is estranged from Sanjana   -Pt has suffered great loss in life, she lost 2 daughters to heart disease and a granddaughter to a drunk driving accident (she was the victim)  -Pt is Rastafari, appreciate support from     Decisional Support, Goals of Care, Counseling & Coordination  Decisional Capacity Intact?  -Yes   Health Care Directive on File?  -POLST  "reviewed   Code Status/Resuscitation Preferences?  -DNR/DNI     Discussion  Visited son Hao in private. Introduced the scope of our practice to Hao. Discussed our potential roles for symptom management, support/coping, and decisional support (aka goals of care).     Together we reviewed Mery's health issues, including progressive renal failure and the impact on her cardiac function. Despite tweaks in her regimen, her renal function continues to decline and she is oliguric. It appears that dialysis would otherwise be looming in the near future, to which Hao responded that his mother would never want this for herself, as it would not add any quality of life. I share with Hao that I thus suspect that time to live could likely limited to days to weeks at this point. Hao was quite tearful during our discussion, sharing the difficult hardships he has experienced in life (including his estrangement from his brother, the loss of his father and 2 sisters to heart disease, and the loss of his daughter to a drunk driving accident (she was the victim)). Hao has good support from his joseph, Sikhism, wife, and friends. He is a very devoted son to Mery and thus it is very hard to think about her end of life.     We talk about the typical signs of imminent death, and what dying typically looks like with renal failure.     I educated Hao regarding hospice philosophy and prognostic criteria. Dispelled common myths. Discussed what hospice is (and is not), what services are usually provided (and those that are not, ex \"alf care\"), under what circumstances people tend to enroll, and the variety of places people can get hospice care (along with subsequent financial implications). Discussed typical anticipated timing of discharge. Hao would like to bring Mery home to her senior apartment and provide 24/7 care for her himself. We did discuss volunteer relief and the respite benefit.     We then visited Mery. I share " with Mery that her kidneys are shutting down, which is a sign that her body is aging. I share with her that I believe she is dying. She very much accepts this and relies on God to care for her in her final moments, and into the future. She finds much peace in this. She is very well supported by her son and is very appreciative to him for his love and support.     We talk about the plan for hospice at her apartment. We talk about symptom relief with medications, primarily opioids. Both Mery and Hao are comforted by this plan and agreeable to moving forward.     Plan of care reviewed with unit DENZEL Ewing.     Thank you for involving us in the care of this patient and family. We will continue to follow. Please do not hesitate to contact me with questions or concerns or the on-call provider for our team if evening or weekend.    Sheree GIPSON, [facility]  Palliative Medicine   Pager [phone]    Attestation:  Total time on the floor involved in the patient's care: 70 minutes  Total time spent in counseling/care coordination: >50%    Chief Complaint   Back and chest pain     History is obtained from the patient, staff, family and extensive chart review.     Past Medical History    I have reviewed this patient's medical history and updated it with pertinent information if needed.   Past Medical History:   Diagnosis Date     ACP (advance care planning) 11/26/2012    Discussed advance care planning with patient; information given to patient to review. 11/26/2012        BENIGN HYPERTENSION 3/2/2006     CARDIOVASCULAR SCREENING; LDL GOAL LESS THAN 130 11/5/2012     CKD (chronic kidney disease)        Past Surgical History   I have reviewed this patient's surgical history and updated it with pertinent information if needed.  History reviewed. No pertinent surgical history.    Social History   [facility]     Family system: 1 son Hao loving and supportive. Another son who is estranged. Pt had 2  daughters who  of heart disease. A granddaughter  as a victim of drunk driving     Self-identified support system: Hao     Employment/education: [place]    Activities/interests: She always enjoyed reading, but is no longer able to do this due to macular degeneration     Use of community resources: None     Bahai affiliation: Taoist, raised Sikhism but per son, had a falling out with the Caodaism when she was younger     Involvement in joseph community: [place]    Impact of illness on patient: Pt has ESRD and is at peace with dying     Family History   I have reviewed this patient's family history and updated it with pertinent information if needed.   Family History   Problem Relation Age of Onset     C.A.D. Father      C.A.D. Daughter        Allergies   Allergies   Allergen Reactions     Dust Mites      Influenza A, H1n1 Nausea and Vomiting     With hives and past out     Strawberry      Terazosin Hives       Medications   Current Facility-Administered Medications Ordered in Epic   Medication Dose Route Frequency Last Rate Last Dose     furosemide (LASIX) tablet 40 mg  40 mg Oral Daily   40 mg at 10/13/17 1002     amLODIPine (NORVASC) tablet 10 mg  10 mg Oral Daily   10 mg at 10/13/17 1002     hydrALAZINE (APRESOLINE) tablet 100 mg  100 mg Oral Q8H JEFF   100 mg at 10/13/17 0542     hydrALAZINE (APRESOLINE) injection 10 mg  10 mg Intravenous Q6H PRN         labetalol (NORMODYNE/TRANDATE) injection 10 mg  10 mg Intravenous Q2H PRN         lidocaine 1 % 1 mL  1 mL Other Q1H PRN         lidocaine (LMX4) cream   Topical Q1H PRN         sodium chloride (PF) 0.9% PF flush 3 mL  3 mL Intracatheter Q1H PRN         sodium chloride (PF) 0.9% PF flush 3 mL  3 mL Intracatheter Q8H   3 mL at 10/13/17 0542     acetaminophen (TYLENOL) tablet 650 mg  650 mg Oral Q4H PRN   650 mg at 10/12/17 1833     naloxone (NARCAN) injection 0.1-0.4 mg  0.1-0.4 mg Intravenous Q2 Min PRN         melatonin tablet 1 mg  1 mg Oral At Bedtime PRN          senna-docusate (SENOKOT-S;PERICOLACE) 8.6-50 MG per tablet 1-2 tablet  1-2 tablet Oral BID PRN         polyethylene glycol (MIRALAX/GLYCOLAX) Packet 17 g  17 g Oral Daily PRN   17 g at 10/13/17 1002     bisacodyl (DULCOLAX) Suppository 10 mg  10 mg Rectal Daily PRN         ondansetron (ZOFRAN-ODT) ODT tab 4 mg  4 mg Oral Q6H PRN   4 mg at 10/11/17 1717    Or     ondansetron (ZOFRAN) injection 4 mg  4 mg Intravenous Q6H PRN         acetaminophen (TYLENOL) Suppository 650 mg  650 mg Rectal Q4H PRN         oxyCODONE (ROXICODONE) IR half-tab 2.5 mg  2.5 mg Oral Q3H PRN   2.5 mg at 10/13/17 1002     prochlorperazine (COMPAZINE) injection 5 mg  5 mg Intravenous Q6H PRN   5 mg at 10/11/17 0059    Or     prochlorperazine (COMPAZINE) tablet 5 mg  5 mg Oral Q6H PRN        Or     prochlorperazine (COMPAZINE) Suppository 12.5 mg  12.5 mg Rectal Q12H PRN         metoprolol (LOPRESSOR) tablet 25 mg  25 mg Oral BID   25 mg at 10/13/17 1002     albuterol neb solution 2.5 mg  2.5 mg Nebulization Q6H PRN         No current [facility]-ordered outpatient prescriptions on file.       Review of Systems   The comprehensive review of systems is negative other than noted here and in the assessment/plan.    Palliative Symptom Review (0=no symptom/no concern, 1=mild, 2=moderate, 3=severe):  Pain: 0  Shortness of Breath: 0    Physical Exam   Temp: 98.3  F (36.8  C) Temp src: Oral BP: 172/72 Pulse: 59 Heart Rate: 58 Resp: 18 SpO2: 98 % O2 Device: None (Room air)    Vitals:    10/11/17 0533 10/12/17 0619 10/13/17 0609   Weight: 52.1 kg (114 lb 13.8 oz) 54.4 kg (119 lb 14.9 oz) 53 kg (116 lb 13.5 oz)     CONSTITUTIONAL: Chronically ill elderly woman seen lying in bed in NAD, A&Ox3. Calm and cooperative. Son present   HEENT: NCAT  RESPIRATORY: NL respiratory effort on RA  NEUROLOGIC: Appropriately responsive during interview  PSYCH: Affect congruent     Data   Results for orders placed or performed during the hospital encounter of 10/10/17 (from  the past 24 hour(s))   Basic metabolic panel   Result Value Ref Range    Sodium 136 133 - 144 mmol/L    Potassium 5.2 3.4 - 5.3 mmol/L    Chloride 105 94 - 109 mmol/L    Carbon Dioxide 21 20 - 32 mmol/L    Anion Gap 10 3 - 14 mmol/L    Glucose 93 70 - 99 mg/dL    Urea Nitrogen 53 (H) 7 - 30 mg/dL    Creatinine 5.12 (H) 0.52 - 1.04 mg/dL    GFR Estimate 8 (L) >60 mL/min/1.7m2    GFR Estimate If Black 9 (L) >60 mL/min/1.7m2    Calcium 8.6 8.5 - 10.1 mg/dL

## 2017-10-13 NOTE — PROGRESS NOTES
CM    I:  KHOA consult for hospice consult completed.  KHOA contacted Luci, [facility] who stated they can have a Hospice rep meet with family in patient's room tomorrow, 10/14, at 1p.m (only available time).  Son, Hao, was updated and agrees with plan.  Anticipated discharge date is: 10/14, pending physician approval.  [facility].  Per RN CC, son has confirmed he and his wife will be able to provide 24/7 assistance to patient in her home.     P:  Continue to assist with discharge planning as needed.    RAMIRO Goldberg

## 2017-10-13 NOTE — DISCHARGE SUMMARY
[facility]  Discharge Summary        Magy Askew MRN# [id number]   YOB: 1923 Age: 89 year old     Date of Admission:  10/10/2017  Date of Discharge:  10/14/2017  Admitting Physician:  Anthony Pascal MD  Discharge Physician: Tyler Valdivia MD  Discharging Service: Hospitalist     Primary Provider: Jeff Beckford  Primary Care Physician Phone Number: [phone]     Discharge Diagnoses:     Accelerated hypertension/hypertensive emergency  ESRD (MATTIE on CKD versus progression of underlying stage IV CKD)  Hyperkalemia  Back pain  Atypical chest pain, likely musculoskeletal.  Non-sustained ventricular tachycardia  Chronic anemia    Problem Oriented Hospital Course   Ms. Magy Askew is a delightful 89 year old lady with a past medical history notable for HTN, CKD stage 4, chronic anemia, and lymphedema who presented with chest and back pain.       Accelerated hypertension with hypertensive emergency    /102 upon admission. Suspected patient chronically runs high blood pressure.  Prior to admission BP regimen included amlodipine 5 mg daily, hydralazine 50 mg 3 times daily, metoprolol 75 mg twice daily, and Lasix 20 mg daily. Her antihypertensive regimen was adjusted as below:  Hydralazine was increased to100 mg 3 times daily. Amlodipine was increased to 10 mg daily. She continued on PTA metoprolol at lower dose of 25 mg twice daily and furosemide 20 mg po daily.     ESRD: MATTIE on chronic renal failure versus progression of CKD stage 4.  Admission creatinine was 4.84 with evidence of fluid overload on exam. Baseline creatinine previously in the range of 2.7 to 3.3. FENa 1.1%. Uric acid 7.8. Urinalysis showed clear urine, neg nitirite, neg leukocyte esterase, 4 WBCs, and few bacteria. Renal ultrasound 10/11 unremarkable with no evidence for obstruction. Patient had poor urine output.  Upon admission she was treated with gentle hydration. Then she was started on PTA  furosemide per nephrology recommendation. Renal function was closely monitored and there was no improvement. Patient did not wish to pursue dialysis, which was deemed appropriate given her age. Palliative care service was consulted to address the goal of care. Given overall poor prognosis and lack of improvement in renal function, the patient and son made a decision to pursue hospice care to focus on pain management and comfort.  Patient decided to continue with blood pressure medications for now. [facility] will meet with the family on 10/14 at 1 pm before discharge.       Recent Labs  Lab 10/13/17  0551 10/12/17  0549 10/11/17  0550 10/10/17  1755   CR 5.12* 4.96* 4.91* 4.84*         Hyperkalemia:  It was due to advanced renal failure. Potassium was 4.8 on admission, then increased to 5.4 on 10/11. At the time of discharge it was stable at 5.2.     Back pain.    Located in her mid back and was sharp.  It seemed somewhat positional and hurt when paraspinal muscles were pressed.   ED was considering aortic dissection and did a CT scan of the chest without contrast in the setting of renal failure which did not show any large dissection.      Atypical chest pain.    She reported chest pain in her left lower chest which was pressure like and intermittent. It was not exertional. There was tenderness to palpation suggestive of a musculoskeletal pain. CT chest without contrast 10/11 showed bilateral pleural effusions, moderate on the right and small on the Left, cardiomegaly, coronary artery calcifications, and mld ectasia of ascending aorta.  EKG in the emergency room did show new T-wave inversions in 1 and aVL which were nonspecific.   NT-proBNP was elevated at 34,853, not diagnostic of heart failure in someone with a creatinine of 4.9. Previous echo in January was negative other than some LVH and moderate pulmonary hypertension. Serial troponin I levels remained normal as below.       Recent Labs  Lab  10/12/17  0549 10/11/17  0225 10/10/17  2220 10/10/17  1755   TROPI 0.029 0.022 0.018 0.024     Non-sustained ventricular tachycardia:  Patient developed 32 beat V tach on 10/11. Magnesium was normal at 2.2. Cardiology was consulted and believed it occurred in the setting of renal failure and hyperkalemia. Previous echo in 01/2017 had shown normal LV systolic function with EF of 65-70%, mild-moderate LV hypertrophy, and moderate pulmonary hypertension. She continued on PTA metoprolol at lower dose of 25 mg po bid. Up titration was not feasible due to bradycardia. No cardiac work-up was recommended per cardiology.     Chronic anemia.    Baseline Hgb was in the range of 9-10. Hgb was 9.9 upon admission, which dropped to 8.8 after fluid therapy. There were no signs or symptoms of bleed.            Code Status:      DNR / DNI        Important Results:      Na 136, K 5.2, Cr 5.12, Ca 8.6, Hgb 8.6.        Pending Results:        Unresulted Labs Ordered in the Past 30 Days of this Admission     No orders found from 8/11/2017 to 10/11/2017.              Discharge Instructions and Follow-Up:      Follow-up Appointments     Follow-up and recommended labs and tests        [facility] to provide care and support after discharge                        Discharge Disposition:      Discharged to home        Discharge Medications:        Current Discharge Medication List      START taking these medications    Details   MEDICATION INSTRUCTION If care facility cannot accept or use ranges, facility is instructed to use lower end of dosing range    Associated Diagnoses: End stage renal disease (H)      HYDROmorphone, STANDARD CONC, (DILAUDID) 1 MG/ML LIQD liquid Take 1-2 mLs (1-2 mg) by mouth or place under tongue every 2 hours as needed for moderate to severe pain (and/or  shortness of breath)  Qty: 120 mL, Refills: 0    Associated Diagnoses: End stage renal disease (H)      LORazepam (ATIVAN) 2 MG/ML (HIGH CONC) solution Take  0.25 mLs (0.5 mg) by mouth, place under tongue or insert rectally every 4 hours as needed for anxiety (restlessness)  Qty: 30 mL, Refills: 1    Associated Diagnoses: End stage renal disease (H)      haloperidol (HALDOL) 2 MG/ML (HIGH CONC) solution Take 0.5-1 mLs (1-2 mg) by mouth, place under tongue or insert rectally every 6 hours as needed for agitation (nausea)  Qty: 30 mL, Refills: 1    Associated Diagnoses: End stage renal disease (H)      atropine 1 % ophthalmic solution Take 2-4 drops by mouth, place under tongue or place inside cheek every 2 hours as needed for other (terminal respiratory secretions) Not for ophthalmic use.  Qty: 5 mL, Refills: 1    Associated Diagnoses: End stage renal disease (H)      bisacodyl (DULCOLAX) 10 MG Suppository Unwrap and insert 1 suppository rectally twice daily as needed for constipation.  Qty: 12 suppository, Refills: 1    Associated Diagnoses: End stage renal disease (H)      sennosides (SENOKOT) 8.6 MG tablet Take 1-2 tablets by mouth 2 times daily  Qty: 100 tablet, Refills: 1    Associated Diagnoses: End stage renal disease (H)         CONTINUE these medications which have CHANGED    Details   hydrALAZINE (APRESOLINE) 50 MG tablet Take 2 tablets (100 mg) by mouth 3 times daily  Qty: 270 tablet, Refills: 3    Associated Diagnoses: Essential hypertension, benign      metoprolol (LOPRESSOR) 25 MG tablet Take 1 tablet (25 mg) by mouth 2 times daily  Qty: 540 tablet, Refills: 0    Associated Diagnoses: Essential hypertension, benign      amLODIPine (NORVASC) 5 MG tablet Take 2 tablets (10 mg) by mouth daily  Qty: 90 tablet, Refills: 0    Associated Diagnoses: Essential hypertension, benign         CONTINUE these medications which have NOT CHANGED    Details   furosemide (LASIX) 20 MG tablet TAKE 1 TABLET(20 MG) BY MOUTH DAILY  Qty: 90 tablet, Refills: 5    Comments: **Patient requests 90 days supply**  Associated Diagnoses: Pulmonary hypertension      albuterol (PROAIR  "HFA/PROVENTIL HFA/VENTOLIN HFA) 108 (90 BASE) MCG/ACT Inhaler Inhale 2 puffs into the lungs every 6 hours as needed for shortness of breath / dyspnea or wheezing  Qty: 3 Inhaler, Refills: 1    Associated Diagnoses: Shortness of breath      order for DME Equipment being ordered: ABBY hose,  1pair  Qty: 1 Device, Refills: 0    Associated Diagnoses: Cellulitis of right leg                 Allergies:         Allergies   Allergen Reactions     Dust Mites      Influenza A, H1n1 Nausea and Vomiting     With hives and past out     Strawberry      Terazosin Hives           Consultations This Hospital Stay:      Cardiology  Nephrology  Palliative Care  [facility]        Condition and Physical on Discharge:      Discharge condition: Stable   Vitals: Blood pressure 161/73, pulse 59, temperature 97.8  F (36.6  C), temperature source Oral, resp. rate 16, height 1.499 m (4' 11\"), weight 53 kg (116 lb 13.5 oz), SpO2 95 %, not currently breastfeeding.           Diet and Activity:     After Care Instructions     Activity       Your activity upon discharge: activity as tolerated            Diet       Follow this diet upon discharge: Renal                            Discharge Time:      Greater than 30 minutes.        Image Results From This Hospital Stay (For Non-EPIC Providers):        Results for orders placed or performed during the hospital encounter of 10/10/17   CT Chest w/o Contrast    Narrative    CT CHEST WITHOUT CONTRAST 10/10/2017 7:12 PM     HISTORY: Hypertension, chest/back pain    TECHNIQUE: Volumetric acquisition of the chest  without contrast.  Radiation dose for this scan was reduced using automated exposure  control, adjustment of the mA and/or kV according to patient size, or  iterative reconstruction technique.    COMPARISON: Chest x-ray 2/8/2017.    FINDINGS: Calcified thoracic aorta with slight ectasia of the  ascending aorta measuring 3.7 cm. Cardiomegaly. Coronary artery  calcifications. No enlarged " mediastinal or hilar lymph nodes.  Bilateral pleural effusions, moderate on the right and small on the  left. Mild associated atelectasis. No other focal airspace disease. No  pneumothorax. Bone windows demonstrate minimal degenerative changes.  No destructive lesions. Mild indistinct adrenal gland thickening.      Impression    IMPRESSION:  1. Bilateral pleural effusions, moderate on the right and small on the  left. Findings may be due to congestive heart failure. Correlate  clinically.  2. Cardiomegaly and coronary artery calcifications.  3. Mild ectasia of ascending aorta.    CARLOS LEONARD MD   US Renal Complete    Narrative    ULTRASOUND RETROPERITONEAL COMPLETE 10/11/2017 2:41 PM     HISTORY: CKD (chronic kidney disease), back pain. Rule out  obstruction.    COMPARISON: None.    FINDINGS: The bilateral renal parenchyma are increased in echogenicity  without evidence for shadowing stone or mass. No renal cysts. No  hydronephrosis. Right kidney measures 8.2 x 3.0 x 4.0 cm and the left  measures 6.8 x 2.9 x 3.1 cm. Cortical thickness is 0.8 cm on the right  and 0.7 cm on the left. Bladder is not well seen.      Impression    IMPRESSION: No obstruction demonstrated.    EVELYNE BALLARD MD     No results found for this or any previous visit (from the past 4320 hour(s)).        Most Recent Lab Results In EPIC (For Non-EPIC Providers):    Most Recent 3 CBC's:  Recent Labs   Lab Test  10/12/17   0549  10/11/17   0550  10/10/17   1755  04/30/17   1833   WBC   --   7.0  6.8  14.4*   HGB  8.6*  8.8*  9.9*  11.6*   MCV   --   89  89  89   PLT   --   224  254  270      Most Recent 3 BMP's:  Recent Labs   Lab Test  10/13/17   0551  10/12/17   0549  10/11/17   0550   NA  136  136  137   POTASSIUM  5.2  5.1  5.4*   CHLORIDE  105  106  105   CO2  21  23  23   BUN  53*  54*  57*   CR  5.12*  4.96*  4.91*   ANIONGAP  10  7  9   RIP  8.6  8.0*  8.5   GLC  93  95  113*     Most Recent 3 Troponin's:  Recent Labs   Lab Test   10/12/17   0549  10/11/17   0225  10/10/17   2220   TROPI  0.029  0.022  0.018     Most Recent 3 INR's:  Recent Labs   Lab Test  10/28/11   1015   INR  1.02     Most Recent 2 LFT's:  Recent Labs   Lab Test  11/30/16   0918  03/09/16   1040   AST  18  17   ALT  16  26   ALKPHOS  121  83   BILITOTAL  0.6  0.5     Most Recent Cholesterol Panel:  Recent Labs   Lab Test  07/22/13   0840   CHOL  184   LDL  97   HDL  58   TRIG  142     Most Recent 6 Bacteria Isolates From Any Culture (See EPIC Reports for Culture Details):  Recent Labs   Lab Test  10/28/11   1725  10/28/11   1415   CULT  No growth  No growth after 6 days     Most Recent TSH, T4 and HgbA1c: No lab results found.

## 2017-10-13 NOTE — PROGRESS NOTES
Addendum: Mery has a Hospice Consult scheduled tomorrow in her pt room at [facility]. SW alerted PCP.  Soraida Gan [facility]  Clinic Care Coordinator-  Clinics: [place], [place], [place]  E-Mail: [email or web address]  Telephone: [phone]

## 2017-10-13 NOTE — PLAN OF CARE
"Problem: Patient Care Overview  Goal: Plan of Care/Patient Progress Review  Outcome: No Change  HTN, 's overnight, pt declined PRN BP meds d/t side effects. Stating the make her feel \"woozy,\" also reports her arms are getting sore from taking blood pressures so frequently. Took a lot of encouragement for pt to allow me to assess BP this AM, BP remains elevated this AM. /95, pt willing to take AM hydralazine dose. Denies pain and SOB, oxygen stable on RA. Tele SR with 1st degree AVB. Plan to continue to control HTN. Will continue to monitor.       "

## 2017-10-13 NOTE — PROGRESS NOTES
[facility]  Hospitalist Progress Note  Jeanine Ordaz MD    Assessment & Plan   Ms. Magy Askew is a 89 year old lady with a past medical history notable for HTN, CKD stage 4, chronic anemia, and lymphedema who presented with chest and back pain.      Accelerated hypertension with hypertensive emergency    /102 upon admission. Suspect patient chronically runs high blood pressure.  Prior to admission BP regimen includes amlodipine 5 mg daily, hydralazine 50 mg 3 times daily, metoprolol 75 mg twice daily, and Lasix 20 mg daily.  BP is currently uncontrolled. Per RN she refused her BP meds last night due to nausea.    -Cont PTA hydralazine at higher dose of 100 mg 3 times daily  -Cont PTA amlodipine at higher dose of 10 mg daily  -Continue PTA metoprolol at 25 mg twice daily  -Cont furosemide at higher dose of 40 mg po daily  -PRN IV hydralazine and labetalol available for SBP>180  -Cont to monitor BP    MATTIE on chronic renal failure versus progression of CKD stage 4.  Admission creatinine is 4.84 with evidence of fluid overload on exam. Baseline creatinine previously in the range of 2.7 to 3.3.   FENa 1.1%. Uric acid 7.8. Urinalysis showed clear urine, neg nitirite, neg leukocyte esterase, 4 WBCs, and few bacteria.  Renal ultrasound 10/11 unremarkable with no evidence for obstruction.  Patient has poor urine output. Weight is up by 4 lbs.      Recent Labs  Lab 10/13/17  0551 10/12/17  0549 10/11/17  0550 10/10/17  1755   CR 5.12* 4.96* 4.91* 4.84*     -Received gentle hydration with total 1500 cc IV NS upon admission  -Started on PTA furosemide on 10/12 at higher dose of 40 mg daily  -Cont to monitor renal function  -Avoid NSAIDs and nephrotoxins  -Cont  renal diet  -Patient is not interested on dialysis  -Nephrology consulted and appreciate assist with management.  -Given overall poor prognosis and lack of improvement in renal function, will ask palliative care service to see the  patient to discuss the goal of care and consider transition to hospice. This was discussed with her son.     Hyperkalemia:  K 4.8 on admission, then increased to 5.4 10/11. Today 5.2.    -Cont to monitor   -Avoid ARB/ACEI    Back pain.    Located in her mid back and is sharp.  It seems somewhat positional and hurts when paraspinal muscles are pressed.   ED was considering aortic dissection and did a CT scan of the chest without contrast in the setting of renal failure which did not show any large dissection.    -Cont with heating pad, Tylenol as needed, and low-dose oxycodone 2.5 mg every 4 hours as needed   -Cont PT/OT    Atypical chest pain.    She has chest pain in her left lower chest which is pressure like and intermittent. It is not exertional.   There is tenderness to palpation suggestive of a musculoskeletal pain. Chest pain could be due to accelerated hypertension.  CT chest without contrast 10/11 showed bilateral pleural effusions, moderate on the right and small on the Left, cardiomegaly, coronary artery calcifications, and mld ectasia of ascending aorta.  EKG in the emergency room did show new T-wave inversions in 1 and aVL which were nonspecific.   NT-proBNP is elevated at 34,853, not diagnostic of heart failure in someone with a creatinine of 4.9.   Previous echo in January was negative other than some LVH and moderate pulmonary hypertension.   Serial troponin I levels normal as below.    Recent Labs  Lab 10/12/17  0549 10/11/17  0225 10/10/17  2220 10/10/17  1755   TROPI 0.029 0.022 0.018 0.024     -Cont supportive care and prn analgesics    Non-sustained ventricular tachycardia:  Patient developed 32 beat V tach on 10/11. No recurrence.  Magnesium 2.2.  Cardiology consulted and believed it occurred in the setting of renal failure and hyperkalemia. Previous echo in 01/2017 had shown normal LV systolic function with EF of 65-70%, mild-moderate LV hypertrophy, and moderate pulmonary  hypertension.    -Cont on metoprolol 25 mg po bid. Up titrtae if HR allows.  -No cardiac work-up per cardiology  -Cont telemetry    Chronic anemia.    Baseline Hgb 9-10. Hgb 9.9 upon admission, which dropped to 8.8 after fluid therapy. No signs or symptoms of bleed.    Recent Labs  Lab 10/12/17  0549 10/11/17  0550 10/10/17  1755   HGB 8.6* 8.8* 9.9*     -Cont to monitor Hgb intermittently    Poor appetite:  Nutrition Service consulted and patient was started on supplement.     DVT Prophylaxis: Pneumatic Compression Devices  Code Status: DNR/DNI (It is based on the POLST available from 02/2017. This was confirmed with the patient).     Disposition: Pending ongoing management, hospital course, and palliative service consult. If patient and family agree on comfort and hospice care, and pain is well controlled, expect discharge in 1-2 days. Ok for transfer to medical floor.    ADDENDUM:  Patient and son met with palliative care and decided to pursue hospice. Plan for discharge to home tomorrow after meeting with  Hospice at 1 pm.    D/W RN and palliative care service.  D/W son at [facility] on 10/13.    Time Spent on this Encounter   I spent 35 minutes on the unit/floor managing the care of Magy Askew. Over 50% of my time was spent counseling the patient and/or coordinating care regarding services listed in this note.    Interval History   Patient continues to have chest and back pain. She refused taking her BP meds last night. Urine output continues to stay poor. No recurrence of V tach.    Data reviewed today: I reviewed all new labs and imaging over the last 24 hours. I personally reviewed the telemetry monitor showing sinus rhythm    Physical Exam   Temp: 98.3  F (36.8  C) Temp src: Oral BP: 172/72 Pulse: 59 Heart Rate: 58 Resp: 18 SpO2: 98 % O2 Device: None (Room air)    Vitals:    10/11/17 0533 10/12/17 0619 10/13/17 0609   Weight: 52.1 kg (114 lb 13.8 oz) 54.4 kg (119 lb 14.9 oz) 53 kg (116 lb 13.5 oz)      Vital Signs with Ranges  Temp:  [97.4  F (36.3  C)-98.3  F (36.8  C)] 98.3  F (36.8  C)  Pulse:  [59] 59  Heart Rate:  [56-90] 58  Resp:  [15-20] 18  BP: (160-202)/(69-95) 172/72  SpO2:  [91 %-98 %] 98 %  I/O's Last 24 hours  I/O last 3 completed shifts:  In: 660 [P.O.:660]  Out: 225 [Urine:225]    Constitutional: Comfortable, no acute distress  HEENT: +conjunctival pallor.  Neurologic: Awake, alert, and appears fully oriented  Neck: Supple, no elevated JVP  Respiratory: Clear to auscultation  Cardiovascular: Normal S1 and S2. Regular rhythm and rate  Chest wall: Tender on the left side to palpation  GI: Abdomen soft, not tender, not distended  Extremities: No calf tenderness, 1+ hand and LE edema  Skin/integument: No acute rash, no cyanosis    Medications   All medications were reviewed.       furosemide  40 mg Oral Daily     amLODIPine  10 mg Oral Daily     hydrALAZINE  100 mg Oral Q8H JEFF     sodium chloride (PF)  3 mL Intracatheter Q8H     metoprolol  25 mg Oral BID        Data     Recent Labs  Lab 10/13/17  0551 10/12/17  0549 10/11/17  0550 10/11/17  0225 10/10/17  2220 10/10/17  1755   WBC  --   --  7.0  --   --  6.8   HGB  --  8.6* 8.8*  --   --  9.9*   MCV  --   --  89  --   --  89   PLT  --   --  224  --   --  254    136 137  --   --  136   POTASSIUM 5.2 5.1 5.4*  --   --  4.8   CHLORIDE 105 106 105  --   --  103   CO2 21 23 23  --   --  24   BUN 53* 54* 57*  --   --  54*   CR 5.12* 4.96* 4.91*  --   --  4.84*   ANIONGAP 10 7 9  --   --  9   RIP 8.6 8.0* 8.5  --   --  8.5   GLC 93 95 113*  --   --  114*   TROPI  --  0.029  --  0.022 0.018 0.024       No results found for this or any previous visit (from the past 24 hour(s)).

## 2017-10-14 NOTE — PLAN OF CARE
Problem: Patient Care Overview  Goal: Plan of Care/Patient Progress Review  Physical Therapy Discharge Summary     Reason for therapy discharge:    Discharged to home.     Progress towards therapy goal(s). See goals on Care Plan in [facility] electronic health record for goal details.  Goals not met.  Barriers to achieving goals:   discharge from facility.     Therapy recommendation(s):    No further therapy is recommended.  Pt discharged home with hospice cares and family assist. No further needs.

## 2017-10-14 NOTE — PROGRESS NOTES
"[facility]    Internal Medicine Hospitalist Progress Note  10/14/2017  I evaluated patient on the above date.    Tyler Valdivia Jr., MD  [phone] (p)  Text Page (7 am to 6 pm)      Assessment & Plan   See d/c summary.    Interval History   Stable. I d/w son at bedside.    -Data reviewed today: I reviewed all new labs and imaging over the last 24 hours. I personally reviewed no images or EKG's today.    Physical Exam   Heart Rate: 62, Blood pressure 164/76, pulse 60, temperature 97.8  F (36.6  C), temperature source Oral, resp. rate 16, height 1.499 m (4' 11\"), weight 53 kg (116 lb 13.5 oz), SpO2 96 %, not currently breastfeeding.  Vitals:    10/11/17 0533 10/12/17 0619 10/13/17 0609   Weight: 52.1 kg (114 lb 13.8 oz) 54.4 kg (119 lb 14.9 oz) 53 kg (116 lb 13.5 oz)     Vital Signs with Ranges  Temp:  [97.8  F (36.6  C)] 97.8  F (36.6  C)  Pulse:  [59-60] 60  Heart Rate:  [59-62] 62  Resp:  [16] 16  BP: (161-164)/(73-76) 164/76  SpO2:  [95 %-96 %] 96 %  Patient Vitals for the past 24 hrs:   BP Temp Temp src Pulse Heart Rate Resp SpO2   10/13/17 2134 - - - 60 - - -   10/13/17 1539 164/76 97.8  F (36.6  C) Oral - 62 16 96 %   10/13/17 1459 161/73 - - 59 59 - 95 %     I/O's Last 24 hours  I/O last 3 completed shifts:  In: 400 [P.O.:400]  Out: 200 [Urine:200]    Constitutional: Alert, oriented, pleasant.  Respiratory:   Cardiovascular:   GI:   Skin/Integumen:   Other:        Data     Recent Labs  Lab 10/13/17  0551 10/12/17  0549 10/11/17  0550 10/11/17  0225 10/10/17  2220 10/10/17  1755   WBC  --   --  7.0  --   --  6.8   HGB  --  8.6* 8.8*  --   --  9.9*   MCV  --   --  89  --   --  89   PLT  --   --  224  --   --  254    136 137  --   --  136   POTASSIUM 5.2 5.1 5.4*  --   --  4.8   CHLORIDE 105 106 105  --   --  103   CO2 21 23 23  --   --  24   BUN 53* 54* 57*  --   --  54*   CR 5.12* 4.96* 4.91*  --   --  4.84*   ANIONGAP 10 7 9  --   --  9   RIP 8.6 8.0* 8.5  --   --  8.5   GLC 93 95 113*  " --   --  114*   TROPI  --  0.029  --  0.022 0.018 0.024     Recent Labs   Lab Test  10/13/17   0551  10/12/17   0549  10/11/17   0550  10/10/17   1755  04/30/17   1833   01/10/17   0146   GLC  93  95  113*  114*  176*   < >   --    BGM   --    --    --    --    --    --   100*    < > = values in this interval not displayed.         No results found for this or any previous visit (from the past 24 hour(s)).    Medications   All medications were reviewed.       furosemide  40 mg Oral Daily     amLODIPine  10 mg Oral Daily     hydrALAZINE  100 mg Oral Q8H JEFF     sodium chloride (PF)  3 mL Intracatheter Q8H     metoprolol  25 mg Oral BID

## 2017-10-14 NOTE — PLAN OF CARE
Problem: Patient Care Overview  Goal: Plan of Care/Patient Progress Review  Outcome: Improving  AOx4, comfort cares. Pt pleasant, calm and controlled. Refusing medications r/t not wanting to experience unpleasant side effects. Pt appetite poor, family at bedside encouraging her to eat. Pt meeting with hospice nurse, anticipate d/c to home with hospice and son/daughter in law providing care 24/7. Awaiting discharge medications.

## 2017-10-14 NOTE — PLAN OF CARE
Problem: Patient Care Overview  Goal: Plan of Care/Patient Progress Review  Outcome: No Change  Pt is on comfort cares; call light appropriate. Up w/assist of 1 and cane. Denies pain overnight. Pleasant. Palliative seen patient 10/13 and plan is to d/c home w/hospice later today. Refused AM hydralazine. Family is present visiting at the bedside.

## 2017-10-14 NOTE — PLAN OF CARE
Problem: Patient Care Overview  Goal: Plan of Care/Patient Progress Review  Outcome: No Change  A&Ox4. A1 with a cane. Comfort Care. Denies pain. Showered. Saline locked. Hospice meeting tomorrow with pt and family, possibly DC to home after meeting.

## 2017-10-14 NOTE — CONSULTS
RN met with client and son Hao to discuss hospice philosophy and goals of care. Client will d/c on 10/14 to [facility] with son as primary caregiver, admission team will meet family at 1pm on 10/15 to complete admission. Client declined any equipment needs at this time. Dr Valdivia ordered comfort kit and this will be sent with client. Services elected RN, SW, HHA 3x/wk, and music therapy. POLST completed and signed, copy sent with family.   Coordinated cares with Dr Valdivia and [facility] LAWS.  Thank you for this referral  Please call [facility] with any questions    Kimberly Dykes RN

## 2017-10-16 NOTE — PROGRESS NOTES
Clinic Care Coordination Contact  OUTREACH    Referral Information:     Reason for Contact: Hospital Follow up. Mery has enrolled herself in Hospice Care. She has decided to discontinue all BP medicines; will continue taking lasix.   Hospice is following. Hao and his wife are assisting. Mery is home in her apartment at [facility].        Plan: CC [facility] will continue to provide emotional support for care giving son. Hao asked that I thank Dr Lara for all of his good care for mom.  CC SW updated Goal to Comfort Care at home with family assist.    Soraida Gan [facility]  Clinic Care Coordinator-  Clinics: [place], [place], [place]  E-Mail: [email or web address]  Telephone: [phone]

## 2017-10-23 ENCOUNTER — TELEPHONE (OUTPATIENT)
Dept: INTERNAL MEDICINE | Facility: CLINIC | Age: 82
End: 2017-10-23

## 2017-10-23 NOTE — TELEPHONE ENCOUNTER
[facility] nurse called to let pcp know pt passed away 10/21/2017 at 2:30 am. Sympathy card placed in pcp folder for signature.

## 2017-10-28 ENCOUNTER — CARE COORDINATION (OUTPATIENT)
Dept: CARE COORDINATION | Facility: CLINIC | Age: 82
End: 2017-10-28

## 2017-10-28 NOTE — PROGRESS NOTES
CC [facility] note:  Pt passed away 10/21/17 2:30 in the morning.  Final out reach made to her son Hao.  Says mom will be buried at [place];  Says she was looking forward to going to [facility] so she could meet her mother who passed away when she was 9 months old. Hao said Mery was preceded in death by his 2 sisters who were in their 50's ( from cardiac issues.)  Hao appreciated this final out reach.    Will let PCP know chart needs to say .    Soraida Gan \[facility] [place] [facility]\""  Clinic Care Coordinator-  Clinics: [place], [place], [place]  E-Mail: [email or web address]  Telephone: [phone]

## 2017-11-15 ENCOUNTER — MEDICAL CORRESPONDENCE (OUTPATIENT)
Dept: HEALTH INFORMATION MANAGEMENT | Facility: CLINIC | Age: 82
End: 2017-11-15
